# Patient Record
Sex: FEMALE | Race: WHITE | NOT HISPANIC OR LATINO | Employment: PART TIME | URBAN - METROPOLITAN AREA
[De-identification: names, ages, dates, MRNs, and addresses within clinical notes are randomized per-mention and may not be internally consistent; named-entity substitution may affect disease eponyms.]

---

## 2017-05-17 ENCOUNTER — ALLSCRIPTS OFFICE VISIT (OUTPATIENT)
Dept: OTHER | Facility: OTHER | Age: 67
End: 2017-05-17

## 2017-05-17 DIAGNOSIS — Z13.83 ENCOUNTER FOR SCREENING FOR RESPIRATORY DISORDER: ICD-10-CM

## 2017-05-17 DIAGNOSIS — R93.89 ABNORMAL FINDINGS ON DIAGNOSTIC IMAGING OF OTHER SPECIFIED BODY STRUCTURES: ICD-10-CM

## 2017-05-17 DIAGNOSIS — Z13.89 ENCOUNTER FOR SCREENING FOR OTHER DISORDER: ICD-10-CM

## 2017-05-17 DIAGNOSIS — Z13.6 ENCOUNTER FOR SCREENING FOR CARDIOVASCULAR DISORDERS: ICD-10-CM

## 2017-05-17 DIAGNOSIS — E03.9 HYPOTHYROIDISM: ICD-10-CM

## 2017-07-23 ENCOUNTER — HOSPITAL ENCOUNTER (OUTPATIENT)
Dept: CT IMAGING | Facility: HOSPITAL | Age: 67
Discharge: HOME | End: 2017-07-23
Payer: MEDICARE

## 2017-07-23 DIAGNOSIS — R93.89 ABNORMAL FINDINGS ON DIAGNOSTIC IMAGING OF OTHER SPECIFIED BODY STRUCTURES: ICD-10-CM

## 2017-07-23 PROCEDURE — 71250 CT THORAX DX C-: CPT

## 2017-07-26 ENCOUNTER — GENERIC CONVERSION - ENCOUNTER (OUTPATIENT)
Dept: OTHER | Facility: OTHER | Age: 67
End: 2017-07-26

## 2017-08-24 ENCOUNTER — ALLSCRIPTS OFFICE VISIT (OUTPATIENT)
Dept: OTHER | Facility: OTHER | Age: 67
End: 2017-08-24

## 2017-08-29 ENCOUNTER — APPOINTMENT (OUTPATIENT)
Dept: LAB | Facility: CLINIC | Age: 67
End: 2017-08-29
Payer: MEDICARE

## 2017-08-29 ENCOUNTER — APPOINTMENT (EMERGENCY)
Dept: CT IMAGING | Facility: HOSPITAL | Age: 67
End: 2017-08-29
Payer: MEDICARE

## 2017-08-29 ENCOUNTER — HOSPITAL ENCOUNTER (OUTPATIENT)
Facility: HOSPITAL | Age: 67
Setting detail: OBSERVATION
Discharge: HOME | End: 2017-08-30
Attending: EMERGENCY MEDICINE | Admitting: INTERNAL MEDICINE
Payer: MEDICARE

## 2017-08-29 DIAGNOSIS — I16.0 HYPERTENSIVE URGENCY: ICD-10-CM

## 2017-08-29 DIAGNOSIS — R93.89 ABNORMAL FINDINGS ON DIAGNOSTIC IMAGING OF OTHER SPECIFIED BODY STRUCTURES: ICD-10-CM

## 2017-08-29 DIAGNOSIS — Z13.89 ENCOUNTER FOR SCREENING FOR OTHER DISORDER: ICD-10-CM

## 2017-08-29 DIAGNOSIS — R51.9 HEADACHE: Primary | ICD-10-CM

## 2017-08-29 DIAGNOSIS — Z13.83 ENCOUNTER FOR SCREENING FOR RESPIRATORY DISORDER: ICD-10-CM

## 2017-08-29 DIAGNOSIS — G43.009 ATYPICAL MIGRAINE: Chronic | ICD-10-CM

## 2017-08-29 DIAGNOSIS — E03.9 HYPOTHYROIDISM: ICD-10-CM

## 2017-08-29 DIAGNOSIS — R20.2 PARESTHESIA OF LEFT ARM: ICD-10-CM

## 2017-08-29 DIAGNOSIS — Z13.6 ENCOUNTER FOR SCREENING FOR CARDIOVASCULAR DISORDERS: ICD-10-CM

## 2017-08-29 DIAGNOSIS — R20.0 LEFT SIDED NUMBNESS: ICD-10-CM

## 2017-08-29 PROBLEM — R03.0 ELEVATED BLOOD PRESSURE READING WITHOUT DIAGNOSIS OF HYPERTENSION: Status: ACTIVE | Noted: 2017-08-29

## 2017-08-29 LAB
ALBUMIN SERPL BCP-MCNC: 3.8 G/DL (ref 3.5–5)
ALP SERPL-CCNC: 72 U/L (ref 46–116)
ALT SERPL W P-5'-P-CCNC: 28 U/L (ref 12–78)
ANION GAP SERPL CALCULATED.3IONS-SCNC: 8 MMOL/L (ref 4–13)
ANION GAP SERPL CALCULATED.3IONS-SCNC: 8 MMOL/L (ref 4–13)
AST SERPL W P-5'-P-CCNC: 20 U/L (ref 5–45)
BASOPHILS # BLD AUTO: 0.03 THOUSANDS/ΜL (ref 0–0.1)
BASOPHILS # BLD AUTO: 0.04 THOUSANDS/ΜL (ref 0–0.1)
BASOPHILS NFR BLD AUTO: 0 % (ref 0–1)
BASOPHILS NFR BLD AUTO: 0 % (ref 0–1)
BILIRUB SERPL-MCNC: 0.4 MG/DL (ref 0.2–1)
BUN SERPL-MCNC: 11 MG/DL (ref 5–25)
BUN SERPL-MCNC: 14 MG/DL (ref 5–25)
CALCIUM SERPL-MCNC: 9 MG/DL (ref 8.3–10.1)
CALCIUM SERPL-MCNC: 9.2 MG/DL (ref 8.3–10.1)
CHLORIDE SERPL-SCNC: 101 MMOL/L (ref 100–108)
CHLORIDE SERPL-SCNC: 103 MMOL/L (ref 100–108)
CHOLEST SERPL-MCNC: 175 MG/DL (ref 50–200)
CO2 SERPL-SCNC: 27 MMOL/L (ref 21–32)
CO2 SERPL-SCNC: 28 MMOL/L (ref 21–32)
CREAT SERPL-MCNC: 0.84 MG/DL (ref 0.6–1.3)
CREAT SERPL-MCNC: 0.88 MG/DL (ref 0.6–1.3)
EOSINOPHIL # BLD AUTO: 0.01 THOUSAND/ΜL (ref 0–0.61)
EOSINOPHIL # BLD AUTO: 0.11 THOUSAND/ΜL (ref 0–0.61)
EOSINOPHIL NFR BLD AUTO: 0 % (ref 0–6)
EOSINOPHIL NFR BLD AUTO: 1 % (ref 0–6)
ERYTHROCYTE [DISTWIDTH] IN BLOOD BY AUTOMATED COUNT: 13.3 % (ref 11.6–15.1)
ERYTHROCYTE [DISTWIDTH] IN BLOOD BY AUTOMATED COUNT: 13.5 % (ref 11.6–15.1)
GFR SERPL CREATININE-BSD FRML MDRD: 69 ML/MIN/1.73SQ M
GFR SERPL CREATININE-BSD FRML MDRD: 73 ML/MIN/1.73SQ M
GLUCOSE P FAST SERPL-MCNC: 79 MG/DL (ref 65–99)
GLUCOSE SERPL-MCNC: 163 MG/DL (ref 65–140)
HCT VFR BLD AUTO: 48.7 % (ref 34.8–46.1)
HCT VFR BLD AUTO: 49.3 % (ref 34.8–46.1)
HDLC SERPL-MCNC: 42 MG/DL (ref 40–60)
HGB BLD-MCNC: 16.5 G/DL (ref 11.5–15.4)
HGB BLD-MCNC: 16.6 G/DL (ref 11.5–15.4)
LDLC SERPL CALC-MCNC: 102 MG/DL (ref 0–100)
LYMPHOCYTES # BLD AUTO: 2.19 THOUSANDS/ΜL (ref 0.6–4.47)
LYMPHOCYTES # BLD AUTO: 6.11 THOUSANDS/ΜL (ref 0.6–4.47)
LYMPHOCYTES NFR BLD AUTO: 18 % (ref 14–44)
LYMPHOCYTES NFR BLD AUTO: 39 % (ref 14–44)
MCH RBC QN AUTO: 32.9 PG (ref 26.8–34.3)
MCH RBC QN AUTO: 33.5 PG (ref 26.8–34.3)
MCHC RBC AUTO-ENTMCNC: 33.5 G/DL (ref 31.4–37.4)
MCHC RBC AUTO-ENTMCNC: 34.1 G/DL (ref 31.4–37.4)
MCV RBC AUTO: 100 FL (ref 82–98)
MCV RBC AUTO: 96 FL (ref 82–98)
MONOCYTES # BLD AUTO: 0.87 THOUSAND/ΜL (ref 0.17–1.22)
MONOCYTES # BLD AUTO: 1.75 THOUSAND/ΜL (ref 0.17–1.22)
MONOCYTES NFR BLD AUTO: 11 % (ref 4–12)
MONOCYTES NFR BLD AUTO: 7 % (ref 4–12)
NEUTROPHILS # BLD AUTO: 7.5 THOUSANDS/ΜL (ref 1.85–7.62)
NEUTROPHILS # BLD AUTO: 9.25 THOUSANDS/ΜL (ref 1.85–7.62)
NEUTS SEG NFR BLD AUTO: 49 % (ref 43–75)
NEUTS SEG NFR BLD AUTO: 75 % (ref 43–75)
NRBC BLD AUTO-RTO: 0 /100 WBCS
PLATELET # BLD AUTO: 243 THOUSANDS/UL (ref 149–390)
PLATELET # BLD AUTO: 247 THOUSANDS/UL (ref 149–390)
PMV BLD AUTO: 11.5 FL (ref 8.9–12.7)
PMV BLD AUTO: 12.7 FL (ref 8.9–12.7)
POTASSIUM SERPL-SCNC: 3.8 MMOL/L (ref 3.5–5.3)
POTASSIUM SERPL-SCNC: 4.8 MMOL/L (ref 3.5–5.3)
PROT SERPL-MCNC: 7.5 G/DL (ref 6.4–8.2)
RBC # BLD AUTO: 4.93 MILLION/UL (ref 3.81–5.12)
RBC # BLD AUTO: 5.05 MILLION/UL (ref 3.81–5.12)
SODIUM SERPL-SCNC: 136 MMOL/L (ref 136–145)
SODIUM SERPL-SCNC: 139 MMOL/L (ref 136–145)
TRIGL SERPL-MCNC: 155 MG/DL
TROPONIN I SERPL-MCNC: <0.02 NG/ML
TSH SERPL DL<=0.05 MIU/L-ACNC: 1.3 UIU/ML (ref 0.36–3.74)
WBC # BLD AUTO: 12.35 THOUSAND/UL (ref 4.31–10.16)
WBC # BLD AUTO: 15.59 THOUSAND/UL (ref 4.31–10.16)

## 2017-08-29 PROCEDURE — 70450 CT HEAD/BRAIN W/O DYE: CPT

## 2017-08-29 PROCEDURE — 36415 COLL VENOUS BLD VENIPUNCTURE: CPT

## 2017-08-29 PROCEDURE — 99285 EMERGENCY DEPT VISIT HI MDM: CPT

## 2017-08-29 PROCEDURE — 85025 COMPLETE CBC W/AUTO DIFF WBC: CPT

## 2017-08-29 PROCEDURE — 80053 COMPREHEN METABOLIC PANEL: CPT

## 2017-08-29 PROCEDURE — 80061 LIPID PANEL: CPT

## 2017-08-29 PROCEDURE — 80048 BASIC METABOLIC PNL TOTAL CA: CPT | Performed by: EMERGENCY MEDICINE

## 2017-08-29 PROCEDURE — 84484 ASSAY OF TROPONIN QUANT: CPT | Performed by: EMERGENCY MEDICINE

## 2017-08-29 PROCEDURE — 85025 COMPLETE CBC W/AUTO DIFF WBC: CPT | Performed by: EMERGENCY MEDICINE

## 2017-08-29 PROCEDURE — 84443 ASSAY THYROID STIM HORMONE: CPT

## 2017-08-29 PROCEDURE — 93005 ELECTROCARDIOGRAM TRACING: CPT | Performed by: EMERGENCY MEDICINE

## 2017-08-29 RX ORDER — ATORVASTATIN CALCIUM 40 MG/1
40 TABLET, FILM COATED ORAL EVERY EVENING
Status: DISCONTINUED | OUTPATIENT
Start: 2017-08-29 | End: 2017-08-30 | Stop reason: HOSPADM

## 2017-08-29 RX ORDER — KETOROLAC TROMETHAMINE 30 MG/ML
15 INJECTION, SOLUTION INTRAMUSCULAR; INTRAVENOUS EVERY 6 HOURS SCHEDULED
Status: DISCONTINUED | OUTPATIENT
Start: 2017-08-30 | End: 2017-08-30

## 2017-08-29 RX ORDER — METOCLOPRAMIDE HYDROCHLORIDE 5 MG/ML
10 INJECTION INTRAMUSCULAR; INTRAVENOUS EVERY 6 HOURS SCHEDULED
Status: DISCONTINUED | OUTPATIENT
Start: 2017-08-30 | End: 2017-08-29

## 2017-08-29 RX ORDER — LABETALOL HYDROCHLORIDE 5 MG/ML
10 INJECTION, SOLUTION INTRAVENOUS ONCE
Status: COMPLETED | OUTPATIENT
Start: 2017-08-29 | End: 2017-08-29

## 2017-08-29 RX ORDER — METOCLOPRAMIDE HYDROCHLORIDE 5 MG/ML
10 INJECTION INTRAMUSCULAR; INTRAVENOUS EVERY 6 HOURS SCHEDULED
Status: DISCONTINUED | OUTPATIENT
Start: 2017-08-30 | End: 2017-08-30

## 2017-08-29 RX ORDER — MAGNESIUM SULFATE HEPTAHYDRATE 40 MG/ML
2 INJECTION, SOLUTION INTRAVENOUS ONCE
Status: COMPLETED | OUTPATIENT
Start: 2017-08-29 | End: 2017-08-30

## 2017-08-29 RX ORDER — DIPHENHYDRAMINE HYDROCHLORIDE 50 MG/ML
25 INJECTION INTRAMUSCULAR; INTRAVENOUS ONCE
Status: COMPLETED | OUTPATIENT
Start: 2017-08-29 | End: 2017-08-29

## 2017-08-29 RX ORDER — NICOTINE 21 MG/24HR
1 PATCH, TRANSDERMAL 24 HOURS TRANSDERMAL DAILY
Status: DISCONTINUED | OUTPATIENT
Start: 2017-08-30 | End: 2017-08-30 | Stop reason: HOSPADM

## 2017-08-29 RX ORDER — DIPHENHYDRAMINE HYDROCHLORIDE 50 MG/ML
12.5 INJECTION INTRAMUSCULAR; INTRAVENOUS EVERY 6 HOURS
Status: DISCONTINUED | OUTPATIENT
Start: 2017-08-29 | End: 2017-08-29

## 2017-08-29 RX ORDER — LEVOTHYROXINE SODIUM 0.12 MG/1
125 TABLET ORAL
Status: DISCONTINUED | OUTPATIENT
Start: 2017-08-30 | End: 2017-08-30 | Stop reason: HOSPADM

## 2017-08-29 RX ORDER — DIPHENHYDRAMINE HYDROCHLORIDE 50 MG/ML
12.5 INJECTION INTRAMUSCULAR; INTRAVENOUS EVERY 6 HOURS
Status: DISCONTINUED | OUTPATIENT
Start: 2017-08-30 | End: 2017-08-30

## 2017-08-29 RX ORDER — ASPIRIN 325 MG
325 TABLET ORAL DAILY
Status: DISCONTINUED | OUTPATIENT
Start: 2017-08-30 | End: 2017-08-30 | Stop reason: HOSPADM

## 2017-08-29 RX ORDER — LEVOTHYROXINE SODIUM 0.12 MG/1
125 TABLET ORAL DAILY
COMMUNITY
End: 2018-11-27 | Stop reason: SDUPTHER

## 2017-08-29 RX ADMIN — MAGNESIUM SULFATE IN WATER 2 G: 40 INJECTION, SOLUTION INTRAVENOUS at 22:30

## 2017-08-29 RX ADMIN — LABETALOL HYDROCHLORIDE 10 MG: 5 INJECTION, SOLUTION INTRAVENOUS at 17:53

## 2017-08-29 RX ADMIN — ATORVASTATIN CALCIUM 40 MG: 40 TABLET, FILM COATED ORAL at 22:29

## 2017-08-29 RX ADMIN — HYDROMORPHONE HYDROCHLORIDE 0.5 MG: 1 INJECTION, SOLUTION INTRAMUSCULAR; INTRAVENOUS; SUBCUTANEOUS at 17:59

## 2017-08-29 RX ADMIN — DIPHENHYDRAMINE HYDROCHLORIDE 25 MG: 50 INJECTION, SOLUTION INTRAMUSCULAR; INTRAVENOUS at 18:05

## 2017-08-30 ENCOUNTER — APPOINTMENT (OUTPATIENT)
Dept: MRI IMAGING | Facility: HOSPITAL | Age: 67
End: 2017-08-30
Payer: MEDICARE

## 2017-08-30 VITALS
HEART RATE: 68 BPM | HEIGHT: 64 IN | SYSTOLIC BLOOD PRESSURE: 128 MMHG | OXYGEN SATURATION: 94 % | WEIGHT: 190.92 LBS | DIASTOLIC BLOOD PRESSURE: 58 MMHG | BODY MASS INDEX: 32.59 KG/M2 | RESPIRATION RATE: 20 BRPM | TEMPERATURE: 97.9 F

## 2017-08-30 LAB
ANION GAP SERPL CALCULATED.3IONS-SCNC: 9 MMOL/L (ref 4–13)
ATRIAL RATE: 97 BPM
BUN SERPL-MCNC: 13 MG/DL (ref 5–25)
CALCIUM SERPL-MCNC: 8.6 MG/DL (ref 8.3–10.1)
CHLORIDE SERPL-SCNC: 102 MMOL/L (ref 100–108)
CHOLEST SERPL-MCNC: 147 MG/DL (ref 50–200)
CO2 SERPL-SCNC: 24 MMOL/L (ref 21–32)
CREAT SERPL-MCNC: 0.84 MG/DL (ref 0.6–1.3)
ERYTHROCYTE [DISTWIDTH] IN BLOOD BY AUTOMATED COUNT: 13.5 % (ref 11.6–15.1)
EST. AVERAGE GLUCOSE BLD GHB EST-MCNC: 108 MG/DL
GFR SERPL CREATININE-BSD FRML MDRD: 73 ML/MIN/1.73SQ M
GLUCOSE P FAST SERPL-MCNC: 85 MG/DL (ref 65–99)
GLUCOSE SERPL-MCNC: 85 MG/DL (ref 65–140)
HBA1C MFR BLD: 5.4 % (ref 4.2–6.3)
HCT VFR BLD AUTO: 45.6 % (ref 34.8–46.1)
HDLC SERPL-MCNC: 34 MG/DL (ref 40–60)
HGB BLD-MCNC: 15.2 G/DL (ref 11.5–15.4)
LDLC SERPL CALC-MCNC: 79 MG/DL (ref 0–100)
MCH RBC QN AUTO: 32.8 PG (ref 26.8–34.3)
MCHC RBC AUTO-ENTMCNC: 33.3 G/DL (ref 31.4–37.4)
MCV RBC AUTO: 98 FL (ref 82–98)
P AXIS: 45 DEGREES
PLATELET # BLD AUTO: 217 THOUSANDS/UL (ref 149–390)
PMV BLD AUTO: 11.7 FL (ref 8.9–12.7)
POTASSIUM SERPL-SCNC: 3.7 MMOL/L (ref 3.5–5.3)
PR INTERVAL: 112 MS
QRS AXIS: 41 DEGREES
QRSD INTERVAL: 116 MS
QT INTERVAL: 362 MS
QTC INTERVAL: 445 MS
RBC # BLD AUTO: 4.64 MILLION/UL (ref 3.81–5.12)
SODIUM SERPL-SCNC: 135 MMOL/L (ref 136–145)
T WAVE AXIS: 53 DEGREES
TRIGL SERPL-MCNC: 169 MG/DL
VENTRICULAR RATE: 91 BPM
WBC # BLD AUTO: 13.32 THOUSAND/UL (ref 4.31–10.16)

## 2017-08-30 PROCEDURE — 80048 BASIC METABOLIC PNL TOTAL CA: CPT | Performed by: PHYSICIAN ASSISTANT

## 2017-08-30 PROCEDURE — 70551 MRI BRAIN STEM W/O DYE: CPT

## 2017-08-30 PROCEDURE — 70544 MR ANGIOGRAPHY HEAD W/O DYE: CPT

## 2017-08-30 PROCEDURE — 80061 LIPID PANEL: CPT | Performed by: PHYSICIAN ASSISTANT

## 2017-08-30 PROCEDURE — 83036 HEMOGLOBIN GLYCOSYLATED A1C: CPT | Performed by: PHYSICIAN ASSISTANT

## 2017-08-30 PROCEDURE — 85027 COMPLETE CBC AUTOMATED: CPT | Performed by: PHYSICIAN ASSISTANT

## 2017-08-30 RX ORDER — LORAZEPAM 2 MG/ML
0.5 INJECTION INTRAMUSCULAR ONCE
Status: COMPLETED | OUTPATIENT
Start: 2017-08-30 | End: 2017-08-30

## 2017-08-30 RX ORDER — ASPIRIN 81 MG/1
81 TABLET, CHEWABLE ORAL DAILY
Refills: 0
Start: 2017-08-30 | End: 2020-12-16 | Stop reason: ALTCHOICE

## 2017-08-30 RX ORDER — NICOTINE 21 MG/24HR
1 PATCH, TRANSDERMAL 24 HOURS TRANSDERMAL DAILY
Qty: 28 PATCH | Refills: 0 | Status: SHIPPED | OUTPATIENT
Start: 2017-08-30 | End: 2018-04-24 | Stop reason: ALTCHOICE

## 2017-08-30 RX ADMIN — ASPIRIN 325 MG: 325 TABLET ORAL at 08:45

## 2017-08-30 RX ADMIN — METOCLOPRAMIDE 10 MG: 5 INJECTION, SOLUTION INTRAMUSCULAR; INTRAVENOUS at 06:27

## 2017-08-30 RX ADMIN — DIPHENHYDRAMINE HYDROCHLORIDE 12.5 MG: 50 INJECTION, SOLUTION INTRAMUSCULAR; INTRAVENOUS at 00:41

## 2017-08-30 RX ADMIN — LEVOTHYROXINE SODIUM 125 MCG: 125 TABLET ORAL at 06:27

## 2017-08-30 RX ADMIN — DIPHENHYDRAMINE HYDROCHLORIDE 12.5 MG: 50 INJECTION, SOLUTION INTRAMUSCULAR; INTRAVENOUS at 06:27

## 2017-08-30 RX ADMIN — METOCLOPRAMIDE 10 MG: 5 INJECTION, SOLUTION INTRAMUSCULAR; INTRAVENOUS at 00:41

## 2017-08-30 RX ADMIN — LORAZEPAM 0.5 MG: 2 INJECTION INTRAMUSCULAR; INTRAVENOUS at 12:03

## 2017-08-30 RX ADMIN — KETOROLAC TROMETHAMINE 15 MG: 30 INJECTION, SOLUTION INTRAMUSCULAR at 06:27

## 2017-08-30 RX ADMIN — KETOROLAC TROMETHAMINE 15 MG: 30 INJECTION, SOLUTION INTRAMUSCULAR at 00:41

## 2017-09-05 ENCOUNTER — GENERIC CONVERSION - ENCOUNTER (OUTPATIENT)
Dept: OTHER | Facility: OTHER | Age: 67
End: 2017-09-05

## 2017-09-05 ENCOUNTER — GENERIC CONVERSION - ENCOUNTER (OUTPATIENT)
Dept: FAMILY MEDICINE CLINIC | Facility: CLINIC | Age: 67
End: 2017-09-05

## 2017-09-12 ENCOUNTER — ALLSCRIPTS OFFICE VISIT (OUTPATIENT)
Dept: OTHER | Facility: OTHER | Age: 67
End: 2017-09-12

## 2017-09-28 ENCOUNTER — GENERIC CONVERSION - ENCOUNTER (OUTPATIENT)
Dept: OTHER | Facility: OTHER | Age: 67
End: 2017-09-28

## 2017-10-04 ENCOUNTER — ALLSCRIPTS OFFICE VISIT (OUTPATIENT)
Dept: OTHER | Facility: OTHER | Age: 67
End: 2017-10-04

## 2017-10-24 NOTE — PROGRESS NOTES
Assessment  1  Cervicalgia (723 1) (M54 2)   2  Headache (784 0) (R51)   3  Acute epigastric pain (789 06,338 19) (R10 13)    Plan  Cervicalgia    · PredniSONE 20 MG Oral Tablet; 1 tab bid x 4d then 1 qd x 4d then stop    Discussion/Summary    #1 headachecervicalgiaepigastric painI reviewed with patient  Headache seems to be related to irritation of the posterior recommend nerve and possibly posterior occipital nerve  Posterior movement of the neck with rotation to the affected side does seem to make pain worse  No evidence of tenderness over the temporal artery  Epigastric discomfort most likely related to nonsteroidal use on an empty stomach  Trial of prednisone as above  Nexium 40 mg by mouth daily  Patient is to take medication with food  Recheck Monday if not improvedhealth maintenance - patient is overdue for lab work  I reviewed with patient  Patient will follow-up as above or at her next scheduled visit in November  Patient to call for problems or concerns in the interim  The patient was counseled regarding instructions for management,-- risk factor reductions,-- prognosis,-- patient and family education,-- impressions,-- risks and benefits of treatment options,-- importance of compliance with treatment  Possible side effects of new medications were reviewed with the patient/guardian today  The treatment plan was reviewed with the patient/guardian  The patient/guardian understands and agrees with the treatment plan      Chief Complaint  1  Headache  c/o frequent H A      History of Present Illness  HPI: as abovept reports L temple pain (sharp) x 4 days  No change with ibuprofen  Pt does have some L posterior auricular pain off and on  No fever/chills  Pt has some epigastric pain related to taking too much advil  No change in BMs (melena, hematochezia)  No asymmetric weakness or numbness  Patient also denies any vertigo, change in hearing, change in vision or other problems        Review of Systems    Constitutional: as noted in HPI    ENT: as noted in HPI  Cardiovascular: no complaints of slow or fast heart rate, no chest pain, no palpitations, no leg claudication or lower extremity edema  Gastrointestinal: as noted in HPI  Genitourinary: no complaints of dysuria, no incontinence, no pelvic pain, no dysmenorrhea, no vaginal discharge or abnormal vaginal bleeding  Musculoskeletal: as noted in HPI  Integumentary: no complaints of skin rash or lesion, no itching or dry skin, no skin wounds  Neurological: as noted in HPI  Active Problems  1  Abnormal chest CT (793 2) (R93 8)   2  Depression screening (V79 0) (Z13 89)   3  Dyspnea (786 09) (R06 00)   4  Elevated blood pressure   5  Encounter for screening mammogram for breast cancer (V76 12) (Z12 31)   6  Headache (784 0) (R51)   7  Hypertension (401 9) (I10)   8  Hypothyroidism (244 9) (E03 9)   9  Lightheadedness (780 4) (R42)   10  Nausea (787 02) (R11 0)   11  Need for influenza vaccination (V04 81) (Z23)   12  Need for pneumococcal vaccination (V03 82) (Z23)   13  Screening for cardiovascular, respiratory, and genitourinary diseases    (V81 2,V81 4,V81 6) (Z13 6,Z13 83,Z13 89)   14  Screening for cervical cancer (V76 2) (Z12 4)   15  Screening for colorectal cancer (V76 51) (Z12 11,Z12 12)   16  Screening for genitourinary condition (V81 6) (Z13 89)   17  Screening for neurological condition (V80 09) (Z13 89)   18  Tick bite (919 4,E906 4) [facility])    Past Medical History  1  Sinusitis (473 9) (J32 9)  Active Problems And Past Medical History Reviewed: The active problems and past medical history were reviewed and updated today  Family History  Mother    1  Family history of cardiac disorder (V17 49) (Z82 49)   2  Family history of diabetes mellitus (V18 0) (Z83 3)   3  Family history of Hypertension (V17 49)  Sister    4  Family history of Breast Cancer (V16 3)  Brother    5  Family history of Colon Cancer (V16 0)   6  Family history of cardiac disorder (V17 49) (Z82 49)   7  Family history of diabetes mellitus (V18 0) (Z83 3)   8  Family history of Heart Disease (V17 49)   9  Family history of Hypertension (V17 49)    Social History   · Denied: History of Alcohol Use (History)   · Always uses seat belt   · Currently smokes tobacco (305 1) (F17 200)   · Daily Coffee Consumption (___ Cups/Day)   · Denied: History of Daily Cola Consumption (___ Cans/Day)   · Denied: History of Daily Tea Consumption (___ Cups/Day)   · Exercise: Walking   · Has 4 children   · High school graduate   · Marital History - Single   · No drug use   · No guns in the home   · No living will   · No Christianity beliefs   · 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, adequate (per day)  The social history was reviewed and updated today  Surgical History  1  History of Foot Surgery   2  History of Gallbladder Surgery    Current Meds   1  Levothyroxine Sodium 125 MCG Oral Tablet; TAKE ONE TABLET BY MOUTH ONCE   DAILY AS DIRECTED; Therapy: 25Aug2011 to (Evaluate:24Nov2017)  Requested for: 92AAX3926; Last   Rx:29Nov2016 Ordered    The medication list was reviewed and updated today  Allergies  1  Naprosyn SUSP   2  Penicillin V Potassium TABS   3  Vicodin TABS   4  Penicillins  5  Bee sting    Vitals   Recorded: 24Aug2017 04:43PM   Temperature 98 8 F   Heart Rate 84   Systolic 717   Diastolic 068   Height 5 ft 3 25 in   Weight 191 lb 8 oz   BMI Calculated 33 65   BSA Calculated 1 9     Physical Exam    Constitutional   General appearance: Abnormal     Head and Face   Head and face: Normal  -- Temporalis muscle/temporal arteries are nontender to palpation  Palpation of the face and sinuses: No sinus tenderness  Eyes   Conjunctiva and lids: No swelling, erythema or discharge  Pupils and irises: Equal, round, reactive to light  Ophthalmoscopic examination: Normal fundi and optic discs  -- Fundi difficult to appreciated though I do not see evidence of papilledema  Ears, Nose, Mouth, and Throat   External inspection of ears and nose: Normal     Otoscopic examination: Tympanic membranes translucent with normal light reflex  Canals patent without erythema  Nasal mucosa, septum, and turbinates: Normal without edema or erythema  Lips, teeth, and gums: Normal, good dentition  Oropharynx: Normal with no erythema, edema, exudate or lesions  Neck   Neck: Abnormal  -- (Tenderness palpation over the cervical occipital junction at the origin of the posterior auricular nerve  Increased discomfort noted with posterior flexion as well as rotation to the affected side)   Pulmonary   Respiratory effort: No increased work of breathing or signs of respiratory distress  Auscultation of lungs: Clear to auscultation  Cardiovascular   Auscultation of heart: Normal rate and rhythm, normal S1 and S2, no murmurs  Abdominal aorta: Normal     Abdomen   Abdomen: Abnormal  -- Will discomfort to palpation at the epigastric area  No guarding or rebound  No masses appreciated  Liver and spleen: No hepatomegaly or splenomegaly  Lymphatic   Palpation of lymph nodes in neck: No lymphadenopathy  Palpation of lymph nodes in other areas: No lymphadenopathy  Musculoskeletal   Joints, bones, and muscles: Abnormal  -- Neck as above  Muscle strength/tone: Normal  -- Intact in the arms bilaterally  Neurologic   Reflexes: 2+ and symmetric  Sensation: No sensory loss  Future Appointments    Date/Time Provider Specialty Site   11/16/2017 08:00 AM CARA Nice   [address] [employer]     Signatures   Electronically signed by : CARA Stone ; Aug 27 2017  6:29PM EST                       (Author)

## 2017-10-27 NOTE — PROGRESS NOTES
Assessment  1  Headache (784 0) (R51)   2  Chronic migraine (346 70) (G43 709)    Plan  Chronic migraine    · Isometheptene-Dichloral-APAP -325 MG Oral Capsule; TAKE 2 CAPSULES  AT ONSET OF HEADACHE THEN 1 CAPSULE EVERY HOUR UP  TO 6 IN 24 HOURS  Max 3 days a week   Rx By: Sanjuanita Arenas; Dispense: 0 Days ; #:30 Capsule; Refill: 1;For: Chronic migraine; YANCY = N; Print Rx   · Follow-up visit in 3 months Evaluation and Treatment  Follow-up  Status: Complete   Done: 80UDA1100   Ordered; For: Chronic migraine; Ordered By: Sanjuanita Arenas Performed:  Due: 50UGI2000; Last Updated By: Warner Morris; 10/4/2017 9:50:46 AM    Discussion/Summary  Discussion Summary:   Ms Yola Caballero is a pleasant 79 yo female seen in consultation for chronic migraine with aura with history of brief LOC of emesis with severe migraines since the age of 16  Migraine frequency: 2/3 days a week- > 4 hours a day  Abortive treatment : Midrin will avoid NSAIDs due to reflux and gastritis from recent history of overusing ibuprofen  Preventative treatment: B2 and Magnesium daily  She is more interested in herbal supplements  MRI brain and MRA not acute  MRV with dominant right venous system with no clear flow restriction  Her neurologic exam is non focal todayFollow up in three months time  notifying us if any change in the nature of her headaches, and discussed if sudden onset of focal deficits, to call 911/go to ER  Medication SE Review and Pt Understands Tx: Possible side effects of new medications were reviewed with the patient/guardian today  Headache [facility]:   The patient was counseled regarding;   Patient education was completed today and we also discussed precautions for rebound headaches  --    When patient has a moderate to severe headache, they should seek rest, initiate relaxation and apply cold compresses to the head  Also recommended to the patient :  1  Maintain regular sleep schedule -- 2   Limit over the counter medications  (No more than 3 times a week)  -- 3  Maintain headache diary  -- 4  Limit caffeine to 1-2 cups a day or less  -- 5  Avoid dietary trigger  (list given to the patient and reviewed with them)  -- 6  Patient is to have regular frequent meals to prevent headache onset  Chief Complaint  Chief Complaint Free Text Note Form: Patient presents for a hospital follow-up for atypical migraine  History of Present Illness  HPI: Ms Roosevelt Kramer is a pleasant 79 yo female seen in follow up from recent hospitalization for one week long migraine August 29th me they started at the age of 16 with brief LOC, with her typical migraines  me she has photophobia phonophobia, nausea and vomiting  Tells me she has aura of tunnel vision and wavy lines in her vision  States if she takes ibuprofen 8 or more it will make her migraine less severe, and states if she lays down in a dark room and rests her migraine resolves  States typically six hours  Tells me if her migraines are severe and she has emesis, she briefly loses consciousness and then comes back to with no significant residual deficits  me she has not tried any other medication for migraine  her mom told her that from the age of 17 months old to 9 years, she had stiffness of all her extremities and would lose consciousness, when tired- states would get a shot for this but does not know what this was  States this has not occurred since  me she had her typical migraine a week prior to August 29th with new features of left shoulder and arm numbness and tingling, states came to ED per her PCP's advice-- where her blood pressure was 201 SBP, had 10/10 pain, left temporal pain- knife like stabbing pain- she states she took almost a bottle of ibuprofen in three days with no benefit   Tells me cocktail of Toradol, Benadryl and Reglan given in the ED aborted her headache since coming out of the hospital, she has had about 2 migraines a week, tells me she eats something and rests or takes 1 Tylenol and migraines are improved with this  She states has not used ibuprofen since, as she is having severe gastritis with this  denies any new features to her migraines  tells me she bought the Vitamin B2 and magnesium recommended in the hospital for prophylaxis but never took it  She tells me she will try it now  I discussed with her that she needs to take this daily, and it might take 4-6 weeks to notice reduction in migraine frequency and or severity  me no recent concussions, but had a concussion with no LOC about 45 years ago  family history of migraines with brief LOC in her son  she has been smoking since the age of 16, and states is thinking of stopping smoking by her son's next birthday  past history of HTN, DM, CAD, bloodclots, strokes, TIAs  style: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, so skips meals, but tells me keeps peanut butter cookies that she tried to eat in between  me she tries to stay hydrated  any food triggers  reviewed her EPIC care everywhere records: MRI brain not acute, MRA not acute, MRV with right venous sinuses more dominant, with definite flow restriction  Neurology HPI Argenis Rowell:   Headache: On a scale of 0-10, the pain severity is a 10  the headaches started at age 16    no accidents or injury prior to the onset of headaches  Headaches are occurring 2-3 times each week-- and-- during various time of the day  headache lasts for 4-6 hour(s)  -- the patient has periods of being headache free  Currently the pain is on the entire left side-- and-- in the temporal region  Warning(s) prior to headache include aura of wavy lines  Usual headache is described as stabbing   Associated symptoms include photophobia,-- phonophobia,-- sensitivity to smell,-- loss of appetite,-- nausea,-- vomiting,-- runny or stuffed up nose,-- with darkness,-- lightheaded or dizzy-- and-- inability to work, but-- no tinnitus,-- no lacrimation,-- no flushing,-- no blurred vision,-- no tingling or numbness of the hands,-- no tingling or numbness of the feet-- and-- no stiff or sore neck  Headaches are made worse by bending over, but-- not while coughing,-- not while sneezing-- and-- not while moving bowel  Headaches are triggered by fatigue,-- stress/tension-- and-- changes in the weather, but-- not eating certain foods,-- not with certain medication,-- not by coughing,-- not while oversleeping-- and-- not when lying down  Review of Systems  Neurological ROS:   Constitutional: no fever, no chills, no recent weight gain, no recent weight loss, no complaints of feeling tired, no changes in appetite  HEENT:  no sinus problems, not feeling congested, no blurred vision, no dryness of the eyes, no eye pain, no hearing loss, no tinnitus, no mouth sores, no sore throat, no hoarseness, no dysphagia, no masses, no bleeding  Cardiovascular:  no chest pain or pressure, no palpitations present, the heart rate was not rapid or irregular, no swelling in the arms or legs, no poor circulation  Respiratory:  no unusual or persistant cough, no shortness of breath with or without exertion  Gastrointestinal:  no nausea, no vomiting, no diarrhea, no abdominal pain, no changes in bowel habits, no melena, no loss of bowel control  Genitourinary:  no incontinence, no feelings of urinary urgency, no increase in frequency, no urinary hesitancy, no dysuria, no hematuria  Musculoskeletal:  no arthralgias, no myalgias, no immobility or loss of function, no head/neck/back pain, no pain while walking  Integumentary  no masses, no rash, no skin lesions, no livedo reticularis  Psychiatric:  no anxiety, no depression, no mood swings, no psychiatric hospitalizations, no sleep problems  Endocrine   no unusual weight loss or gain, no excessive urination, no excessive thirst, no hair loss or gain, no hot or cold intolerance, no menstrual period change or irregularity, no loss of sexual ability or drive, no erection difficulty, no nipple discharge  Hematologic/Lymphatic:  no unusual bleeding, no tendency for easy bruising, no clotting skin or lumps  Neurological General: headache,-- increased sleepiness-- and-- waking up at night  Neurological Mental Status:  no confusion, no mood swings, no alteration or loss of consciousness, no difficulty expressing/understanding speech, no memory problems  Neurological Cranial Nerves:  no blurry or double vision, no loss of vision, no face drooping, no facial numbness or weakness, no taste or smell loss/changes, no hearing loss or ringing, no vertigo or dizziness, no dysphagia, no slurred speech  Neurological Motor findings include: cramping(pre/post exercise)  Neurological Coordination:  no unsteadiness, no vertigo or dizziness, no clumsiness, no problems reaching for objects  Neurological Sensory:  no numbness, no pain, no tingling, does not fall when eyes closed or taking a shower  Neurological Gait:  no difficulty walking, not falling to one side, no sensation of being pushed, has not had falls  ROS Reviewed:   ROS reviewed  Active Problems  1  Abnormal chest CT (793 2) (R93 8)   2  Acute epigastric pain (789 06,338 19) (R10 13)   3  Cervicalgia (723 1) (M54 2)   4  Depression screening (V79 0) (Z13 89)   5  Dyspnea (786 09) (R06 00)   6  Elevated blood pressure   7  Encounter for screening mammogram for breast cancer (V76 12) (Z12 31)   8  Headache (784 0) (R51)   9  Hypertension (401 9) (I10)   10  Hypothyroidism (244 9) (E03 9)   11  Lightheadedness (780 4) (R42)   12  Nausea (787 02) (R11 0)   13  Need for influenza vaccination (V04 81) (Z23)   14  Need for pneumococcal vaccination (V03 82) (Z23)   15  Screening for cardiovascular, respiratory, and genitourinary diseases    (V81 2,V81 4,V81 6) (Z13 6,Z13 83,Z13 89)   16  Screening for cervical cancer (V76 2) (Z12 4)   17  Screening for colorectal cancer (V76 51) (Z12 11,Z12 12)   18   Screening for genitourinary condition (V81 6) (Z13 89)   19  Screening for neurological condition (V80 09) (Z13 89)   20  Tick bite (919 4,E906 4) [facility])    Past Medical History  1  Sinusitis (473 9) (J32 9)  Active Problems And Past Medical History Reviewed: The active problems and past medical history were reviewed and updated today  Surgical History  1  History of Foot Surgery   2  History of Gallbladder Surgery    Family History  Mother    1  Family history of cardiac disorder (V17 49) (Z82 49)   2  Family history of diabetes mellitus (V18 0) (Z83 3)   3  Family history of Hypertension (V17 49)  Sister    4  Family history of Breast Cancer (V16 3)  Brother    5  Family history of Colon Cancer (V16 0)   6  Family history of cardiac disorder (V17 49) (Z82 49)   7  Family history of diabetes mellitus (V18 0) (Z83 3)   8  Family history of Heart Disease (V17 49)   9  Family history of Hypertension (V17 49)  Family History Reviewed: The family history was reviewed and updated today  Social History   · Denied: History of Alcohol Use (History)   · Always uses seat belt   · Currently smokes tobacco (305 1) (F17 200)   · Daily Coffee Consumption (___ Cups/Day)   · Denied: History of Daily Cola Consumption (___ Cans/Day)   · Denied: History of Daily Tea Consumption (___ Cups/Day)   · Exercise: Walking   · Has 4 children   · High school graduate   · Marital History - Single   · No drug use   · No guns in the home   · No living will   · No Shinto beliefs   · 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, adequate (per day)  Social History Reviewed: The social history was reviewed and updated today  Current Meds   1  Aspirin 81 MG CAPS; Therapy: (Recorded:[id number]) to Recorded   2  Levothyroxine Sodium 125 MCG Oral Tablet; TAKE ONE TABLET BY MOUTH ONCE DAILY   AS DIRECTED; Therapy: 27Ezl2298 to (Evaluate:24Nov2017)  Requested for: 68VTJ9277; Last   Rx:29Nov2016 Ordered    Allergies  1  Naprosyn SUSP   2   Penicillin V Potassium TABS   3  Vicodin TABS   4  Penicillins  5  Bee sting    Vitals  Signs   Recorded: 72OHJ0687 08:55AM   Heart Rate: 90  Respiration: 12  Systolic: 804  Diastolic: 80  Height: 5 ft 3 25 in  Weight: 194 lb   BMI Calculated: 34 09  BSA Calculated: 1 91  O2 Saturation: 97    Physical Exam    Constitutional   General Appearance: Appears appropriate for age, healthy, well developed, appropriately groomed and appropriately dressed    Eyes   Ophthalmoscopic examination: Vision is grossly normal  Gross visual field testing by confrontation shows no abnormalities  EOMI in both eyes  Conjunctivae clear  Eyelids normal palpebral fissures equal  Orbits exhibit normal position  No discharge from the eyes  PERRL  Pulmonary   Respiratory effort: Lungs are clear bilaterally  Cardiovascular   Auscultation of heart: Rate is regular  Rhythm is regular  Peripheral vascular exam: Normal pulses throughout, no tenderness, erythema or swelling  Musculoskeletal   Gait and station: Abnormal  -- Abnormal tandem gait  Muscle strength: Normal strength throughout  Muscle tone: No atrophy, abnormal movements, flaccidity, cogwheeling or spasticity  Range of motion: Normal     Stability: Normal     Inspection of temporomandibular joint appears normal     Neurologic   Orientation to person, place, and time: Normal     Attention span and concentration: Normal thought process and attention span  Language: Names objects, able to repeat phrases and speaks spontaneously  Fund of knowledge: Normal vocabulary with appropriate knowledge of current events and past history  Sensation: Intact sensation to pinprick, temperature, vibration, and proprioception in all four extremities  Reflexes: DTR's are normal and symmetric bilaterally  Babinski's reflex is negative bilaterally  No pathologic ankle clonus  Coordination: Cerebellum function intact  No involuntary movement or psychomotor activity     Motor System: No pronator drift  Upper Extremities: Normal to inspection  No tenderness over the upper extremities bilaterally  No instability bilaterally  Strength: Motor strength is 5/5 bilaterally  Normal muscle tone bilaterally  Muscle bulk: Muscle bulk is normal bilaterally  Full ROM bilaterally  Lower Extremities: Normal to inspection and palpation  No tenderness of the lower extremities bilaterally  Exhibits no instability bilaterally  Strength: Motor strength is 5/5 bilaterally  Normal muscle tone bilaterally  Muscle Bulk: Muscle bulk is normal bilaterally  Full ROM bilaterally  Cranial Nerve Exam   II: Normal with no deficit  III,IV, VI: Normal with no deficit  V: Normal with no deficit  VII: Normal with no deficit  VIII: Normal with no deficit  IX: Normal with no deficit  X: Normal with no deficit  XI: Normal with no deficit  XII: Normal with no deficit  Recent and remote memory: Intact  Mood and affect: Normal        Future Appointments    Date/Time Provider Specialty Site   01/24/2018 09:45 AM Kizzy Wells [facility] Neurology [facility] NEUROLOGY [facility]   11/16/2017 08:00 AM CARA Goldsmith   [address] [place] [address]     Signatures   Electronically signed by : Emilie Amaya MD; Oct  4 2017 11:15AM EST                       (Author)

## 2017-12-18 ENCOUNTER — ALLSCRIPTS OFFICE VISIT (OUTPATIENT)
Dept: OTHER | Facility: OTHER | Age: 67
End: 2017-12-18

## 2017-12-18 ENCOUNTER — APPOINTMENT (OUTPATIENT)
Dept: LAB | Facility: CLINIC | Age: 67
End: 2017-12-18
Payer: MEDICARE

## 2017-12-18 DIAGNOSIS — E03.9 HYPOTHYROIDISM: ICD-10-CM

## 2017-12-18 DIAGNOSIS — Z12.11 ENCOUNTER FOR SCREENING FOR MALIGNANT NEOPLASM OF COLON: ICD-10-CM

## 2017-12-18 DIAGNOSIS — Z12.12 ENCOUNTER FOR SCREENING FOR MALIGNANT NEOPLASM OF RECTUM: ICD-10-CM

## 2017-12-18 DIAGNOSIS — D72.829 ELEVATED WHITE BLOOD CELL COUNT: ICD-10-CM

## 2017-12-18 DIAGNOSIS — G43.709 CHRONIC MIGRAINE WITHOUT AURA WITHOUT STATUS MIGRAINOSUS, NOT INTRACTABLE: ICD-10-CM

## 2017-12-18 DIAGNOSIS — I10 ESSENTIAL (PRIMARY) HYPERTENSION: ICD-10-CM

## 2017-12-18 DIAGNOSIS — Z12.31 ENCOUNTER FOR SCREENING MAMMOGRAM FOR MALIGNANT NEOPLASM OF BREAST: ICD-10-CM

## 2017-12-18 LAB
ALBUMIN SERPL BCP-MCNC: 3.9 G/DL (ref 3.5–5)
ALP SERPL-CCNC: 71 U/L (ref 46–116)
ALT SERPL W P-5'-P-CCNC: 33 U/L (ref 12–78)
ANION GAP SERPL CALCULATED.3IONS-SCNC: 5 MMOL/L (ref 4–13)
AST SERPL W P-5'-P-CCNC: 25 U/L (ref 5–45)
BASOPHILS # BLD AUTO: 0.06 THOUSANDS/ΜL (ref 0–0.1)
BASOPHILS NFR BLD AUTO: 1 % (ref 0–1)
BILIRUB SERPL-MCNC: 0.48 MG/DL (ref 0.2–1)
BUN SERPL-MCNC: 15 MG/DL (ref 5–25)
CALCIUM SERPL-MCNC: 9 MG/DL (ref 8.3–10.1)
CHLORIDE SERPL-SCNC: 104 MMOL/L (ref 100–108)
CHOLEST SERPL-MCNC: 194 MG/DL (ref 50–200)
CO2 SERPL-SCNC: 28 MMOL/L (ref 21–32)
CREAT SERPL-MCNC: 0.79 MG/DL (ref 0.6–1.3)
EOSINOPHIL # BLD AUTO: 0.15 THOUSAND/ΜL (ref 0–0.61)
EOSINOPHIL NFR BLD AUTO: 2 % (ref 0–6)
ERYTHROCYTE [DISTWIDTH] IN BLOOD BY AUTOMATED COUNT: 13.8 % (ref 11.6–15.1)
GFR SERPL CREATININE-BSD FRML MDRD: 78 ML/MIN/1.73SQ M
GLUCOSE P FAST SERPL-MCNC: 89 MG/DL (ref 65–99)
HCT VFR BLD AUTO: 47.2 % (ref 34.8–46.1)
HDLC SERPL-MCNC: 39 MG/DL (ref 40–60)
HEMOCCULT STL QL IA: NEGATIVE
HGB BLD-MCNC: 16 G/DL (ref 11.5–15.4)
LDLC SERPL CALC-MCNC: 122 MG/DL (ref 0–100)
LYMPHOCYTES # BLD AUTO: 3.06 THOUSANDS/ΜL (ref 0.6–4.47)
LYMPHOCYTES NFR BLD AUTO: 30 % (ref 14–44)
MCH RBC QN AUTO: 34 PG (ref 26.8–34.3)
MCHC RBC AUTO-ENTMCNC: 33.9 G/DL (ref 31.4–37.4)
MCV RBC AUTO: 100 FL (ref 82–98)
MONOCYTES # BLD AUTO: 1.08 THOUSAND/ΜL (ref 0.17–1.22)
MONOCYTES NFR BLD AUTO: 11 % (ref 4–12)
NEUTROPHILS # BLD AUTO: 5.86 THOUSANDS/ΜL (ref 1.85–7.62)
NEUTS SEG NFR BLD AUTO: 56 % (ref 43–75)
NRBC BLD AUTO-RTO: 0 /100 WBCS
PLATELET # BLD AUTO: 241 THOUSANDS/UL (ref 149–390)
PMV BLD AUTO: 13.4 FL (ref 8.9–12.7)
POTASSIUM SERPL-SCNC: 3.9 MMOL/L (ref 3.5–5.3)
PROT SERPL-MCNC: 7.9 G/DL (ref 6.4–8.2)
RBC # BLD AUTO: 4.7 MILLION/UL (ref 3.81–5.12)
SODIUM SERPL-SCNC: 137 MMOL/L (ref 136–145)
TRIGL SERPL-MCNC: 167 MG/DL
TSH SERPL DL<=0.05 MIU/L-ACNC: 1.21 UIU/ML (ref 0.36–3.74)
WBC # BLD AUTO: 10.26 THOUSAND/UL (ref 4.31–10.16)

## 2017-12-18 PROCEDURE — 84443 ASSAY THYROID STIM HORMONE: CPT

## 2017-12-18 PROCEDURE — G0328 FECAL BLOOD SCRN IMMUNOASSAY: HCPCS

## 2017-12-18 PROCEDURE — 80061 LIPID PANEL: CPT

## 2017-12-18 PROCEDURE — 36415 COLL VENOUS BLD VENIPUNCTURE: CPT

## 2017-12-18 PROCEDURE — 80053 COMPREHEN METABOLIC PANEL: CPT

## 2017-12-18 PROCEDURE — 85025 COMPLETE CBC W/AUTO DIFF WBC: CPT

## 2017-12-19 NOTE — PROGRESS NOTES
Assessment  1  Chronic migraine (346 70) (G43 709)   2  Hypothyroidism (244 9) (E03 9)   3  Leukocytosis (288 60) (D72 829)   4  Hypertension (401 9) (I10)    Plan  Chronic migraine, Hypertension, Hypothyroidism, Leukocytosis    · (1) CBC/PLT/DIFF; Status:Active; Requested for:14Meq1014;    · (1) COMPREHENSIVE METABOLIC PANEL; Status:Active; Requested for:47Gwy1957;    · (1) LIPID PANEL, FASTING; Status:Active; Requested for:81Oxn0490;    · (1) TSH; Status:Active; Requested for:79Kis7902;   Screening for colorectal cancer    · (1) OCCULT BLOOD, FECAL IMMUNOCHEMICAL TEST; Status:Active; Requestedfor:90Omf3678;     Discussion/Summary    #1 hypertension - stable off of meds  Cont to monitor diet and labs  Recheck 6m#2 headache - Persistent  Patient has not been compliant with medications Due to cost  I reviewed stool some cost saving strategies with her including the good Rx kristine for her phone  Patient will continue follow-up with Neurology  #3 hypothyroidism - TSH Has been at goal  Continue to monitor#4 leukocytosis - Seen on last lab work  I repeat a CBC with the above labs  #5 HM- I reviewed health maintenance issues with patient  FIT Test ordered  Check labs as above  Patient up-to-date with flu shot and pneumonia shot  Patient to follow-up as above  Patient to call for problems or concerns in the interim  The patient was counseled regarding diagnostic results,-- instructions for management,-- risk factor reductions,-- prognosis,-- patient and family education,-- impressions,-- risks and benefits of treatment options,-- importance of compliance with treatment  Possible side effects of new medications were reviewed with the patient/guardian today  The treatment plan was reviewed with the patient/guardian  The patient/guardian understands and agrees with the treatment plan      Chief Complaint  6 MONTH WELL CHECK VISIT - HYPOTHYROIDISM   Patient is here today for follow up of chronic conditions described in HPI  History of Present Illness  as above- still getting frequent HA  See by Neuro in Oct but could not afford the med they prescribed  Still uses ibuprofen for rescue  No GI upset  - pt still working  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  - no CP, palp, lightheadedness or other CV symptoms  Still smoking 3-4 cig/day  Pt is trying to wean herself off - no GI or  complaints- due for labs and repeat FIT test  Due for mammo      Review of Systems   Constitutional: as noted in HPI  Eyes: No complaints of eye pain, no red eyes, no eyesight problems, no discharge, no dry eyes, no itching of eyes  ENT: no complaints of earache, no loss of hearing, no nose bleeds, no nasal discharge, no sore throat, no hoarseness  Cardiovascular: No complaints of slow heart rate, no fast heart rate, no chest pain, no palpitations, no leg claudication, no lower extremity edema  Respiratory: No complaints of shortness of breath, no wheezing, no cough, no SOB on exertion, no orthopnea, no PND  Gastrointestinal: No complaints of abdominal pain, no constipation, no nausea or vomiting, no diarrhea, no bloody stools  Genitourinary: No complaints of dysuria, no incontinence, no pelvic pain, no dysmenorrhea, no vaginal discharge or bleeding  Musculoskeletal: No complaints of arthralgias, no myalgias, no joint swelling or stiffness, no limb pain or swelling  Integumentary: No complaints of skin rash or lesions, no itching, no skin wounds, no breast pain or lump  Neurological: as noted in HPI  Psychiatric: Not suicidal, no sleep disturbance, no anxiety or depression, no change in personality, no emotional problems  Endocrine: No complaints of proptosis, no hot flashes, no muscle weakness, no deepening of the voice, no feelings of weakness  Hematologic/Lymphatic: No complaints of swollen glands, no swollen glands in the neck, does not bleed easily, does not bruise easily  Active Problems  1  Abnormal chest CT (793 2) (R93 8)   2  Acute epigastric pain (789 06,338 19) (R10 13)   3  Cervicalgia (723 1) (M54 2)   4  Chronic migraine (346 70) (G43 709)   5  Depression screening (V79 0) (Z13 89)   6  Dyspnea (786 09) (R06 00)   7  Elevated blood pressure   8  Encounter for screening mammogram for breast cancer (V76 12) (Z12 31)   9  Headache (784 0) (R51)   10  Hypertension (401 9) (I10)   11  Hypothyroidism (244 9) (E03 9)   12  Lightheadedness (780 4) (R42)   13  Migraine with aura and without status migrainosus, not intractable (346 00) (G43 109)   14  Nausea (787 02) (R11 0)   15  Need for influenza vaccination (V04 81) (Z23)   16  Need for pneumococcal vaccination (V03 82) (Z23)   17  Screening for cardiovascular, respiratory, and genitourinary diseases  (V81 2,V81 4,V81 6) (Z13 6,Z13 83,Z13 89)   18  Screening for cervical cancer (V76 2) (Z12 4)   19  Screening for colorectal cancer (V76 51) (Z12 11,Z12 12)   20  Screening for genitourinary condition (V81 6) (Z13 89)   21  Screening for neurological condition (V80 09) (Z13 89)   22  Tick bite (919 4,E906 4) [facility])    Past Medical History  1  Sinusitis (473 9) (J32 9)    The active problems and past medical history were reviewed and updated today  Surgical History  1  History of Foot Surgery   2  History of Gallbladder Surgery    Family History  Mother    1  Family history of cardiac disorder (V17 49) (Z82 49)   2  Family history of diabetes mellitus (V18 0) (Z83 3)   3  Family history of Hypertension (V17 49)  Sister    4  Family history of Breast Cancer (V16 3)  Brother    5  Family history of Colon Cancer (V16 0)   6  Family history of cardiac disorder (V17 49) (Z82 49)   7  Family history of diabetes mellitus (V18 0) (Z83 3)   8  Family history of Heart Disease (V17 49)   9   Family history of Hypertension (V17 49)    Social History   · Denied: History of Alcohol Use (History)   · Always uses seat belt   · Currently smokes tobacco (305 1) (F17 200)   · Daily Coffee Consumption (___ Cups/Day)   · Denied: History of Daily Cola Consumption (___ Cans/Day)   · Denied: History of Daily Tea Consumption (___ Cups/Day)   · Exercise: Walking   · Has 4 children   · High school graduate   · Marital History - Single   · No drug use   · No guns in the home   · No living will   · No Taoist beliefs   · 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, adequate (per day)  The social history was reviewed and updated today  Current Meds   1  Aspirin 81 MG CAPS; Therapy: (Recorded:79Xsh4229) to Recorded   2  Levothyroxine Sodium 125 MCG Oral Tablet; TAKE ONE TABLET BY MOUTH ONCE DAILY AS DIRECTED; Therapy: 64Mlw4367 to (Delta Stade)  Requested for: 44ETC6639; Last Rx:85Bit3066 Ordered    The medication list was reviewed and updated today  Allergies  1  Naprosyn SUSP   2  Penicillin V Potassium TABS   3  Vicodin TABS   4  Penicillins  5  Bee sting    Vitals  Vital Signs    Recorded: 00DOH8941 07:54AM   Temperature 96 8 F   Heart Rate 82   Respiration 14   Systolic 000   Diastolic 80   Height 5 ft 3 25 in   Weight 197 lb 3 2 oz   BMI Calculated 34 65   BSA Calculated 1 93     Physical Exam   Constitutional  General appearance: No acute distress, well appearing and well nourished  Head and Face  Head and face: Normal    Palpation of the face and sinuses: No sinus tenderness  Eyes  Conjunctiva and lids: No swelling, erythema or discharge  Pupils and irises: Equal, round, reactive to light  Ophthalmoscopic examination: Normal fundi and optic discs  Ears, Nose, Mouth, and Throat  External inspection of ears and nose: Normal    Otoscopic examination: Tympanic membranes translucent with normal light reflex  Canals patent without erythema  Nasal mucosa, septum, and turbinates: Normal without edema or erythema  Lips, teeth, and gums: Normal, good dentition  Oropharynx: Normal with no erythema, edema, exudate or lesions  Neck  Neck: Supple, symmetric, trachea midline, no masses  -- No JVD    Pulmonary Respiratory effort: No increased work of breathing or signs of respiratory distress  Auscultation of lungs: Clear to auscultation  Cardiovascular  Auscultation of heart: Normal rate and rhythm, normal S1 and S2, no murmurs  -- No S3 appreciated  Carotid pulses: 2+ bilaterally  Abdominal aorta: Normal    Pedal pulses: 2+ bilaterally  Peripheral vascular exam: Normal    Examination of extremities for edema and/or varicosities: Normal    Abdomen  Abdomen: Non-tender, no masses  Liver and spleen: No hepatomegaly or splenomegaly  Lymphatic  Palpation of lymph nodes in neck: No lymphadenopathy  Palpation of lymph nodes in other areas: No lymphadenopathy  Musculoskeletal  Gait and station: Normal    Digits and nails: Normal without clubbing or cyanosis  Joints, bones, and muscles: Normal    Neurologic  Cranial nerves: Cranial nerves II-XII intact  Cortical function: Normal mental status  Sensation: No sensory loss  Health Management  Screening for cervical cancer   (every) THINPREP TIS PAP RFX HPV; every 5 years; Next Due: 01TDN1860; Overdue  Screening for colorectal cancer   COLONOSCOPY; every 10 years; Next Due: 87WAI4614; Overdue    Future Appointments    Date/Time Provider Specialty Site   01/24/2018 09:45 AM Hansa Garcia, [facility] Neurology [facility] NEUROLOGY [facility]   06/18/2018 09:30 AM CARA Marte   [address] [facility]     Signatures   Electronically signed by : CARA Pérez ; Dec 18 2017  9:23AM EST                       (Author)

## 2018-01-09 NOTE — RESULT NOTES
Verified Results  * CT LUNG SCREENING PROGRAM [id number] 12:13PM Sanya Brandt Order Number: [id number]    - Patient Instructions: To schedule this appointment, please contact Central Scheduling at [phone]  Test Name Result Flag Reference   CT LUNG SCREENING PROGRAM (Report)     CT CHEST LUNG CANCER SCREENING WITHOUT IV CONTRAST     INDICATION: Long term smoking history  COMPARISON: Chest film 2/12/2010     TECHNIQUE: Unenhanced CT examination of the chest was performed utilizing a low dose protocol  Axial, sagittal and coronal reformatted images were submitted for interpretation  This examination, like all CT scans performed in the [facility], was performed utilizing techniques to minimize radiation dose exposure, including the use of iterative reconstruction and automated exposure control  Coronal MIP images of the chest were also provided  FINDINGS:     LUNGS:    Mild centrilobular emphysema  No pulmonary mass  There is subtle groundglass density in the peripheral left upper lobe with a faintly centrilobular configuration  Differential considerations include postinflammatory reaction, possibly sequela of respiratory bronchiolitis interstitial lung disease,    atypical adenomatous hyperplasia with possibility of adenocarcinoma in situ felt much less likely given its somewhat nebulous appearance  No tracheal or endobronchial lesion  Small right posterolateral probable tracheal diverticulum  PLEURA: Unremarkable  HEART/GREAT VESSELS: Coronary artery calcifications  The heart is normal size  Small quantity of pericardial fluid noted  MEDIASTINUM AND RONI: Unremarkable  CHEST WALL AND LOWER NECK: Unremarkable  VISUALIZED STRUCTURES IN THE UPPER ABDOMEN:    Cholecystectomy clips  Otherwise, unremarkable noting the adrenal glands are not fully visualized  OSSEOUS STRUCTURES: No acute fracture  No destructive osseous lesion     Mild degenerative changes thoracic spine  IMPRESSION:     Subtle groundglass density peripheral left upper lobe with faintly centrilobular configuration  Differential considerations include postinflammatory reaction, possibly sequela of respiratory bronchiolitis interstitial lung disease, atypical adenomatous    hyperplasia with possibility of adenocarcinoma in situ felt much less likely given its somewhat nebulous appearance  No dominant lung mass  Mild COPD  Lung-RADS: Lung-RADS 3, probably benign  Follow-up CT chest in 6 months recommended to ensure stability  When ordering follow-up, please write for routine low-dose CT chest as the patient cannot currently continue in the screening program given an    abnormality has been detected  Coronary artery calcifications         ##sigslh##sigslh         ##fuslh6##fuslh6       Workstation performed: [id number]     Signed by:   Charly Fisher DO   11/22/16

## 2018-01-10 NOTE — MISCELLANEOUS
Message  Return to work or school:   Janna Gonzalez is under my professional care  She was seen in my office on 08/24/2017   She is able to return to work on  08/28/2017       Steve Soliz        Signatures   Electronically signed by : CARA Walker ; Aug 25 2017  6:47AM EST                       (Author)

## 2018-01-11 NOTE — PROGRESS NOTES
History of Present Illness  ED Outreach:   ED Visit Information  ED visit date: 08/29/2017  Diagnosis description: MIGRAINE, INTRACTABLE, WITHOUT STATUS MIGRAINOSUS  [facility]   Discharge status: Admitted for Observation  Number of ED visits to date: 1  ED severity: 5  In network  Outreach Information  Outreach not needed  Date finalized: 09/28/2017  Care Coordination  Emergent necessity warranted by diagnosis  Did not call PCP first  Follow up appointment with PCP  Date and time of follow up: 09/05/2017  Patient went to ED instead of UC or PCP - perceived severity of illness  Pt admitted from ED  Pt not transferred to other inpatient facility  SALLY not scheduled  Patient with existing specialty follow up appointments in network  Comments:   Patient presented to ED on 08/29/2017 with headache, left sided weakness and visual changes in OS  B/P elevated  Patient had a a follow up with PCP on 09/05/2017 and a nurse visit on 09/12/2017  Future Appointments    Date/Time Provider Specialty Site   10/04/2017 09:00 AM Myrna Mancera MD Neurology [facility] NEUROLOGY [facility]  [facility]   11/16/2017 08:00 AM CARA Azar [facility] [address]     Signatures   Electronically signed by :  Batool Hidalgo, ; Sep 28 2017  3:10PM EST                       (Author)

## 2018-01-11 NOTE — RESULT NOTES
Verified Results  * CT CHEST 222 SNTMNT [id number] 09:42AM Irina Nolasco Order Number: [id number]    - Patient Instructions: To schedule this appointment, please contact Central Scheduling at [phone]  Test Name Result Flag Reference   CT CHEST WO CONTRAST (Report)     This is a summary report  The complete report is available in the patient's medical record  If you cannot access the medical record, please contact the sending organization for a detailed fax or copy  CT CHEST WITHOUT IV CONTRAST     INDICATION: Abnormal CT chest lung nodule     COMPARISON: November 20, 2016     TECHNIQUE: CT examination of the chest was performed without intravenous contrast  Reformatted images were created in axial, sagittal, and coronal planes  Radiation dose length product (DLP) for this visit: 702 mGy-cm   This examination, like all CT scans performed in the [facility], was performed utilizing techniques to minimize radiation dose exposure, including the use of iterative    reconstruction and automated exposure control  FINDINGS:     LUNGS: The region of groundglass opacity within the left upper lobe remains present and stable  There is no new parenchymal opacity  Right lung remains clear  There is no endobronchial lesion  PLEURA: Unremarkable  HEART/GREAT VESSELS: Stable small pericardial effusion  MEDIASTINUM AND RONI: Unremarkable  CHEST WALL AND LOWER NECK: Unremarkable  VISUALIZED STRUCTURES IN THE UPPER ABDOMEN: Patient status post cholecystectomy  There are 2 small nodular densities adjacent to the inferior aspect of the liver laterally on the right  The each measuring 8 or 9 mm in size  These were noted on prior    CT from March 20, 2011 of the abdomen and are relatively stable  OSSEOUS STRUCTURES: No acute fracture  No destructive osseous lesion  IMPRESSION:     Stable groundglass opacity within the left upper lobe   Continued follow-up is recommended at 6 months and then every 2 years until 5 years     ##fuslh6##fuslh6       Workstation performed: [id number]     Signed by:   Monika Hopkins MD   7/26/17   RAD_DOSE   Modality Radiation Exposure Data    Order Radiation    Type Dose Range    Radiation Dose 702 mGy-cm 0 - 6000 mGy-cm

## 2018-01-12 NOTE — MISCELLANEOUS
Message  Return to work or school:   Rekha Hollingsworth is under my professional care   She was seen in my office on 09/12/2017   She is able to return to work on  09/13/2017       Freddie Kramer MD       Signatures   Electronically signed by : CARA Castorena ; Sep 13 2017 12:38PM EST                       (Author)

## 2018-01-13 VITALS — DIASTOLIC BLOOD PRESSURE: 82 MMHG | SYSTOLIC BLOOD PRESSURE: 142 MMHG

## 2018-01-14 VITALS
SYSTOLIC BLOOD PRESSURE: 120 MMHG | BODY MASS INDEX: 34.38 KG/M2 | RESPIRATION RATE: 12 BRPM | WEIGHT: 194 LBS | HEART RATE: 90 BPM | DIASTOLIC BLOOD PRESSURE: 80 MMHG | OXYGEN SATURATION: 97 % | HEIGHT: 63 IN

## 2018-01-14 VITALS
WEIGHT: 198 LBS | DIASTOLIC BLOOD PRESSURE: 82 MMHG | SYSTOLIC BLOOD PRESSURE: 138 MMHG | TEMPERATURE: 98 F | HEART RATE: 84 BPM | BODY MASS INDEX: 35.08 KG/M2 | HEIGHT: 63 IN

## 2018-01-14 VITALS
BODY MASS INDEX: 33.93 KG/M2 | TEMPERATURE: 98.8 F | DIASTOLIC BLOOD PRESSURE: 104 MMHG | HEIGHT: 63 IN | HEART RATE: 84 BPM | WEIGHT: 191.5 LBS | SYSTOLIC BLOOD PRESSURE: 174 MMHG

## 2018-01-16 NOTE — PROGRESS NOTES
Chief Complaint  pt is here today for 1 week bp check, she is currently not taking any medications  she brought in a list of home readings with her  today at visit bp was 142/92  states her bp goes up when she is in the dr office  per dr Rio Tee her readings are good  no follow up needed  Active Problems    1  Abnormal chest CT (793 2) (R93 8)   2  Acute epigastric pain (789 06,338 19) (R10 13)   3  Cervicalgia (723 1) (M54 2)   4  Depression screening (V79 0) (Z13 89)   5  Dyspnea (786 09) (R06 00)   6  Elevated blood pressure   7  Encounter for screening mammogram for breast cancer (V76 12) (Z12 31)   8  Headache (784 0) (R51)   9  Hypertension (401 9) (I10)   10  Hypothyroidism (244 9) (E03 9)   11  Lightheadedness (780 4) (R42)   12  Nausea (787 02) (R11 0)   13  Need for influenza vaccination (V04 81) (Z23)   14  Need for pneumococcal vaccination (V03 82) (Z23)   15  Screening for cardiovascular, respiratory, and genitourinary diseases    (V81 2,V81 4,V81 6) (Z13 6,Z13 83,Z13 89)   16  Screening for cervical cancer (V76 2) (Z12 4)   17  Screening for colorectal cancer (V76 51) (Z12 11,Z12 12)   18  Screening for genitourinary condition (V81 6) (Z13 89)   19  Screening for neurological condition (V80 09) (Z13 89)   20  Tick bite (919 4,E906 4) (W57 XXXA)    Current Meds   1  Aspirin 81 MG CAPS; Therapy: (Recorded:05Sep2017) to Recorded   2  Levothyroxine Sodium 125 MCG Oral Tablet; TAKE ONE TABLET BY MOUTH ONCE   DAILY AS DIRECTED; Therapy: 13Rmy7906 to (Evaluate:24Nov2017)  Requested for: 22TXS9103; Last   Rx:29Nov2016 Ordered    Allergies    1  Naprosyn SUSP   2  Penicillin V Potassium TABS   3  Vicodin TABS   4  Penicillins    5   Bee sting    Vitals  Signs    Systolic: 492, LUE, Sitting  Diastolic: 82, LUE, Sitting    Future Appointments    Date/Time Provider Specialty Site   10/04/2017 09:00 AM Harjinder Cifuentes MD Neurology [facility]UROLOG[facility]  [facility]   11/16/2017 08:00 AM Kamron CARA Lopez   [address] [facility]     Signatures   Electronically signed by : Ramila Neil, ; Sep 12 2017  1:13PM EST                       (Author)    Electronically signed by : CARA Johnson ; Sep 12 2017  9:40PM EST                       (Author)

## 2018-01-22 VITALS
WEIGHT: 194.13 LBS | SYSTOLIC BLOOD PRESSURE: 156 MMHG | DIASTOLIC BLOOD PRESSURE: 94 MMHG | HEART RATE: 88 BPM | TEMPERATURE: 98.7 F | HEIGHT: 63 IN | BODY MASS INDEX: 34.4 KG/M2

## 2018-01-22 VITALS — DIASTOLIC BLOOD PRESSURE: 84 MMHG | SYSTOLIC BLOOD PRESSURE: 166 MMHG

## 2018-01-23 VITALS
HEART RATE: 82 BPM | BODY MASS INDEX: 34.94 KG/M2 | WEIGHT: 197.2 LBS | RESPIRATION RATE: 14 BRPM | TEMPERATURE: 96.8 F | HEIGHT: 63 IN | DIASTOLIC BLOOD PRESSURE: 80 MMHG | SYSTOLIC BLOOD PRESSURE: 134 MMHG

## 2018-02-16 ENCOUNTER — APPOINTMENT (OUTPATIENT)
Dept: LAB | Facility: CLINIC | Age: 68
End: 2018-02-16
Payer: MEDICARE

## 2018-02-16 ENCOUNTER — TRANSCRIBE ORDERS (OUTPATIENT)
Dept: LAB | Facility: CLINIC | Age: 68
End: 2018-02-16

## 2018-02-16 DIAGNOSIS — Z01.812 PRE-OPERATIVE LABORATORY EXAMINATION: ICD-10-CM

## 2018-02-16 DIAGNOSIS — M25.00 HEMARTHROSIS, MULTIPLE SITES: Primary | ICD-10-CM

## 2018-02-16 LAB
ALBUMIN SERPL BCP-MCNC: 3.9 G/DL (ref 3.5–5)
ALP SERPL-CCNC: 73 U/L (ref 46–116)
ALT SERPL W P-5'-P-CCNC: 27 U/L (ref 12–78)
ANION GAP SERPL CALCULATED.3IONS-SCNC: 5 MMOL/L (ref 4–13)
AST SERPL W P-5'-P-CCNC: 22 U/L (ref 5–45)
BASOPHILS # BLD AUTO: 0.1 THOUSANDS/ΜL (ref 0–0.1)
BASOPHILS NFR BLD AUTO: 1 % (ref 0–1)
BILIRUB SERPL-MCNC: 0.33 MG/DL (ref 0.2–1)
BUN SERPL-MCNC: 13 MG/DL (ref 5–25)
CALCIUM SERPL-MCNC: 9 MG/DL (ref 8.3–10.1)
CHLORIDE SERPL-SCNC: 106 MMOL/L (ref 100–108)
CO2 SERPL-SCNC: 27 MMOL/L (ref 21–32)
CREAT SERPL-MCNC: 0.82 MG/DL (ref 0.6–1.3)
EOSINOPHIL # BLD AUTO: 0.3 THOUSAND/ΜL (ref 0–0.61)
EOSINOPHIL NFR BLD AUTO: 3 % (ref 0–6)
ERYTHROCYTE [DISTWIDTH] IN BLOOD BY AUTOMATED COUNT: 13.6 % (ref 11.6–15.1)
GFR SERPL CREATININE-BSD FRML MDRD: 74 ML/MIN/1.73SQ M
GLUCOSE P FAST SERPL-MCNC: 91 MG/DL (ref 65–99)
HCT VFR BLD AUTO: 46.8 % (ref 34.8–46.1)
HGB BLD-MCNC: 16.1 G/DL (ref 11.5–15.4)
LYMPHOCYTES # BLD AUTO: 2.63 THOUSANDS/ΜL (ref 0.6–4.47)
LYMPHOCYTES NFR BLD AUTO: 26 % (ref 14–44)
MCH RBC QN AUTO: 33.8 PG (ref 26.8–34.3)
MCHC RBC AUTO-ENTMCNC: 34.4 G/DL (ref 31.4–37.4)
MCV RBC AUTO: 98 FL (ref 82–98)
MONOCYTES # BLD AUTO: 1.1 THOUSAND/ΜL (ref 0.17–1.22)
MONOCYTES NFR BLD AUTO: 11 % (ref 4–12)
NEUTROPHILS # BLD AUTO: 5.81 THOUSANDS/ΜL (ref 1.85–7.62)
NEUTS SEG NFR BLD AUTO: 59 % (ref 43–75)
NRBC BLD AUTO-RTO: 0 /100 WBCS
PLATELET # BLD AUTO: 250 THOUSANDS/UL (ref 149–390)
PMV BLD AUTO: 12.7 FL (ref 8.9–12.7)
POTASSIUM SERPL-SCNC: 4.6 MMOL/L (ref 3.5–5.3)
PROT SERPL-MCNC: 7.7 G/DL (ref 6.4–8.2)
RBC # BLD AUTO: 4.77 MILLION/UL (ref 3.81–5.12)
SODIUM SERPL-SCNC: 138 MMOL/L (ref 136–145)
WBC # BLD AUTO: 9.97 THOUSAND/UL (ref 4.31–10.16)

## 2018-02-16 PROCEDURE — 80053 COMPREHEN METABOLIC PANEL: CPT

## 2018-02-16 PROCEDURE — 36415 COLL VENOUS BLD VENIPUNCTURE: CPT

## 2018-02-16 PROCEDURE — 85025 COMPLETE CBC W/AUTO DIFF WBC: CPT

## 2018-04-24 ENCOUNTER — OFFICE VISIT (OUTPATIENT)
Dept: FAMILY MEDICINE CLINIC | Facility: CLINIC | Age: 68
End: 2018-04-24
Payer: MEDICARE

## 2018-04-24 ENCOUNTER — TELEPHONE (OUTPATIENT)
Dept: FAMILY MEDICINE CLINIC | Facility: CLINIC | Age: 68
End: 2018-04-24

## 2018-04-24 VITALS
RESPIRATION RATE: 18 BRPM | HEART RATE: 86 BPM | WEIGHT: 194.6 LBS | DIASTOLIC BLOOD PRESSURE: 74 MMHG | SYSTOLIC BLOOD PRESSURE: 168 MMHG | HEIGHT: 64 IN | TEMPERATURE: 97.6 F | BODY MASS INDEX: 33.22 KG/M2

## 2018-04-24 DIAGNOSIS — M70.52 BURSITIS OF OTHER BURSA OF LEFT KNEE: Primary | ICD-10-CM

## 2018-04-24 PROCEDURE — 99213 OFFICE O/P EST LOW 20 MIN: CPT | Performed by: NURSE PRACTITIONER

## 2018-04-24 RX ORDER — PREDNISONE 10 MG/1
TABLET ORAL
Qty: 26 TABLET | Refills: 0 | Status: SHIPPED | OUTPATIENT
Start: 2018-04-24 | End: 2018-06-18 | Stop reason: ALTCHOICE

## 2018-04-24 RX ORDER — METHOCARBAMOL 500 MG/1
500 TABLET, FILM COATED ORAL 3 TIMES DAILY
Qty: 15 TABLET | Refills: 0 | Status: SHIPPED | OUTPATIENT
Start: 2018-04-24 | End: 2018-06-18 | Stop reason: ALTCHOICE

## 2018-04-24 NOTE — TELEPHONE ENCOUNTER
Patient called stating she would like to be seen today if possible  Since Monday the patient states her L knee is swollen,warm to touch and can barely put weight on it  She has tried everything she could think of and is getting concerned now     Patient is okay with seeing Kimber Young if Dr Harris does not have any appointments

## 2018-04-24 NOTE — PROGRESS NOTES
Patient ID: Jessica Quezada is a 80 y o  female  HPI: 80 y  o female presenting with knee swelling involving the left knee  Onset of the symptoms was 04/16/18  Inciting event: none known  Current symptoms include pain located above knee and behind knee on left  Pain is aggravated by rising after sitting and walking  Patient has had no prior knee problems  Evaluation to date: none   Treatment to date: avoidance of offending activity, OTC analgesics which are not very effective and rest     SUBJECTIVE    Family History   Problem Relation Age of Onset    Heart disease Mother     Diabetes Mother     Hypertension Mother     Breast cancer Sister     Colon cancer Brother     Heart disease Brother     Diabetes Brother     Hypertension Brother      Social History     Social History    Marital status: Single     Spouse name: N/A    Number of children: 4    Years of education: HIGH SCHOOL GRADUATE      Occupational History    RETIRED       Social History Main Topics    Smoking status: Current Every Day Smoker    Smokeless tobacco: Not on file    Alcohol use No    Drug use: No    Sexual activity: Not on file     Other Topics Concern    Not on file     Social History Narrative    Always uses seat belt    Daily coffee consumption    Denied: history of daily cola consumption    Denied: history of daily tea consumption    Exercise: walking    No guns in the home    No living will    No Roman Catholic beliefs    Water intake, adequate      Past Medical History:   Diagnosis Date    Disease of thyroid gland      Past Surgical History:   Procedure Laterality Date    CHOLECYSTECTOMY      FOOT SURGERY      GALLBLADDER SURGERY       Allergies   Allergen Reactions    Bee Venom     Naproxen Hives    Penicillins Hives    Vicodin  [Hydrocodone-Acetaminophen] Hives and Itching       Current Outpatient Prescriptions:     aspirin 81 mg chewable tablet, Chew 1 tablet daily, Disp: , Rfl: 0    levothyroxine 125 mcg tablet, Take 125 mcg by mouth daily, Disp: , Rfl:     methocarbamol (ROBAXIN) 500 mg tablet, Take 1 tablet (500 mg total) by mouth 3 (three) times a day for 5 days, Disp: 15 tablet, Rfl: 0    predniSONE 10 mg tablet, 3 tabs twice a day x 2 days, 2 tabs twice a day x2 days, 1 tab twice a day x2 days, 1 tab daily x2 days, than stop, Disp: 26 tablet, Rfl: 0    Review of Systems    Consitutional:  Denies chills, fatigue, fever and malaise   Pulmonary:  Denies cough, shortness of breath, dyspnea on exertion    Cardiovascular:  Denies chest pain/pressure   Abdomen:  Denies abdominal pain, nausea, vomiting, diarrhea, constipation    Hematology/Lymphatics:  Denies bruising or pallor of left lower extermity  Musculoskeletal:  Positive for gait disturbance, left knee joint pain, stiffness and swelling  Denies muscle weakness  Integumentary:  Denies ecchymosis, petechiae ,rash or lesions   Neurological:  Denies headaches, dizziness, confusion, loss of consciousness or behavioral changes  Psychological:  Denies anxiety, depression or sleep disturbances      OBJECTIVE    /74   Pulse 86   Temp 97 6 °F (36 4 °C)   Resp 18   Ht 5' 3 75" (1 619 m)   Wt 88 3 kg (194 lb 9 6 oz)   BMI 33 67 kg/m²     Constitutional:  Well appearing and in no acute distress  ENT:  ENT exam normal, no neck nodes or sinus tenderness   Pulmonary:  clear to auscultation bilaterally and no crackles, no wheezes, chest expansion normal  Cardiovascular:  S1S2, regular rate and rhythm  Gastrointestinal:  abdomen is soft without significant tenderness    Lymphatic:  no lymphadenopathy   Musculoskeletal:  Swelling of left quadriceps muscle and the popliteal synovial cyst   Left  popliteal synovial cyst tender to palpation with pain shooting downward to calf  no effusion, full active range of motion, no joint line tenderness, ligamentous structures intact    Skin:  skin color, texture and turgor are normal; no bruising, rashes or lesions noted  Neurologic:  Alert and oriented x 4 and Affect and mood normal      Assessment/Plan:  Diagnoses and all orders for this visit:    Bursitis of other bursa of left knee  -     predniSONE 10 mg tablet; 3 tabs twice a day x 2 days, 2 tabs twice a day x2 days, 1 tab twice a day x2 days, 1 tab daily x2 days, than stop  -     methocarbamol (ROBAXIN) 500 mg tablet;  Take 1 tablet (500 mg total) by mouth 3 (three) times a day for 5 days      Bursitis of other bursa of left knee  Reviewed with patient plan to treat with tapering course of Prednisone 10 mg and Methocarbamol 500 mg three times a day as needed  Reviewed with patient use of conservative measure :RICE (rest, ice, compression and elevate)  Patient instructed to call in 72 hours if not feeling better or if symptoms worsen

## 2018-06-13 PROCEDURE — 87624 HPV HI-RISK TYP POOLED RSLT: CPT | Performed by: OBSTETRICS & GYNECOLOGY

## 2018-06-13 PROCEDURE — G0145 SCR C/V CYTO,THINLAYER,RESCR: HCPCS | Performed by: OBSTETRICS & GYNECOLOGY

## 2018-06-14 ENCOUNTER — TRANSCRIBE ORDERS (OUTPATIENT)
Dept: ADMINISTRATIVE | Facility: HOSPITAL | Age: 68
End: 2018-06-14

## 2018-06-14 DIAGNOSIS — N95.0 POST-MENOPAUSAL BLEEDING: Primary | ICD-10-CM

## 2018-06-15 ENCOUNTER — LAB REQUISITION (OUTPATIENT)
Dept: LAB | Facility: HOSPITAL | Age: 68
End: 2018-06-15
Payer: MEDICARE

## 2018-06-15 DIAGNOSIS — Z01.419 ENCOUNTER FOR GYNECOLOGICAL EXAMINATION WITHOUT ABNORMAL FINDING: ICD-10-CM

## 2018-06-15 DIAGNOSIS — N95.0 POSTMENOPAUSAL BLEEDING: ICD-10-CM

## 2018-06-15 DIAGNOSIS — Z11.51 ENCOUNTER FOR SCREENING FOR HUMAN PAPILLOMAVIRUS (HPV): ICD-10-CM

## 2018-06-18 ENCOUNTER — OFFICE VISIT (OUTPATIENT)
Dept: FAMILY MEDICINE CLINIC | Facility: CLINIC | Age: 68
End: 2018-06-18
Payer: MEDICARE

## 2018-06-18 VITALS
HEART RATE: 96 BPM | SYSTOLIC BLOOD PRESSURE: 142 MMHG | BODY MASS INDEX: 33.55 KG/M2 | DIASTOLIC BLOOD PRESSURE: 80 MMHG | HEIGHT: 64 IN | TEMPERATURE: 98.1 F | WEIGHT: 196.5 LBS

## 2018-06-18 DIAGNOSIS — Z13.83 SCREENING FOR CARDIOVASCULAR, RESPIRATORY, AND GENITOURINARY DISEASES: ICD-10-CM

## 2018-06-18 DIAGNOSIS — Z11.59 NEED FOR HEPATITIS C SCREENING TEST: ICD-10-CM

## 2018-06-18 DIAGNOSIS — R03.0 WHITE COAT SYNDROME WITH HIGH BLOOD PRESSURE BUT WITHOUT HYPERTENSION: ICD-10-CM

## 2018-06-18 DIAGNOSIS — M81.0 AGE RELATED OSTEOPOROSIS, UNSPECIFIED PATHOLOGICAL FRACTURE PRESENCE: ICD-10-CM

## 2018-06-18 DIAGNOSIS — Z12.11 SCREEN FOR COLON CANCER: ICD-10-CM

## 2018-06-18 DIAGNOSIS — Z12.39 SCREENING FOR MALIGNANT NEOPLASM OF BREAST: ICD-10-CM

## 2018-06-18 DIAGNOSIS — R03.0 ELEVATED BLOOD PRESSURE READING: ICD-10-CM

## 2018-06-18 DIAGNOSIS — Z00.00 MEDICARE ANNUAL WELLNESS VISIT, SUBSEQUENT: Primary | ICD-10-CM

## 2018-06-18 DIAGNOSIS — Z13.89 SCREENING FOR CARDIOVASCULAR, RESPIRATORY, AND GENITOURINARY DISEASES: ICD-10-CM

## 2018-06-18 DIAGNOSIS — E03.9 PRIMARY HYPOTHYROIDISM: Chronic | ICD-10-CM

## 2018-06-18 DIAGNOSIS — Z13.6 SCREENING FOR CARDIOVASCULAR, RESPIRATORY, AND GENITOURINARY DISEASES: ICD-10-CM

## 2018-06-18 PROBLEM — Z12.4 SCREENING FOR CERVICAL CANCER: Status: ACTIVE | Noted: 2018-06-18

## 2018-06-18 PROCEDURE — G0439 PPPS, SUBSEQ VISIT: HCPCS | Performed by: FAMILY MEDICINE

## 2018-06-18 PROCEDURE — 99213 OFFICE O/P EST LOW 20 MIN: CPT | Performed by: FAMILY MEDICINE

## 2018-06-18 NOTE — PROGRESS NOTES
Assessment and Plan:    Problem List Items Addressed This Visit     Primary hypothyroidism - Primary (Chronic)     Check labs  Cont present dose until results are back         Relevant Orders    TSH, 3rd generation    White coat syndrome with high blood pressure but without hypertension     I reviewed with pt  BP remain elevated in office but better at home  Check labs  Monitor  Recheck 6m         Relevant Orders    CBC and differential    Comprehensive metabolic panel    Lipid panel    Elevated blood pressure reading    Relevant Orders    CBC and differential    Comprehensive metabolic panel    Lipid panel      Other Visit Diagnoses     Age related osteoporosis, unspecified pathological fracture presence        Relevant Orders    DXA bone density spine hip and pelvis    Screening for cardiovascular, respiratory, and genitourinary diseases        Relevant Orders    Lipid panel    Medicare annual wellness visit, subsequent        Screen for colon cancer        Need for hepatitis C screening test        Relevant Orders    Hepatitis C antibody    Screening for malignant neoplasm of breast        for mammo later this week        Health Maintenance Due   Topic Date Due    Hepatitis C Screening  1950    PAP SMEAR  1950    DXA SCAN  1950    DTaP,Tdap,and Td Vaccines (1 - Tdap) 02/07/1972    MAMMOGRAM  11/18/2017    GLAUCOMA SCREENING 67+ YR  02/07/2018         HPI:  Barrera Brandt is a 80 y o  female here for her Subsequent Wellness Visit      Patient Active Problem List   Diagnosis    Atypical migraine    Paresthesia of left arm    Elevated blood pressure reading without diagnosis of hypertension    Primary hypothyroidism    Screening for cervical cancer    White coat syndrome with high blood pressure but without hypertension    Elevated blood pressure reading     Past Medical History:   Diagnosis Date    Disease of thyroid gland      Past Surgical History:   Procedure Laterality Date    CHOLECYSTECTOMY      FOOT SURGERY      GALLBLADDER SURGERY       Family History   Problem Relation Age of Onset    Heart disease Mother     Diabetes Mother     Hypertension Mother     Breast cancer Sister     Colon cancer Brother     Heart disease Brother     Diabetes Brother     Hypertension Brother      History   Smoking Status    Current Every Day Smoker   Smokeless Tobacco    Not on file     History   Alcohol Use No      History   Drug Use No       Current Outpatient Prescriptions   Medication Sig Dispense Refill    aspirin 81 mg chewable tablet Chew 1 tablet daily  0    levothyroxine 125 mcg tablet Take 125 mcg by mouth daily       No current facility-administered medications for this visit        Allergies   Allergen Reactions    Bee Venom     Naproxen Hives    Penicillins Hives    Vicodin  [Hydrocodone-Acetaminophen] Hives and Itching     Immunization History   Administered Date(s) Administered    Influenza 10/16/2017    Influenza Quadrivalent, 6-35 Months IM 10/01/2014    Influenza TIV (IM) 01/28/2014    Pneumococcal Conjugate 13-Valent 10/16/2017    Pneumococcal Polysaccharide PPV23 10/01/2014       Patient Care Team:  Fawad Cota MD as PCP - General      Medicare Screening Tests and Risk Assessments:  AWV Clinical     ISAR:   Previous hospitalizations?:  No       Once in a Lifetime Medicare Screening:       Medicare Screening Tests and Risk Assessment:   AAA Risk Assessment    Osteoporosis Risk Assessment    HIV Risk Assessment        Drug and Alcohol Use:   Tobacco use    Cigarettes:  current smoker    Smokeless:  never used smokeless tobacco    Tobacco use duration    Packs per day:  1    Tobacco Cessation Readiness    Alcohol use    Alcohol use:  never    Alcohol Treatment Readiness   Illicit Drug Use    Drug use:  never        Diet & Exercise:   Diet   What is your diet?:  Regular   How many servings a day of the following:   Fruits and Vegetables:  1-2 Meat:  1-2, 0 Whole Grains:  1 Simple Carbs:  1   Dairy:  1 Soda:  0   Coffee:  5 or more Tea:  0   Exercise    Do you currently exercise?:  currently not exercising       Cognitive Impairment Screening:   Cognitive Impairment Screening    Do you have difficulty learning or retaining new information?:  No Do you have difficulty handling new tasks?:  No   Do you have difficulty with reasoning?:  No Do you have difficulty with spatial ability and orientation?:  No   Do you have difficulty with language?:  No Do you have difficulty with behavior?:  No       Functional Ability/Level of Safety:   Hearing    Hearing difficulties:  No Bilateral:  normal   Left:  normal Right:  normal   Hearing aid:  No    Hearing Impairment Assessment    Current Activities    Help needed with the folllowing:    Using the phone:  No Transportation:  No   Shopping:  No Preparing Meals:  No   Doing Housework:  No Doing Laundry:  No   Managing Medications:  No Managing Money:  No   ADL    Fall Risk   Have you fallen in the last 12 months?:  No Are you unsteady on your feet?:  No    Are you taking any medications that may cause fatigue or dizziness?:  No    Do you rush to the bathroom potentially risking a fall?:  No   Injury History       Home Safety:   Are there hazards in your environment?:  No   If you fell, would you need help to get back up from the ground?:  No Do you have problems or concerns getting in/out of a bed, chair, tub, or toilet?:  No   Do you feel unsteady when walking?:  No Is your activity limited by pain?:  No   Do you have handrails and grab-bars in the home?:  Yes Are emergency numbers kept by the phone and regularly updated?:  Yes   Are you and/or family members aware of the dangers of smoking in bed?:  Yes    Do you have working smoke alarms and fire extinguisher?:  Yes    Have you left the stove on unsupervised?:  No    Home Safety Risk Factors   Unfamilar with surroundings:  No Uneven floors:  No   Stairs or handrail saftey risk:  No Loose rugs:  No   Household clutter:  No Poor household lighting:  No   No grab bars in bathroom:  No Further evaluation needed:  No       Advanced Directives:   Advanced Directives    Living Will:  No Durable POA for healthcare:  No   Advanced directive:  No    Patient's End of Life Decisions        Urinary Incontinence:   Do you have urinary incontinence?:  No        Glaucoma:            Provider Screening     Preventative Screening/Counseling:   Cardiovascular Screening/Counseling:   (Labs Q5 years, EKG optional one-time)   General:  Risks and Benefits Discussed Counseling:  Healthy Diet, Healthy Weight, Improve Cholesterol, Improve Blood Pressure, Improve Exercise Tolerance Due for: Lab Panel/Analytes:  Lipid Panel         Diabetes Screening/Counseling:   (2 tests/year if Pre-Diabetes or 1 test/year if no Diabetes)   General:  Risks and Benefits Discussed Counseling:  Healthy Diet, Healthy Weight, Improve Physical Activity Due for: Labs:  Blood Glucose         Colorectal Cancer Screening/Counseling:   (FOBT Q1 yr; Flex Sig Q4 yrs or Q10 yrs after Screening Colonoscopy; Screening Colonoscpy Q2 yrs High Risk or Q10 yrs Low Risk; Barium Enema Q2 yrs High Risk or Q4 yrs Low Risk)   General:  Risks and Benefits Discussed, Screening Current           Prostate Cancer Screening/Counseling:   (Annual)          Breast Cancer Screening/Counseling:   (Baseline Age 28 - 43; Annual Age 36+)   General:  Risks and Benefits Discussed Due for: Studies:  mammogram         Cervical Cancer Screening/Counseling:   (Annual for High Risk or Childbearing Age with Abnormal Pap in Last 3 yrs;  Every 2 all others)   General:  Risks and Benefits Discussed, Screening Current           Osteoporosis Screening/Counseling:   (Every 2 Yrs if at risk or more if medically necessary)         AAA Screening/Counseling:   (Once per Lifetime with risk factors)          Glaucoma Screening/Counseling:   (Annual)   General:  Risks and Benefits Discussed, Screening Current          HIV Screening/Counseling:   (Voluntary; Once annually for high risk OR 3 times for Pregnancy at diagnosis of IUP; 3rd trimester; and at Labor         Hepatitis C Screening:   Hepatitis C Counseling Provided:  Yes Hepatitis C Screening Accepted:   Yes              Immunizations:   Influenza (annual):  Risks & Benefits Discussed, Influenza UTD This Year   Pneumococcal (Once in a Lifetime):  Risks & Benefits Discussed, Lifetime Vaccine Completed   Zostavax (Medicare D Coverage, Pt >70 yo):  Risks & Benefits Discussed       Other Preventative Couseling (Non-Medicare Wellness Visit Required):   nutrition counseling performed, fall prevention education provided, weight reduction was discussed, Increased physical activity counseling given       Referrals (Non-Medicare Wellness Visit Required):       Medical Equipment/Suppliers:

## 2018-06-18 NOTE — PROGRESS NOTES
Assessment/Plan:    Primary hypothyroidism  Check labs  Cont present dose until results are back    White coat syndrome with high blood pressure but without hypertension  I reviewed with pt  BP remain elevated in office but better at home  Check labs  Monitor  Recheck 6m       Diagnoses and all orders for this visit:    Primary hypothyroidism  -     TSH, 3rd generation; Future    White coat syndrome with high blood pressure but without hypertension  -     CBC and differential; Future  -     Comprehensive metabolic panel; Future  -     Lipid panel; Future    Age related osteoporosis, unspecified pathological fracture presence  -     DXA bone density spine hip and pelvis; Future    Elevated blood pressure reading  Comments:  secondary to white coat  Cont to monitor  Check labs  Orders:  -     CBC and differential; Future  -     Comprehensive metabolic panel; Future  -     Lipid panel; Future    Screening for cardiovascular, respiratory, and genitourinary diseases  -     Lipid panel; Future    Other orders  -     Cancel: Ambulatory referral to Gynecology; Future  -     Cancel: Ambulatory referral to Ophthalmology; Future  -     Cancel: Mammo screening bilateral w cad; Future          Subjective:      Patient ID: Abdi Awan is a 80 y o  female  f/u multiple med issues and for AWV  - pt recently had episode of uterine bleeding  Pt seen by Dr Lucia Ingram  Pap done and pelvic U/S scheduled for Wed  Further recommendations pending results  - to have mammo tomorrow  - pt denies CP, palp, lightheadedness or other CV symptoms with or without exertion  - no GI complaints  - knee issues resolved  - still smoking approx 1/2 ppd  No interest in stopping  The following portions of the patient's history were reviewed and updated as appropriate:   She  has a past medical history of Disease of thyroid gland    She   Patient Active Problem List    Diagnosis Date Noted    Screening for cervical cancer 06/18/2018   Cierra Kumari coat syndrome with high blood pressure but without hypertension 06/18/2018    Elevated blood pressure reading 06/18/2018    Atypical migraine 08/29/2017    Paresthesia of left arm 08/29/2017    Elevated blood pressure reading without diagnosis of hypertension 08/29/2017    Primary hypothyroidism 08/29/2017     She  has a past surgical history that includes Cholecystectomy; Foot surgery; and Gallbladder surgery  Her family history includes Breast cancer in her sister; Colon cancer in her brother; Diabetes in her brother and mother; Heart disease in her brother and mother; Hypertension in her brother and mother  She  reports that she has been smoking  She does not have any smokeless tobacco history on file  She reports that she does not drink alcohol or use drugs  Current Outpatient Prescriptions   Medication Sig Dispense Refill    aspirin 81 mg chewable tablet Chew 1 tablet daily  0    levothyroxine 125 mcg tablet Take 125 mcg by mouth daily       No current facility-administered medications for this visit  She is allergic to bee venom; naproxen; penicillins; and vicodin  [hydrocodone-acetaminophen]       Review of Systems   Constitutional: Negative  HENT: Negative  Eyes: Negative  Respiratory: Negative  Cardiovascular: Negative  Gastrointestinal: Negative  Endocrine: Negative  Genitourinary: Positive for vaginal bleeding  Negative for difficulty urinating, flank pain, frequency, hematuria and vaginal pain  Musculoskeletal: Negative  Skin: Negative  Allergic/Immunologic: Negative  Neurological: Negative  Hematological: Negative  Psychiatric/Behavioral: Negative            Objective:      /80   Pulse 96   Temp 98 1 °F (36 7 °C)   Ht 5' 3 75" (1 619 m)   Wt 89 1 kg (196 lb 8 oz)   BMI 33 99 kg/m²          Physical Exam

## 2018-06-18 NOTE — PATIENT INSTRUCTIONS
Thank you for enrolling in [facility]  Please follow the instructions below to securely access your online medical record  [employer] allows you to send messages to your doctor, view your test results, renew your prescriptions, schedule appointments, and more  [address] uses Single Sign on (SSO) Technology for you to log in and access our [facility], including Remote[employer]ality  No more remembering multiple user names and passwords! We are going to guide you through, step by step, to help you set up your [employer] account which will provide access to your [employer] account  How Do I Sign Up? 1  In your Internet browser, go to [email or web address]       2  Click on the [facility] patient account and then click Dont have an                 Account? Create one now      3  Enter your demographic information and chose a user name (email address) and password  Think of one that is secure and easy to remember  Enter a Referral code if you have one (this is not your [employer] code ) Accept the Terms and Conditions and the Privacy Policy  4  Select your security questions that you will use to reset your password should you forget it  Click Submit  5  Enter your [employer] Activation Code exactly as it appears below  You will not need to use this code after you have completed the sign-up process  If you do not sign up before the expiration date, you must request a new code  RemoteReality Activation Code: Activation code not generated  Current RemoteReality Status: Patient Declined    6  Confirm your email address  An email confirmation was sent to you  Please open that email and click Confirm your Email   You should then be redirected to our [employer] Single sign on page, where you will log on with the user name and password you created! Proceed to the RemoteRe[employer] Icon to view your personal health information          Additional Information  If you have questions, you can e-mail [email or web address] or call [phone] to talk to our customer support staff  Remember, MyChart is NOT to be used for urgent needs  For medical emergencies, dial [phone]

## 2018-06-18 NOTE — ASSESSMENT & PLAN NOTE
I reviewed with pt  BP remain elevated in office but better at home  Check labs  Monitor   Recheck 6m

## 2018-06-19 ENCOUNTER — HOSPITAL ENCOUNTER (OUTPATIENT)
Dept: MAMMOGRAPHY | Facility: HOSPITAL | Age: 68
Discharge: HOME | End: 2018-06-19
Payer: MEDICARE

## 2018-06-19 DIAGNOSIS — Z12.31 ENCOUNTER FOR SCREENING MAMMOGRAM FOR MALIGNANT NEOPLASM OF BREAST: ICD-10-CM

## 2018-06-19 PROCEDURE — 77067 SCR MAMMO BI INCL CAD: CPT

## 2018-06-20 ENCOUNTER — HOSPITAL ENCOUNTER (OUTPATIENT)
Dept: ULTRASOUND IMAGING | Facility: HOSPITAL | Age: 68
Discharge: HOME | End: 2018-06-20
Attending: OBSTETRICS & GYNECOLOGY
Payer: MEDICARE

## 2018-06-20 DIAGNOSIS — N95.0 POST-MENOPAUSAL BLEEDING: ICD-10-CM

## 2018-06-20 DIAGNOSIS — R92.8 ABNORMAL MAMMOGRAM OF LEFT BREAST: Primary | ICD-10-CM

## 2018-06-20 LAB
HPV RRNA GENITAL QL NAA+PROBE: ABNORMAL
LAB AP GYN PRIMARY INTERPRETATION: NORMAL
Lab: NORMAL

## 2018-06-20 PROCEDURE — 76856 US EXAM PELVIC COMPLETE: CPT

## 2018-06-20 PROCEDURE — 76830 TRANSVAGINAL US NON-OB: CPT

## 2018-06-20 NOTE — PROGRESS NOTES
Pt returned pc re: Abn mammo result  Pt aware she needs to have additional testing done- [facility] order entered and I informed the patient if she does not hear from [facility] in a few days, she is to call us back

## 2018-06-22 ENCOUNTER — APPOINTMENT (OUTPATIENT)
Dept: LAB | Facility: CLINIC | Age: 68
End: 2018-06-22
Payer: MEDICARE

## 2018-06-22 DIAGNOSIS — R03.0 WHITE COAT SYNDROME WITH HIGH BLOOD PRESSURE BUT WITHOUT HYPERTENSION: ICD-10-CM

## 2018-06-22 DIAGNOSIS — R03.0 ELEVATED BLOOD PRESSURE READING: ICD-10-CM

## 2018-06-22 DIAGNOSIS — Z13.6 SCREENING FOR CARDIOVASCULAR, RESPIRATORY, AND GENITOURINARY DISEASES: ICD-10-CM

## 2018-06-22 DIAGNOSIS — Z13.83 SCREENING FOR CARDIOVASCULAR, RESPIRATORY, AND GENITOURINARY DISEASES: ICD-10-CM

## 2018-06-22 DIAGNOSIS — Z13.89 SCREENING FOR CARDIOVASCULAR, RESPIRATORY, AND GENITOURINARY DISEASES: ICD-10-CM

## 2018-06-22 DIAGNOSIS — E03.9 PRIMARY HYPOTHYROIDISM: Chronic | ICD-10-CM

## 2018-06-22 LAB
ALBUMIN SERPL BCP-MCNC: 3.8 G/DL (ref 3.5–5)
ALP SERPL-CCNC: 70 U/L (ref 46–116)
ALT SERPL W P-5'-P-CCNC: 34 U/L (ref 12–78)
ANION GAP SERPL CALCULATED.3IONS-SCNC: 7 MMOL/L (ref 4–13)
AST SERPL W P-5'-P-CCNC: 23 U/L (ref 5–45)
BASOPHILS # BLD AUTO: 0.11 THOUSANDS/ΜL (ref 0–0.1)
BASOPHILS NFR BLD AUTO: 1 % (ref 0–1)
BILIRUB SERPL-MCNC: 0.4 MG/DL (ref 0.2–1)
BUN SERPL-MCNC: 20 MG/DL (ref 5–25)
CALCIUM SERPL-MCNC: 8.8 MG/DL (ref 8.3–10.1)
CHLORIDE SERPL-SCNC: 107 MMOL/L (ref 100–108)
CHOLEST SERPL-MCNC: 243 MG/DL (ref 50–200)
CO2 SERPL-SCNC: 24 MMOL/L (ref 21–32)
CREAT SERPL-MCNC: 0.91 MG/DL (ref 0.6–1.3)
EOSINOPHIL # BLD AUTO: 0.32 THOUSAND/ΜL (ref 0–0.61)
EOSINOPHIL NFR BLD AUTO: 4 % (ref 0–6)
ERYTHROCYTE [DISTWIDTH] IN BLOOD BY AUTOMATED COUNT: 13.1 % (ref 11.6–15.1)
GFR SERPL CREATININE-BSD FRML MDRD: 65 ML/MIN/1.73SQ M
GLUCOSE P FAST SERPL-MCNC: 92 MG/DL (ref 65–99)
HCT VFR BLD AUTO: 50.9 % (ref 34.8–46.1)
HCV AB SER QL: NORMAL
HDLC SERPL-MCNC: 36 MG/DL (ref 40–60)
HGB BLD-MCNC: 16.9 G/DL (ref 11.5–15.4)
IMM GRANULOCYTES # BLD AUTO: 0.05 THOUSAND/UL (ref 0–0.2)
IMM GRANULOCYTES NFR BLD AUTO: 1 % (ref 0–2)
LDLC SERPL CALC-MCNC: 164 MG/DL (ref 0–100)
LYMPHOCYTES # BLD AUTO: 3.18 THOUSANDS/ΜL (ref 0.6–4.47)
LYMPHOCYTES NFR BLD AUTO: 35 % (ref 14–44)
MCH RBC QN AUTO: 32.8 PG (ref 26.8–34.3)
MCHC RBC AUTO-ENTMCNC: 33.2 G/DL (ref 31.4–37.4)
MCV RBC AUTO: 99 FL (ref 82–98)
MONOCYTES # BLD AUTO: 0.88 THOUSAND/ΜL (ref 0.17–1.22)
MONOCYTES NFR BLD AUTO: 10 % (ref 4–12)
NEUTROPHILS # BLD AUTO: 4.59 THOUSANDS/ΜL (ref 1.85–7.62)
NEUTS SEG NFR BLD AUTO: 49 % (ref 43–75)
NONHDLC SERPL-MCNC: 207 MG/DL
NRBC BLD AUTO-RTO: 0 /100 WBCS
PLATELET # BLD AUTO: 226 THOUSANDS/UL (ref 149–390)
PMV BLD AUTO: 11.8 FL (ref 8.9–12.7)
POTASSIUM SERPL-SCNC: 4.4 MMOL/L (ref 3.5–5.3)
PROT SERPL-MCNC: 7.6 G/DL (ref 6.4–8.2)
RBC # BLD AUTO: 5.15 MILLION/UL (ref 3.81–5.12)
SODIUM SERPL-SCNC: 138 MMOL/L (ref 136–145)
TRIGL SERPL-MCNC: 216 MG/DL
TSH SERPL DL<=0.05 MIU/L-ACNC: 4.11 UIU/ML (ref 0.36–3.74)
WBC # BLD AUTO: 9.13 THOUSAND/UL (ref 4.31–10.16)

## 2018-06-22 PROCEDURE — 36415 COLL VENOUS BLD VENIPUNCTURE: CPT | Performed by: FAMILY MEDICINE

## 2018-06-22 PROCEDURE — 85025 COMPLETE CBC W/AUTO DIFF WBC: CPT

## 2018-06-22 PROCEDURE — 80053 COMPREHEN METABOLIC PANEL: CPT

## 2018-06-22 PROCEDURE — 80061 LIPID PANEL: CPT

## 2018-06-22 PROCEDURE — 86803 HEPATITIS C AB TEST: CPT | Performed by: FAMILY MEDICINE

## 2018-06-22 PROCEDURE — 84443 ASSAY THYROID STIM HORMONE: CPT

## 2018-06-28 ENCOUNTER — HOSPITAL ENCOUNTER (OUTPATIENT)
Dept: MAMMOGRAPHY | Facility: CLINIC | Age: 68
Discharge: HOME | End: 2018-06-28
Payer: MEDICARE

## 2018-06-28 ENCOUNTER — HOSPITAL ENCOUNTER (OUTPATIENT)
Dept: ULTRASOUND IMAGING | Facility: CLINIC | Age: 68
Discharge: HOME | End: 2018-06-28
Payer: MEDICARE

## 2018-06-28 DIAGNOSIS — R92.8 ABNORMAL MAMMOGRAM OF LEFT BREAST: ICD-10-CM

## 2018-06-28 DIAGNOSIS — R92.8 ABNORMAL MAMMOGRAM: ICD-10-CM

## 2018-06-28 PROCEDURE — G0279 TOMOSYNTHESIS, MAMMO: HCPCS

## 2018-06-28 PROCEDURE — 76642 ULTRASOUND BREAST LIMITED: CPT

## 2018-06-28 PROCEDURE — 77065 DX MAMMO INCL CAD UNI: CPT

## 2018-08-07 ENCOUNTER — OFFICE VISIT (OUTPATIENT)
Dept: FAMILY MEDICINE CLINIC | Facility: CLINIC | Age: 68
End: 2018-08-07
Payer: MEDICARE

## 2018-08-07 VITALS
TEMPERATURE: 98.4 F | BODY MASS INDEX: 32.85 KG/M2 | HEART RATE: 80 BPM | WEIGHT: 192.4 LBS | SYSTOLIC BLOOD PRESSURE: 142 MMHG | HEIGHT: 64 IN | DIASTOLIC BLOOD PRESSURE: 90 MMHG

## 2018-08-07 DIAGNOSIS — M25.462 EFFUSION OF LEFT KNEE: Primary | ICD-10-CM

## 2018-08-07 DIAGNOSIS — M25.562 ACUTE PAIN OF LEFT KNEE: ICD-10-CM

## 2018-08-07 PROCEDURE — 99213 OFFICE O/P EST LOW 20 MIN: CPT | Performed by: FAMILY MEDICINE

## 2018-08-07 PROCEDURE — 20610 DRAIN/INJ JOINT/BURSA W/O US: CPT | Performed by: FAMILY MEDICINE

## 2018-08-07 RX ORDER — LIDOCAINE HYDROCHLORIDE 10 MG/ML
2 INJECTION, SOLUTION INFILTRATION; PERINEURAL
Status: COMPLETED | OUTPATIENT
Start: 2018-08-07 | End: 2018-08-07

## 2018-08-07 RX ORDER — METHYLPREDNISOLONE ACETATE 40 MG/ML
1 INJECTION, SUSPENSION INTRA-ARTICULAR; INTRALESIONAL; INTRAMUSCULAR; SOFT TISSUE
Status: COMPLETED | OUTPATIENT
Start: 2018-08-07 | End: 2018-08-07

## 2018-08-07 RX ORDER — BUPIVACAINE HYDROCHLORIDE 2.5 MG/ML
2 INJECTION, SOLUTION INFILTRATION; PERINEURAL
Status: COMPLETED | OUTPATIENT
Start: 2018-08-07 | End: 2018-08-07

## 2018-08-07 RX ADMIN — LIDOCAINE HYDROCHLORIDE 2 ML: 10 INJECTION, SOLUTION INFILTRATION; PERINEURAL at 19:44

## 2018-08-07 RX ADMIN — BUPIVACAINE HYDROCHLORIDE 2 ML: 2.5 INJECTION, SOLUTION INFILTRATION; PERINEURAL at 19:44

## 2018-08-07 RX ADMIN — METHYLPREDNISOLONE ACETATE 1 ML: 40 INJECTION, SUSPENSION INTRA-ARTICULAR; INTRALESIONAL; INTRAMUSCULAR; SOFT TISSUE at 19:44

## 2018-08-07 NOTE — PROGRESS NOTES
Assessment/Plan:      Diagnoses and all orders for this visit:    Effusion of left knee  -     XR knee 3 vw left non injury; Future    Acute pain of left knee  -     XR knee 3 vw left non injury; Future    Other orders  -     Large joint arthrocentesis     Discussion:  Pt with decreased pain and improved ROM s/p aspiration/injection of R knee  I reviewed wound care, post injection instructions and signs of infection  Recheck 1 week if not improved - earlier if worse  Check knee Xray  Pt to call for problems or concerns in the interim    Subjective:     Patient ID: Mumtaz Lozano is a 80 y o  female  4 day hx of L knee pain with swelling  No trauma or injury  Pt has been babysitting a lot lately  (+) decreased ROM  No hip or ankle pain  No distal numbness or weakness  No known hx of knee OA or previous knee injury        Review of Systems   Constitutional: Negative  Cardiovascular: Negative for leg swelling  Musculoskeletal: Positive for arthralgias, gait problem and joint swelling  Negative for myalgias  Skin: Negative  Neurological: Negative for weakness and numbness  Objective:     Physical Exam   Constitutional: She appears well-developed and well-nourished  Musculoskeletal:        Left knee: She exhibits decreased range of motion and effusion  She exhibits no LCL laxity, normal patellar mobility and no MCL laxity  Tenderness found  Medial joint line and lateral joint line tenderness noted  No MCL and no LCL tenderness noted  Neurological: No sensory deficit  Skin: Skin is warm         Large joint arthrocentesis  Date/Time: 8/7/2018 7:44 PM  Consent given by: patient  Site marked: site marked  Supporting Documentation  Indications: pain and joint swelling   Procedure Details  Location: knee -   Needle size: 20 G  Ultrasound guidance: no  Approach: lateral  Medications administered: 2 mL bupivacaine 0 25 %; 2 mL lidocaine 1 %; 1 mL methylPREDNISolone acetate 40 mg/mL    Aspirate amount: 27 mL  Aspirate: clear, yellow and serous  Patient tolerance: patient tolerated the procedure well with no immediate complications  Dressing:  Sterile dressing applied (compression wrap applied)

## 2018-08-14 ENCOUNTER — OFFICE VISIT (OUTPATIENT)
Dept: FAMILY MEDICINE CLINIC | Facility: CLINIC | Age: 68
End: 2018-08-14
Payer: MEDICARE

## 2018-08-14 VITALS
HEIGHT: 64 IN | BODY MASS INDEX: 32.71 KG/M2 | HEART RATE: 92 BPM | WEIGHT: 191.6 LBS | TEMPERATURE: 96.4 F | DIASTOLIC BLOOD PRESSURE: 98 MMHG | SYSTOLIC BLOOD PRESSURE: 160 MMHG

## 2018-08-14 DIAGNOSIS — G89.29 CHRONIC PAIN OF LEFT KNEE: ICD-10-CM

## 2018-08-14 DIAGNOSIS — R03.0 ELEVATED BLOOD PRESSURE READING WITHOUT DIAGNOSIS OF HYPERTENSION: Primary | ICD-10-CM

## 2018-08-14 DIAGNOSIS — Z12.2 ENCOUNTER FOR SCREENING FOR LUNG CANCER: ICD-10-CM

## 2018-08-14 DIAGNOSIS — M25.562 CHRONIC PAIN OF LEFT KNEE: ICD-10-CM

## 2018-08-14 DIAGNOSIS — R03.0 WHITE COAT SYNDROME WITH HIGH BLOOD PRESSURE BUT WITHOUT HYPERTENSION: ICD-10-CM

## 2018-08-14 DIAGNOSIS — Z53.20 LUNG CANCER SCREENING DECLINED BY PATIENT: ICD-10-CM

## 2018-08-14 DIAGNOSIS — G43.109 MIGRAINE WITH AURA AND WITHOUT STATUS MIGRAINOSUS, NOT INTRACTABLE: ICD-10-CM

## 2018-08-14 DIAGNOSIS — E03.9 PRIMARY HYPOTHYROIDISM: Chronic | ICD-10-CM

## 2018-08-14 PROCEDURE — 99214 OFFICE O/P EST MOD 30 MIN: CPT | Performed by: FAMILY MEDICINE

## 2018-08-14 NOTE — PROGRESS NOTES
Assessment/Plan:    Primary hypothyroidism  Last TSH was sl elevated in June  Recheck TSH in Dec    Migraine with aura and without status migrainosus, not intractable  Refer for eye exam  Cont to monitor    White coat syndrome with high blood pressure but without hypertension  Home Bps controlled  Cont present meds       Diagnoses and all orders for this visit:    Elevated blood pressure reading without diagnosis of hypertension    White coat syndrome with high blood pressure but without hypertension    Primary hypothyroidism    Lung cancer screening declined by patient    Encounter for screening for lung cancer  -     CT chest wo contrast; Future    Chronic pain of left knee  -     XR knee 3 vw left non injury; Future    Migraine with aura and without status migrainosus, not intractable          Subjective:      Patient ID: Meryle Prime is a 80 y o  female  f/u multiple med issues  - pt has been monitoring home Bps - typically 110-120/70-80    - occ  HA when she reads too long  Occurs on L side and is preceded by scotoma  May be severe at time  Have not changed in freq or severity "in years"  CT of head last Aug was normal  - no CP, palp, lightheadedness with or without exertion  - L knee betters/p injection  Ambulating better  - still smoking  Due for repeat lung CT        The following portions of the patient's history were reviewed and updated as appropriate:   She  has a past medical history of Disease of thyroid gland    She   Patient Active Problem List    Diagnosis Date Noted    Migraine with aura and without status migrainosus, not intractable 08/14/2018    Screening for cervical cancer 06/18/2018    White coat syndrome with high blood pressure but without hypertension 06/18/2018    Elevated blood pressure reading 06/18/2018    Atypical migraine 08/29/2017    Paresthesia of left arm 08/29/2017    Elevated blood pressure reading without diagnosis of hypertension 08/29/2017    Primary hypothyroidism 08/29/2017     She  has a past surgical history that includes Cholecystectomy; Foot surgery; and Gallbladder surgery  She  reports that she has been smoking  She does not have any smokeless tobacco history on file  She reports that she does not drink alcohol or use drugs  Current Outpatient Prescriptions   Medication Sig Dispense Refill    aspirin 81 mg chewable tablet Chew 1 tablet daily  0    levothyroxine 125 mcg tablet Take 125 mcg by mouth daily       No current facility-administered medications for this visit  She is allergic to bee venom; naproxen; penicillins; and vicodin  [hydrocodone-acetaminophen]       Review of Systems   Constitutional: Negative  HENT: Negative  Eyes: Negative  Respiratory: Negative  Cardiovascular: Negative  Gastrointestinal: Negative  Endocrine: Negative  Genitourinary: Negative  Musculoskeletal: Positive for arthralgias (mild diffuse)  Negative for gait problem and joint swelling  Skin: Negative  Allergic/Immunologic: Negative  Neurological: Positive for headaches  Negative for facial asymmetry, weakness, light-headedness and numbness  Hematological: Negative  Psychiatric/Behavioral: Negative  Objective:      /98   Pulse 92   Temp (!) 96 4 °F (35 8 °C)   Ht 5' 3 75" (1 619 m)   Wt 86 9 kg (191 lb 9 6 oz)   BMI 33 15 kg/m²          Physical Exam   Constitutional: She is oriented to person, place, and time  She appears well-developed and well-nourished  HENT:   Head: Normocephalic and atraumatic  Right Ear: External ear normal    Left Ear: External ear normal    Nose: Nose normal    Mouth/Throat: Oropharynx is clear and moist  No oropharyngeal exudate  Eyes: Conjunctivae and EOM are normal  Pupils are equal, round, and reactive to light  Neck: Normal range of motion  Neck supple  No JVD present  No thyromegaly present     Cardiovascular: Normal rate, regular rhythm, normal heart sounds and intact distal pulses  No murmur heard  Pulmonary/Chest: Effort normal and breath sounds normal    Abdominal: Soft  She exhibits no distension and no mass  There is no tenderness  Musculoskeletal:   L knee without effusion  Improved ROM   Lymphadenopathy:     She has no cervical adenopathy  Neurological: She is alert and oriented to person, place, and time  She has normal reflexes  She exhibits normal muscle tone  Coordination normal    Skin: Skin is warm and dry  She is not diaphoretic  Psychiatric: She has a normal mood and affect  Vitals reviewed

## 2018-09-06 ENCOUNTER — HOSPITAL ENCOUNTER (OUTPATIENT)
Dept: RADIOLOGY | Facility: HOSPITAL | Age: 68
Discharge: HOME | End: 2018-09-06
Payer: MEDICARE

## 2018-09-06 ENCOUNTER — HOSPITAL ENCOUNTER (OUTPATIENT)
Dept: CT IMAGING | Facility: HOSPITAL | Age: 68
Discharge: HOME | End: 2018-09-06
Payer: MEDICARE

## 2018-09-06 DIAGNOSIS — M25.562 CHRONIC PAIN OF LEFT KNEE: ICD-10-CM

## 2018-09-06 DIAGNOSIS — Z12.2 ENCOUNTER FOR SCREENING FOR LUNG CANCER: ICD-10-CM

## 2018-09-06 DIAGNOSIS — G89.29 CHRONIC PAIN OF LEFT KNEE: ICD-10-CM

## 2018-09-06 PROCEDURE — 73562 X-RAY EXAM OF KNEE 3: CPT

## 2018-11-27 DIAGNOSIS — E03.9 HYPOTHYROIDISM, UNSPECIFIED TYPE: Primary | ICD-10-CM

## 2018-11-27 RX ORDER — LEVOTHYROXINE SODIUM 0.12 MG/1
125 TABLET ORAL DAILY
Qty: 90 TABLET | Refills: 3 | Status: SHIPPED | OUTPATIENT
Start: 2018-11-27 | End: 2019-11-20 | Stop reason: SDUPTHER

## 2019-02-20 ENCOUNTER — OFFICE VISIT (OUTPATIENT)
Dept: FAMILY MEDICINE CLINIC | Facility: CLINIC | Age: 69
End: 2019-02-20
Payer: MEDICARE

## 2019-02-20 VITALS
DIASTOLIC BLOOD PRESSURE: 78 MMHG | HEART RATE: 80 BPM | TEMPERATURE: 98.7 F | HEIGHT: 64 IN | WEIGHT: 180 LBS | BODY MASS INDEX: 30.73 KG/M2 | SYSTOLIC BLOOD PRESSURE: 122 MMHG

## 2019-02-20 DIAGNOSIS — Z78.0 POSTMENOPAUSAL: ICD-10-CM

## 2019-02-20 DIAGNOSIS — R03.0 WHITE COAT SYNDROME WITH HIGH BLOOD PRESSURE BUT WITHOUT HYPERTENSION: ICD-10-CM

## 2019-02-20 DIAGNOSIS — E03.9 PRIMARY HYPOTHYROIDISM: Primary | Chronic | ICD-10-CM

## 2019-02-20 DIAGNOSIS — Z13.83 SCREENING FOR CARDIOVASCULAR, RESPIRATORY, AND GENITOURINARY DISEASES: ICD-10-CM

## 2019-02-20 DIAGNOSIS — Z12.39 SCREENING FOR BREAST CANCER: ICD-10-CM

## 2019-02-20 DIAGNOSIS — R63.4 WEIGHT LOSS, ABNORMAL: ICD-10-CM

## 2019-02-20 DIAGNOSIS — Z13.89 SCREENING FOR CARDIOVASCULAR, RESPIRATORY, AND GENITOURINARY DISEASES: ICD-10-CM

## 2019-02-20 DIAGNOSIS — G43.009 ATYPICAL MIGRAINE: Chronic | ICD-10-CM

## 2019-02-20 DIAGNOSIS — Z13.6 SCREENING FOR CARDIOVASCULAR, RESPIRATORY, AND GENITOURINARY DISEASES: ICD-10-CM

## 2019-02-20 PROCEDURE — 99214 OFFICE O/P EST MOD 30 MIN: CPT | Performed by: FAMILY MEDICINE

## 2019-02-20 NOTE — PROGRESS NOTES
Assessment/Plan:    Primary hypothyroidism  Appears to be stable clinically though her weight has decreased  Check TSH  Adjust meds if TSH is not at goal  Recheck 6m    Atypical migraine  CT of the brain last year was unremarkable  Patient states that she can do with the headaches for now  Does not want further workup  Will continue watch for triggers  Recheck 6 months    White coat syndrome with high blood pressure but without hypertension  Blood pressure better today  Will continue to monitor  Weight loss, abnormal  Patient is a smoker though CT of the lungs last fall was negative  Discussed checking CT of the abdomen and pelvis-patient refuses  She is willing to have lab work done but nothing else at this time  Patient fully understands risks of potentially missing a significant issue  Patient will monitor weights at home  She will call if weight continues to decrease  Recheck 6 months       Diagnoses and all orders for this visit:    Primary hypothyroidism  -     TSH, 3rd generation; Future    White coat syndrome with high blood pressure but without hypertension    Atypical migraine    Weight loss, abnormal  -     CBC and differential; Future  -     Comprehensive metabolic panel; Future    Screening for breast cancer  -     Mammo screening bilateral w cad; Future    Postmenopausal  -     DXA bone density spine hip and pelvis; Future    Screening for cardiovascular, respiratory, and genitourinary diseases  -     Lipid panel; Future          Subjective:      Patient ID: Kapil Collins is a 77 y o  female  f/u multiple med issues  - pt states that she is feeling well  Recently had sinus infection but now is better  - pt has been losing weight  Down 12 lbs in the last 6m without trying  Denies worsening fatigue, abd discomfort, change in bowel/bladder, change in appetite, CP or SOB associated with her wt loss  CT of lungs last Sept was unchanged  - pt continues to monitor home Bps   All have been well controlled: 110-120/60-70s  - still has frequent L sided headaches preceded by scotoma  CT of head last Aug was normal  No asymmetric weakness/numbness with episodes  No triggers found  - no CP, palp, lightheadedness with or without exertion  - L knee remains stable s/p injection  - still smoking  The following portions of the patient's history were reviewed and updated as appropriate:   She  has a past medical history of Disease of thyroid gland  She   Patient Active Problem List    Diagnosis Date Noted    Weight loss, abnormal 02/20/2019    Migraine with aura and without status migrainosus, not intractable 08/14/2018    Screening for cervical cancer 06/18/2018    White coat syndrome with high blood pressure but without hypertension 06/18/2018    Atypical migraine 08/29/2017    Paresthesia of left arm 08/29/2017    Primary hypothyroidism 08/29/2017     She  has a past surgical history that includes Cholecystectomy; Foot surgery; and Gallbladder surgery  She  reports that she has been smoking  She has never used smokeless tobacco  She reports that she does not drink alcohol or use drugs  Current Outpatient Medications   Medication Sig Dispense Refill    aspirin 81 mg chewable tablet Chew 1 tablet daily  0    levothyroxine 125 mcg tablet Take 1 tablet (125 mcg total) by mouth daily 90 tablet 3     No current facility-administered medications for this visit  She is allergic to bee venom; naproxen; penicillins; and vicodin  [hydrocodone-acetaminophen]       Review of Systems   Constitutional: Positive for unexpected weight change  HENT: Negative  Eyes: Negative  Respiratory: Negative  Cardiovascular: Negative  Gastrointestinal: Negative  Endocrine: Negative  Genitourinary: Negative  Musculoskeletal: Positive for arthralgias (mild diffuse)  Negative for gait problem and joint swelling  Skin: Negative  Allergic/Immunologic: Negative      Neurological: Positive for headaches  Negative for facial asymmetry, weakness, light-headedness and numbness  Hematological: Negative  Psychiatric/Behavioral: Negative  Objective:      /78   Pulse 80   Temp 98 7 °F (37 1 °C)   Ht 5' 3 75" (1 619 m)   Wt 81 6 kg (180 lb)   BMI 31 14 kg/m²          Physical Exam   Constitutional: She is oriented to person, place, and time  She appears well-developed and well-nourished  HENT:   Head: Normocephalic and atraumatic  Right Ear: External ear normal    Left Ear: External ear normal    Mouth/Throat: Oropharynx is clear and moist  No oropharyngeal exudate  Sl nasal congestion   Eyes: Pupils are equal, round, and reactive to light  Conjunctivae and EOM are normal    Neck: Normal range of motion  Neck supple  No thyromegaly present  Cardiovascular: Normal rate, regular rhythm, normal heart sounds and intact distal pulses  No murmur heard  Pulmonary/Chest: Effort normal and breath sounds normal  She has no wheezes  She has no rales  Abdominal: Soft  She exhibits no distension and no mass  There is no tenderness  Musculoskeletal: She exhibits no edema  L knee without effusion  Improved ROM   Lymphadenopathy:     She has no cervical adenopathy  Neurological: She is alert and oriented to person, place, and time  She has normal reflexes  She displays normal reflexes  No cranial nerve deficit or sensory deficit  She exhibits normal muscle tone  Coordination normal    Skin: Skin is warm and dry  Capillary refill takes less than 2 seconds  She is not diaphoretic  Psychiatric: She has a normal mood and affect  Vitals reviewed

## 2019-02-21 PROBLEM — R03.0 ELEVATED BLOOD PRESSURE READING: Status: RESOLVED | Noted: 2018-06-18 | Resolved: 2019-02-21

## 2019-02-21 PROBLEM — R03.0 ELEVATED BLOOD PRESSURE READING WITHOUT DIAGNOSIS OF HYPERTENSION: Status: RESOLVED | Noted: 2017-08-29 | Resolved: 2019-02-21

## 2019-02-21 NOTE — ASSESSMENT & PLAN NOTE
Patient is a smoker though CT of the lungs last fall was negative  Discussed checking CT of the abdomen and pelvis-patient refuses  She is willing to have lab work done but nothing else at this time  Patient fully understands risks of potentially missing a significant issue  Patient will monitor weights at home  She will call if weight continues to decrease    Recheck 6 months

## 2019-02-21 NOTE — ASSESSMENT & PLAN NOTE
Appears to be stable clinically though her weight has decreased  Check TSH   Adjust meds if TSH is not at goal  Recheck 6m

## 2019-02-21 NOTE — ASSESSMENT & PLAN NOTE
CT of the brain last year was unremarkable  Patient states that she can do with the headaches for now  Does not want further workup  Will continue watch for triggers    Recheck 6 months

## 2019-03-07 ENCOUNTER — OFFICE VISIT (OUTPATIENT)
Dept: FAMILY MEDICINE CLINIC | Facility: CLINIC | Age: 69
End: 2019-03-07
Payer: MEDICARE

## 2019-03-07 VITALS
WEIGHT: 180.8 LBS | DIASTOLIC BLOOD PRESSURE: 92 MMHG | HEIGHT: 64 IN | SYSTOLIC BLOOD PRESSURE: 142 MMHG | TEMPERATURE: 97.2 F | BODY MASS INDEX: 30.87 KG/M2 | HEART RATE: 84 BPM

## 2019-03-07 DIAGNOSIS — M51.36 DDD (DEGENERATIVE DISC DISEASE), LUMBAR: ICD-10-CM

## 2019-03-07 DIAGNOSIS — M25.551 RIGHT HIP PAIN: Primary | ICD-10-CM

## 2019-03-07 PROCEDURE — 96372 THER/PROPH/DIAG INJ SC/IM: CPT | Performed by: FAMILY MEDICINE

## 2019-03-07 PROCEDURE — 99213 OFFICE O/P EST LOW 20 MIN: CPT | Performed by: FAMILY MEDICINE

## 2019-03-07 RX ORDER — PREDNISONE 20 MG/1
TABLET ORAL
Qty: 12 TABLET | Refills: 0 | Status: SHIPPED | OUTPATIENT
Start: 2019-03-07 | End: 2019-10-04

## 2019-03-07 RX ORDER — METHYLPREDNISOLONE ACETATE 80 MG/ML
80 INJECTION, SUSPENSION INTRA-ARTICULAR; INTRALESIONAL; INTRAMUSCULAR; SOFT TISSUE ONCE
Status: COMPLETED | OUTPATIENT
Start: 2019-03-07 | End: 2019-03-07

## 2019-03-07 RX ORDER — TRAMADOL HYDROCHLORIDE 50 MG/1
50 TABLET ORAL EVERY 8 HOURS PRN
Qty: 30 TABLET | Refills: 0 | Status: SHIPPED | OUTPATIENT
Start: 2019-03-07 | End: 2019-11-29 | Stop reason: ALTCHOICE

## 2019-03-07 RX ADMIN — METHYLPREDNISOLONE ACETATE 80 MG: 80 INJECTION, SUSPENSION INTRA-ARTICULAR; INTRALESIONAL; INTRAMUSCULAR; SOFT TISSUE at 16:18

## 2019-03-07 NOTE — PROGRESS NOTES
Assessment/Plan:     Diagnoses and all orders for this visit:    Right hip pain  -     methylPREDNISolone acetate (DEPO-MEDROL) injection 80 mg  -     XR hip/pelv 2-3 vws right if performed; Future  -     predniSONE 20 mg tablet; 2 qd x 4 then 1 qd x 4  -     traMADol (ULTRAM) 50 mg tablet; Take 1 tablet (50 mg total) by mouth every 8 (eight) hours as needed for moderate pain    DDD (degenerative disc disease), lumbar  -     methylPREDNISolone acetate (DEPO-MEDROL) injection 80 mg  -     predniSONE 20 mg tablet; 2 qd x 4 then 1 qd x 4  -     traMADol (ULTRAM) 50 mg tablet; Take 1 tablet (50 mg total) by mouth every 8 (eight) hours as needed for moderate pain        Discussion: I reviewed with pt  Pt with symptoms of DDD and hip issues  REC: D80 IM now  Start pred tomorrow  Refer for hip xray  Recheck Monday if not improved, earlier if worse  Subjective:      Patient ID: Haris Verduzco is a 77 y o  female  52-year-old female with a history of arthritis presents with right groin pain x1 week  Patient states that she woke up with pain  Pain is worse with sitting in certain positions but seems to be better when she is standing  She has a history of degenerative disc disease in her back and is concerned that she has a new disc problem  Patient denies any change in bowel or bladder but feels that the right leg is weak specially with hip flexion  The following portions of the patient's history were reviewed and updated as appropriate:   She  has a past medical history of Disease of thyroid gland    She   Patient Active Problem List    Diagnosis Date Noted    Weight loss, abnormal 02/20/2019    Migraine with aura and without status migrainosus, not intractable 08/14/2018    Screening for cervical cancer 06/18/2018    White coat syndrome with high blood pressure but without hypertension 06/18/2018    Atypical migraine 08/29/2017    Paresthesia of left arm 08/29/2017    Primary hypothyroidism 08/29/2017     She  has a past surgical history that includes Cholecystectomy; Foot surgery; and Gallbladder surgery  She  reports that she has been smoking  She has a 26 50 pack-year smoking history  She has never used smokeless tobacco  She reports that she does not drink alcohol or use drugs  Current Outpatient Medications   Medication Sig Dispense Refill    aspirin 81 mg chewable tablet Chew 1 tablet daily  0    levothyroxine 125 mcg tablet Take 1 tablet (125 mcg total) by mouth daily 90 tablet 3    predniSONE 20 mg tablet 2 qd x 4 then 1 qd x 4 12 tablet 0    traMADol (ULTRAM) 50 mg tablet Take 1 tablet (50 mg total) by mouth every 8 (eight) hours as needed for moderate pain 30 tablet 0     No current facility-administered medications for this visit  She is allergic to bee venom; naproxen; penicillins; spider-man complete multi-vit [flintstones-extra c]; and vicodin  [hydrocodone-acetaminophen]       Review of Systems   Constitutional: Negative  Gastrointestinal: Negative  Genitourinary: Negative  Musculoskeletal: Positive for arthralgias, back pain and gait problem  Negative for joint swelling and myalgias  Skin: Negative  Neurological: Positive for weakness (occasional, associated with pain)  Negative for numbness  Objective:      /92   Pulse 84   Temp (!) 97 2 °F (36 2 °C)   Ht 5' 3 75" (1 619 m)   Wt 82 kg (180 lb 12 8 oz)   BMI 31 28 kg/m²          Physical Exam   Constitutional: She appears well-developed and well-nourished  Cardiovascular: Intact distal pulses  Musculoskeletal:        Right hip: She exhibits decreased range of motion and tenderness  She exhibits no swelling and no deformity  Lumbar back: She exhibits tenderness (sl TTP over the lower lumbar spine  )  She exhibits no swelling and no spasm  Legs:  Neurological: No sensory deficit  Reflex Scores:       Patellar reflexes are 2+ on the right side and 2+ on the left side  Achilles reflexes are 2+ on the right side and 2+ on the left side  Vitals reviewed

## 2019-03-08 ENCOUNTER — HOSPITAL ENCOUNTER (OUTPATIENT)
Dept: RADIOLOGY | Facility: HOSPITAL | Age: 69
Discharge: HOME | End: 2019-03-08
Payer: MEDICARE

## 2019-03-08 DIAGNOSIS — M25.551 RIGHT HIP PAIN: ICD-10-CM

## 2019-03-08 PROCEDURE — 73502 X-RAY EXAM HIP UNI 2-3 VIEWS: CPT

## 2019-03-14 ENCOUNTER — TELEPHONE (OUTPATIENT)
Dept: FAMILY MEDICINE CLINIC | Facility: CLINIC | Age: 69
End: 2019-03-14

## 2019-03-14 NOTE — TELEPHONE ENCOUNTER
Pt is calling for hip xray results done at [facility] [place] on 3/8, which saying still in progress,   She wants to know whats taking so long for results  pl adv

## 2019-03-14 NOTE — TELEPHONE ENCOUNTER
They told patient doctor can call to get verbal at [phone], not yet dictated, call patient as soon as you know, she is in a lot of pain

## 2019-03-15 NOTE — TELEPHONE ENCOUNTER
For pain, given her naproxen allergy, tylenol is the only other med for pain   REC: ortho eval to see if there is anything they can offer

## 2019-03-15 NOTE — TELEPHONE ENCOUNTER
Pt states that the medication did work, but does not want to continue taking it all the time, tramadol makes her "dopey" and pred makes her blood pressure rise  She does work part time so concerned with feeling tired  Wants to know what else she can do or if there is a different medication

## 2019-03-26 ENCOUNTER — APPOINTMENT (OUTPATIENT)
Dept: LAB | Facility: CLINIC | Age: 69
End: 2019-03-26
Payer: MEDICARE

## 2019-03-26 DIAGNOSIS — Z13.6 SCREENING FOR CARDIOVASCULAR, RESPIRATORY, AND GENITOURINARY DISEASES: ICD-10-CM

## 2019-03-26 DIAGNOSIS — E03.9 PRIMARY HYPOTHYROIDISM: Chronic | ICD-10-CM

## 2019-03-26 DIAGNOSIS — R63.4 WEIGHT LOSS, ABNORMAL: ICD-10-CM

## 2019-03-26 DIAGNOSIS — Z13.83 SCREENING FOR CARDIOVASCULAR, RESPIRATORY, AND GENITOURINARY DISEASES: ICD-10-CM

## 2019-03-26 DIAGNOSIS — Z13.89 SCREENING FOR CARDIOVASCULAR, RESPIRATORY, AND GENITOURINARY DISEASES: ICD-10-CM

## 2019-03-26 LAB
ALBUMIN SERPL BCP-MCNC: 3.7 G/DL (ref 3.5–5)
ALP SERPL-CCNC: 80 U/L (ref 46–116)
ALT SERPL W P-5'-P-CCNC: 38 U/L (ref 12–78)
ANION GAP SERPL CALCULATED.3IONS-SCNC: 6 MMOL/L (ref 4–13)
AST SERPL W P-5'-P-CCNC: 22 U/L (ref 5–45)
BASOPHILS # BLD AUTO: 0.12 THOUSANDS/ΜL (ref 0–0.1)
BASOPHILS NFR BLD AUTO: 1 % (ref 0–1)
BILIRUB SERPL-MCNC: 0.53 MG/DL (ref 0.2–1)
BUN SERPL-MCNC: 10 MG/DL (ref 5–25)
CALCIUM SERPL-MCNC: 9.1 MG/DL (ref 8.3–10.1)
CHLORIDE SERPL-SCNC: 106 MMOL/L (ref 100–108)
CHOLEST SERPL-MCNC: 174 MG/DL (ref 50–200)
CO2 SERPL-SCNC: 27 MMOL/L (ref 21–32)
CREAT SERPL-MCNC: 0.87 MG/DL (ref 0.6–1.3)
EOSINOPHIL # BLD AUTO: 0.21 THOUSAND/ΜL (ref 0–0.61)
EOSINOPHIL NFR BLD AUTO: 2 % (ref 0–6)
ERYTHROCYTE [DISTWIDTH] IN BLOOD BY AUTOMATED COUNT: 13.3 % (ref 11.6–15.1)
GFR SERPL CREATININE-BSD FRML MDRD: 69 ML/MIN/1.73SQ M
GLUCOSE P FAST SERPL-MCNC: 92 MG/DL (ref 65–99)
HCT VFR BLD AUTO: 49.7 % (ref 34.8–46.1)
HDLC SERPL-MCNC: 37 MG/DL (ref 40–60)
HGB BLD-MCNC: 16.1 G/DL (ref 11.5–15.4)
IMM GRANULOCYTES # BLD AUTO: 0.1 THOUSAND/UL (ref 0–0.2)
IMM GRANULOCYTES NFR BLD AUTO: 1 % (ref 0–2)
LDLC SERPL CALC-MCNC: 114 MG/DL (ref 0–100)
LYMPHOCYTES # BLD AUTO: 3.06 THOUSANDS/ΜL (ref 0.6–4.47)
LYMPHOCYTES NFR BLD AUTO: 22 % (ref 14–44)
MCH RBC QN AUTO: 33.1 PG (ref 26.8–34.3)
MCHC RBC AUTO-ENTMCNC: 32.4 G/DL (ref 31.4–37.4)
MCV RBC AUTO: 102 FL (ref 82–98)
MONOCYTES # BLD AUTO: 1.27 THOUSAND/ΜL (ref 0.17–1.22)
MONOCYTES NFR BLD AUTO: 9 % (ref 4–12)
NEUTROPHILS # BLD AUTO: 8.96 THOUSANDS/ΜL (ref 1.85–7.62)
NEUTS SEG NFR BLD AUTO: 65 % (ref 43–75)
NONHDLC SERPL-MCNC: 137 MG/DL
NRBC BLD AUTO-RTO: 0 /100 WBCS
PLATELET # BLD AUTO: 237 THOUSANDS/UL (ref 149–390)
PMV BLD AUTO: 12.3 FL (ref 8.9–12.7)
POTASSIUM SERPL-SCNC: 4.6 MMOL/L (ref 3.5–5.3)
PROT SERPL-MCNC: 7.7 G/DL (ref 6.4–8.2)
RBC # BLD AUTO: 4.87 MILLION/UL (ref 3.81–5.12)
SODIUM SERPL-SCNC: 139 MMOL/L (ref 136–145)
TRIGL SERPL-MCNC: 113 MG/DL
TSH SERPL DL<=0.05 MIU/L-ACNC: 2.31 UIU/ML (ref 0.36–3.74)
WBC # BLD AUTO: 13.72 THOUSAND/UL (ref 4.31–10.16)

## 2019-03-26 PROCEDURE — 80061 LIPID PANEL: CPT

## 2019-03-26 PROCEDURE — 85025 COMPLETE CBC W/AUTO DIFF WBC: CPT

## 2019-03-26 PROCEDURE — 80053 COMPREHEN METABOLIC PANEL: CPT

## 2019-03-26 PROCEDURE — 84443 ASSAY THYROID STIM HORMONE: CPT

## 2019-03-26 PROCEDURE — 36415 COLL VENOUS BLD VENIPUNCTURE: CPT

## 2019-10-04 ENCOUNTER — OFFICE VISIT (OUTPATIENT)
Dept: FAMILY MEDICINE CLINIC | Facility: CLINIC | Age: 69
End: 2019-10-04
Payer: MEDICARE

## 2019-10-04 VITALS
BODY MASS INDEX: 32.03 KG/M2 | HEIGHT: 64 IN | HEART RATE: 80 BPM | TEMPERATURE: 98.2 F | DIASTOLIC BLOOD PRESSURE: 82 MMHG | SYSTOLIC BLOOD PRESSURE: 122 MMHG | WEIGHT: 187.6 LBS

## 2019-10-04 DIAGNOSIS — M25.561 ACUTE PAIN OF RIGHT KNEE: Primary | ICD-10-CM

## 2019-10-04 PROCEDURE — 99213 OFFICE O/P EST LOW 20 MIN: CPT | Performed by: FAMILY MEDICINE

## 2019-10-04 PROCEDURE — 20610 DRAIN/INJ JOINT/BURSA W/O US: CPT | Performed by: FAMILY MEDICINE

## 2019-10-04 RX ADMIN — METHYLPREDNISOLONE ACETATE 1 ML: 80 INJECTION, SUSPENSION INTRA-ARTICULAR; INTRALESIONAL; INTRAMUSCULAR; SOFT TISSUE at 15:00

## 2019-10-04 RX ADMIN — LIDOCAINE HYDROCHLORIDE 4 ML: 10 INJECTION, SOLUTION INFILTRATION; PERINEURAL at 15:00

## 2019-10-04 NOTE — PROGRESS NOTES
Assessment/Plan:     Diagnoses and all orders for this visit:    Acute pain of right knee  Comments:  improved s/p injection  Discussed with pt  Consider XR  I reviewed post injection instructions with pt  Recheck 1 weekif not improved - earlier if worse  Orders:  -     Large joint arthrocentesis: R knee          Subjective:      Patient ID: Dayanna Cantor is a 77 y o  female  Worsening R knee pain x 1 week  Started while at work  No trauma  No change with OTC treatments  Pain worse with standing/ambulation  No distal weakness or numbness      The following portions of the patient's history were reviewed and updated as appropriate:   She  has a past medical history of Disease of thyroid gland  She   Patient Active Problem List    Diagnosis Date Noted    Weight loss, abnormal 02/20/2019    Migraine with aura and without status migrainosus, not intractable 08/14/2018    Screening for cervical cancer 06/18/2018    White coat syndrome with high blood pressure but without hypertension 06/18/2018    Atypical migraine 08/29/2017    Paresthesia of left arm 08/29/2017    Primary hypothyroidism 08/29/2017     She  has a past surgical history that includes Cholecystectomy; Foot surgery; and Gallbladder surgery  She  reports that she has been smoking  She has a 26 50 pack-year smoking history  She has never used smokeless tobacco  She reports that she does not drink alcohol or use drugs  Current Outpatient Medications   Medication Sig Dispense Refill    aspirin 81 mg chewable tablet Chew 1 tablet daily  0    levothyroxine 125 mcg tablet Take 1 tablet (125 mcg total) by mouth daily 90 tablet 3    traMADol (ULTRAM) 50 mg tablet Take 1 tablet (50 mg total) by mouth every 8 (eight) hours as needed for moderate pain 30 tablet 0     No current facility-administered medications for this visit        She is allergic to bee venom; naproxen; penicillins; spider-man complete multi-vit [flintstones-extra c]; and vicodin [hydrocodone-acetaminophen]       Review of Systems   Constitutional: Negative  Musculoskeletal: Positive for arthralgias  Negative for joint swelling and myalgias  Skin: Negative  Neurological: Negative for weakness and numbness  Objective:      /82 (BP Location: Left arm, Patient Position: Sitting, Cuff Size: Standard)   Pulse 80   Temp 98 2 °F (36 8 °C)   Ht 5' 3 75" (1 619 m)   Wt 85 1 kg (187 lb 9 6 oz)   BMI 32 45 kg/m²          Physical Exam   Constitutional: She appears well-developed and well-nourished  Cardiovascular: Intact distal pulses  Musculoskeletal:        Right knee: She exhibits effusion (minimal)  She exhibits normal range of motion, no ecchymosis, no erythema, no LCL laxity and no MCL laxity  Tenderness found  Medial joint line tenderness noted  No lateral joint line tenderness noted           Large joint arthrocentesis: R knee  Date/Time: 10/4/2019 3:00 PM  Consent given by: patient  Site marked: site marked  Timeout: Immediately prior to procedure a time out was called to verify the correct patient, procedure, equipment, support staff and site/side marked as required   Supporting Documentation  Indications: pain   Procedure Details  Location: knee - R knee  Preparation: Patient was prepped and draped in the usual sterile fashion  Needle size: 25 G  Ultrasound guidance: no  Approach: medial  Medications administered: 4 mL lidocaine 1 %; 1 mL methylPREDNISolone acetate 80 mg/mL    Aspirate amount: 0 mL    Patient tolerance: patient tolerated the procedure well with no immediate complications  Dressing:  Sterile dressing applied

## 2019-10-07 RX ORDER — METHYLPREDNISOLONE ACETATE 80 MG/ML
1 INJECTION, SUSPENSION INTRA-ARTICULAR; INTRALESIONAL; INTRAMUSCULAR; SOFT TISSUE
Status: COMPLETED | OUTPATIENT
Start: 2019-10-04 | End: 2019-10-04

## 2019-10-07 RX ORDER — LIDOCAINE HYDROCHLORIDE 10 MG/ML
4 INJECTION, SOLUTION INFILTRATION; PERINEURAL
Status: COMPLETED | OUTPATIENT
Start: 2019-10-04 | End: 2019-10-04

## 2019-11-20 DIAGNOSIS — E03.9 HYPOTHYROIDISM, UNSPECIFIED TYPE: ICD-10-CM

## 2019-11-20 RX ORDER — LEVOTHYROXINE SODIUM 0.12 MG/1
TABLET ORAL
Qty: 90 TABLET | Refills: 3 | Status: SHIPPED | OUTPATIENT
Start: 2019-11-20 | End: 2019-11-29 | Stop reason: SDUPTHER

## 2019-11-20 RX ORDER — LEVOTHYROXINE SODIUM 0.12 MG/1
125 TABLET ORAL DAILY
Qty: 90 TABLET | Refills: 3 | Status: SHIPPED | OUTPATIENT
Start: 2019-11-20 | End: 2020-11-10 | Stop reason: SDUPTHER

## 2019-11-29 ENCOUNTER — OFFICE VISIT (OUTPATIENT)
Dept: FAMILY MEDICINE CLINIC | Facility: CLINIC | Age: 69
End: 2019-11-29
Payer: MEDICARE

## 2019-11-29 VITALS
SYSTOLIC BLOOD PRESSURE: 140 MMHG | DIASTOLIC BLOOD PRESSURE: 82 MMHG | HEIGHT: 64 IN | BODY MASS INDEX: 31.76 KG/M2 | HEART RATE: 74 BPM | WEIGHT: 186 LBS | TEMPERATURE: 99 F

## 2019-11-29 DIAGNOSIS — M25.561 ACUTE PAIN OF RIGHT KNEE: Primary | ICD-10-CM

## 2019-11-29 PROCEDURE — 99213 OFFICE O/P EST LOW 20 MIN: CPT | Performed by: FAMILY MEDICINE

## 2019-11-29 PROCEDURE — 96372 THER/PROPH/DIAG INJ SC/IM: CPT | Performed by: FAMILY MEDICINE

## 2019-11-29 RX ORDER — METHYLPREDNISOLONE ACETATE 80 MG/ML
80 INJECTION, SUSPENSION INTRA-ARTICULAR; INTRALESIONAL; INTRAMUSCULAR; SOFT TISSUE ONCE
Status: COMPLETED | OUTPATIENT
Start: 2019-11-29 | End: 2019-11-29

## 2019-11-29 RX ORDER — ACETAMINOPHEN AND CODEINE PHOSPHATE 300; 30 MG/1; MG/1
1 TABLET ORAL EVERY 8 HOURS PRN
Qty: 15 TABLET | Refills: 0 | Status: SHIPPED | OUTPATIENT
Start: 2019-11-29 | End: 2019-12-04

## 2019-11-29 RX ORDER — KETOROLAC TROMETHAMINE 30 MG/ML
60 INJECTION, SOLUTION INTRAMUSCULAR; INTRAVENOUS ONCE
Status: COMPLETED | OUTPATIENT
Start: 2019-11-29 | End: 2019-11-29

## 2019-11-29 RX ORDER — PREDNISONE 10 MG/1
TABLET ORAL
Qty: 26 TABLET | Refills: 0 | Status: SHIPPED | OUTPATIENT
Start: 2019-11-29 | End: 2020-04-27

## 2019-11-29 RX ADMIN — KETOROLAC TROMETHAMINE 60 MG: 30 INJECTION, SOLUTION INTRAMUSCULAR; INTRAVENOUS at 14:57

## 2019-11-29 RX ADMIN — METHYLPREDNISOLONE ACETATE 80 MG: 80 INJECTION, SUSPENSION INTRA-ARTICULAR; INTRALESIONAL; INTRAMUSCULAR; SOFT TISSUE at 14:58

## 2019-11-29 NOTE — PROGRESS NOTES
Tobacco Cessation Counseling: Tobacco cessation counseling and education was provided  The patient is sincerely urged to quit consumption of tobacco  She is not ready to quit tobacco  The numerous health risks of tobacco consumption were discussed  If she decides to quit, there are a number of helpful adjunctive aids, and she can see me to discuss nicotine replacement therapy, chantix, or bupropion anytime in the future  Patient ID: Dayanna Cantor is a 77 y o  female  HPI: 77 y  o female presenting with a 3 day history of right knee pain  She denies any prior history of right knee pain or problems and denies any recent injury  She complains of pain behind knee , on lateral aspect and under kneecap  She denies any radiation of pain into calf or swelling of calf  She has pain with and without weight bearing and denies locking or giving out        SUBJECTIVE    Family History   Problem Relation Age of Onset    Heart disease Mother     Diabetes Mother     Hypertension Mother     Breast cancer Sister     Colon cancer Brother     Heart disease Brother     Diabetes Brother     Hypertension Brother      Social History     Socioeconomic History    Marital status: Single     Spouse name: Not on file    Number of children: 3    Years of education: HIGH SCHOOL GRADUATE     Highest education level: Not on file   Occupational History    Occupation: RETIRED    Social Needs    Financial resource strain: Not on file    Food insecurity:     Worry: Not on file     Inability: Not on file    Transportation needs:     Medical: Not on file     Non-medical: Not on file   Tobacco Use    Smoking status: Current Every Day Smoker     Packs/day: 0 50     Years: 53 00     Pack years: 26 50    Smokeless tobacco: Never Used   Substance and Sexual Activity    Alcohol use: No    Drug use: No    Sexual activity: Not Currently   Lifestyle    Physical activity:     Days per week: Not on file     Minutes per session: Not on file    Stress: Not on file   Relationships    Social connections:     Talks on phone: Not on file     Gets together: Not on file     Attends [employer] service: Not on file     Active member of club or organization: Not on file     Attends meetings of clubs or organizations: Not on file     Relationship status: Not on file    Intimate partner violence:     Fear of current or ex partner: Not on file     Emotionally abused: Not on file     Physically abused: Not on file     Forced sexual activity: Not on file   Other Topics Concern    Not on file   Social History Narrative    Always uses seat belt    Daily coffee consumption    Denied: history of daily cola consumption    Denied: history of daily tea consumption    Exercise: walking    No guns in the home    No living will    No Moravian beliefs    Water intake, adequate      Past Medical History:   Diagnosis Date    Disease of thyroid gland      Past Surgical History:   Procedure Laterality Date    CHOLECYSTECTOMY      FOOT SURGERY      GALLBLADDER SURGERY       Allergies   Allergen Reactions    Bee Venom     Naproxen Hives    Penicillins Hives    Spider-Man Complete Multi-Vit [Flintstones-Extra C]     Vicodin  [Hydrocodone-Acetaminophen] Hives and Itching       Current Outpatient Medications:     aspirin 81 mg chewable tablet, Chew 1 tablet daily, Disp: , Rfl: 0    levothyroxine 125 mcg tablet, Take 1 tablet (125 mcg total) by mouth daily, Disp: 90 tablet, Rfl: 3    acetaminophen-codeine (TYLENOL #3) 300-30 mg per tablet, Take 1 tablet by mouth every 8 (eight) hours as needed for moderate pain for up to 5 days, Disp: 15 tablet, Rfl: 0    predniSONE 10 mg tablet, 3 tabs po bid x2 days, then 2 tabs po bid x2 days, then 1 tab bid x2 days, then 1 daily until done , Disp: 26 tablet, Rfl: 0    Current Facility-Administered Medications:     ketorolac (TORADOL) 60 mg/2 mL IM injection 60 mg, 60 mg, Intramuscular, Once, DO Tj Estevez methylPREDNISolone acetate (DEPO-MEDROL) injection 80 mg, 80 mg, Intramuscular, Once, [employer], DO    Review of Systems  Constitutional:     Denies fever, chills ,fatigue ,weakness ,weight loss, weight gain     ENT: Denies earache ,loss of hearing ,nosebleed, nasal discharge,nasal congestion ,sore throat ,hoarseness  Pulmonary: Denies shortness of breath ,cough  ,dyspnea on exertion, orthopnea  ,PND   Cardiovascular:  Denies bradycardia , tachycardia  ,palpations, lower extremity edema leg, claudication  Breast:  Denies new or changing breast lumps ,nipple discharge ,nipple changes  Abdomen:  Denies abdominal pain , anorexia , indigestion, nausea, vomiting, constipation, diarrhea  Musculoskeletal: Denies myalgias, jjoint stiffness , limb pain, limb swelling + right knee pain and swelling  Gu: denies dysuria, polyuria  Skin: Denies skin rash, skin lesion, skin wound, itching, dry skin  Neuro: Denies headache, numbness, tingling, confusion, loss of consciousness, dizziness, vertigo  Psychiatric: Denies feelings of depression, suicidal ideation, anxiety, sleep disturbances    OBJECTIVE  /82   Pulse 74   Temp 99 °F (37 2 °C)   Ht 5' 3 75" (1 619 m)   Wt 84 4 kg (186 lb)   BMI 32 18 kg/m²   Constitutional:   NAD, well appearing and well nourished      ENT:   Conjunctiva and lids: no injection, edema, or discharge     Pupils and iris: LILLIE bilaterally    External inspection of ears and nose: normal without deformities or discharge  Otoscopic exam: Canals patent without erythema  Nasal mucosa, septum and turbinates: Normal or edema or discharge         Oropharynx:  Moist mucosa, normal tongue and tonsils without lesions  No erythema        Pulmonary:Respiratory effort normal rate and rhythm, no increased work of breathing   Auscultation of lungs:  Clear bilaterally with no adventitious breath sounds       Cardiovascular: regular rate and rhythm, S1 and S2, no murmur, no edema and/or varicosities of LE      Abdomen: Soft and non-distended     Positive bowel sounds      No heptomegaly or splenomegaly      Gu: no suprapubic tenderness or CVA tenderness, no urethral discharge  Lymphatic:  No anterior or posterior cervical lymphadenopathy         Musculoskeletal:  Gait and station: Normal gait      Digits and nails normal without clubbing or cyanosis       Inspection/palpation of joints, bones, and muscles:  + right  tenderness on palpation of infrapatellar area, posterior popliteal and lateral aspect of knee, + swelling,of knee but not calf  Negative Andrews's sign, full active and passive range of motion with pain, negative meniscal sign  Skin: Normal skin turgor and no rashes      Neuro:    Normal reflexes     Psych:   alert and oriented to person, place and time     normal mood and affect       Assessment/Plan:Diagnoses and all orders for this visit:    Acute pain of right knee  -     predniSONE 10 mg tablet; 3 tabs po bid x2 days, then 2 tabs po bid x2 days, then 1 tab bid x2 days, then 1 daily until done  -     acetaminophen-codeine (TYLENOL #3) 300-30 mg per tablet; Take 1 tablet by mouth every 8 (eight) hours as needed for moderate pain for up to 5 days  -     ketorolac (TORADOL) 60 mg/2 mL IM injection 60 mg  -     methylPREDNISolone acetate (DEPO-MEDROL) injection 80 mg        Reviewed with patient plan to treat with above plan  She is to ice and elevate her knee  Patient instructed to call in 72 hours if not feeling better or if symptoms worsen  She may need an xray if no better by Monday

## 2020-04-27 ENCOUNTER — TELEPHONE (OUTPATIENT)
Dept: FAMILY MEDICINE CLINIC | Facility: CLINIC | Age: 70
End: 2020-04-27

## 2020-04-27 ENCOUNTER — OFFICE VISIT (OUTPATIENT)
Dept: FAMILY MEDICINE CLINIC | Facility: CLINIC | Age: 70
End: 2020-04-27
Payer: MEDICARE

## 2020-04-27 VITALS
BODY MASS INDEX: 32.78 KG/M2 | WEIGHT: 192 LBS | TEMPERATURE: 98.7 F | HEART RATE: 76 BPM | HEIGHT: 64 IN | SYSTOLIC BLOOD PRESSURE: 134 MMHG | DIASTOLIC BLOOD PRESSURE: 84 MMHG

## 2020-04-27 DIAGNOSIS — M54.9 UPPER BACK PAIN: Primary | ICD-10-CM

## 2020-04-27 DIAGNOSIS — M54.50 ACUTE MIDLINE LOW BACK PAIN WITHOUT SCIATICA: ICD-10-CM

## 2020-04-27 PROCEDURE — 3079F DIAST BP 80-89 MM HG: CPT | Performed by: FAMILY MEDICINE

## 2020-04-27 PROCEDURE — 99213 OFFICE O/P EST LOW 20 MIN: CPT | Performed by: FAMILY MEDICINE

## 2020-04-27 PROCEDURE — 1160F RVW MEDS BY RX/DR IN RCRD: CPT | Performed by: FAMILY MEDICINE

## 2020-04-27 PROCEDURE — 4004F PT TOBACCO SCREEN RCVD TLK: CPT | Performed by: FAMILY MEDICINE

## 2020-04-27 PROCEDURE — 3008F BODY MASS INDEX DOCD: CPT | Performed by: FAMILY MEDICINE

## 2020-04-27 PROCEDURE — 4040F PNEUMOC VAC/ADMIN/RCVD: CPT | Performed by: FAMILY MEDICINE

## 2020-04-27 PROCEDURE — 96372 THER/PROPH/DIAG INJ SC/IM: CPT | Performed by: FAMILY MEDICINE

## 2020-04-27 PROCEDURE — 3075F SYST BP GE 130 - 139MM HG: CPT | Performed by: FAMILY MEDICINE

## 2020-04-27 RX ORDER — METHOCARBAMOL 500 MG/1
500 TABLET, FILM COATED ORAL 4 TIMES DAILY
Qty: 40 TABLET | Refills: 0 | Status: SHIPPED | OUTPATIENT
Start: 2020-04-27 | End: 2020-07-14 | Stop reason: ALTCHOICE

## 2020-04-27 RX ORDER — METHYLPREDNISOLONE ACETATE 80 MG/ML
80 INJECTION, SUSPENSION INTRA-ARTICULAR; INTRALESIONAL; INTRAMUSCULAR; SOFT TISSUE ONCE
Status: COMPLETED | OUTPATIENT
Start: 2020-04-27 | End: 2020-04-27

## 2020-04-27 RX ORDER — PREDNISONE 20 MG/1
TABLET ORAL
Qty: 12 TABLET | Refills: 0 | Status: SHIPPED | OUTPATIENT
Start: 2020-04-27 | End: 2020-07-14 | Stop reason: ALTCHOICE

## 2020-04-27 RX ADMIN — METHYLPREDNISOLONE ACETATE 80 MG: 80 INJECTION, SUSPENSION INTRA-ARTICULAR; INTRALESIONAL; INTRAMUSCULAR; SOFT TISSUE at 10:59

## 2020-04-28 ENCOUNTER — HOSPITAL ENCOUNTER (OUTPATIENT)
Dept: RADIOLOGY | Facility: HOSPITAL | Age: 70
Discharge: HOME | End: 2020-04-28
Payer: MEDICARE

## 2020-04-28 DIAGNOSIS — M54.9 UPPER BACK PAIN: ICD-10-CM

## 2020-04-28 PROCEDURE — 72072 X-RAY EXAM THORAC SPINE 3VWS: CPT

## 2020-05-01 ENCOUNTER — OFFICE VISIT (OUTPATIENT)
Dept: FAMILY MEDICINE CLINIC | Facility: CLINIC | Age: 70
End: 2020-05-01
Payer: MEDICARE

## 2020-05-01 VITALS
SYSTOLIC BLOOD PRESSURE: 128 MMHG | HEIGHT: 64 IN | BODY MASS INDEX: 29.71 KG/M2 | DIASTOLIC BLOOD PRESSURE: 82 MMHG | WEIGHT: 174 LBS | HEART RATE: 74 BPM | TEMPERATURE: 97.9 F

## 2020-05-01 DIAGNOSIS — E03.9 HYPOTHYROIDISM, UNSPECIFIED TYPE: ICD-10-CM

## 2020-05-01 DIAGNOSIS — Z13.6 SCREENING FOR CARDIOVASCULAR CONDITION: ICD-10-CM

## 2020-05-01 DIAGNOSIS — G43.109 MIGRAINE WITH AURA AND WITHOUT STATUS MIGRAINOSUS, NOT INTRACTABLE: ICD-10-CM

## 2020-05-01 DIAGNOSIS — Z12.2 ENCOUNTER FOR SCREENING FOR LUNG CANCER: ICD-10-CM

## 2020-05-01 DIAGNOSIS — Z00.00 MEDICARE ANNUAL WELLNESS VISIT, SUBSEQUENT: Primary | ICD-10-CM

## 2020-05-01 DIAGNOSIS — E03.9 PRIMARY HYPOTHYROIDISM: Chronic | ICD-10-CM

## 2020-05-01 DIAGNOSIS — Z12.11 SCREENING FOR COLORECTAL CANCER: ICD-10-CM

## 2020-05-01 DIAGNOSIS — Z12.31 ENCOUNTER FOR SCREENING MAMMOGRAM FOR BREAST CANCER: ICD-10-CM

## 2020-05-01 DIAGNOSIS — Z78.0 ASYMPTOMATIC POSTMENOPAUSAL STATE: ICD-10-CM

## 2020-05-01 DIAGNOSIS — M54.9 MID BACK PAIN: ICD-10-CM

## 2020-05-01 DIAGNOSIS — Z12.12 SCREENING FOR COLORECTAL CANCER: ICD-10-CM

## 2020-05-01 PROBLEM — R20.2 PARESTHESIA OF LEFT ARM: Status: RESOLVED | Noted: 2017-08-29 | Resolved: 2020-05-01

## 2020-05-01 PROCEDURE — 4040F PNEUMOC VAC/ADMIN/RCVD: CPT | Performed by: FAMILY MEDICINE

## 2020-05-01 PROCEDURE — 1170F FXNL STATUS ASSESSED: CPT | Performed by: FAMILY MEDICINE

## 2020-05-01 PROCEDURE — 1160F RVW MEDS BY RX/DR IN RCRD: CPT | Performed by: FAMILY MEDICINE

## 2020-05-01 PROCEDURE — 1125F AMNT PAIN NOTED PAIN PRSNT: CPT | Performed by: FAMILY MEDICINE

## 2020-05-01 PROCEDURE — 3074F SYST BP LT 130 MM HG: CPT | Performed by: FAMILY MEDICINE

## 2020-05-01 PROCEDURE — 4004F PT TOBACCO SCREEN RCVD TLK: CPT | Performed by: FAMILY MEDICINE

## 2020-05-01 PROCEDURE — 99214 OFFICE O/P EST MOD 30 MIN: CPT | Performed by: FAMILY MEDICINE

## 2020-05-01 PROCEDURE — G0439 PPPS, SUBSEQ VISIT: HCPCS | Performed by: FAMILY MEDICINE

## 2020-05-01 PROCEDURE — 3079F DIAST BP 80-89 MM HG: CPT | Performed by: FAMILY MEDICINE

## 2020-05-01 PROCEDURE — 3008F BODY MASS INDEX DOCD: CPT | Performed by: FAMILY MEDICINE

## 2020-05-12 ENCOUNTER — HOSPITAL ENCOUNTER (OUTPATIENT)
Dept: RADIOLOGY | Age: 70
Discharge: HOME | End: 2020-05-12
Payer: COMMERCIAL

## 2020-05-12 ENCOUNTER — HOSPITAL ENCOUNTER (OUTPATIENT)
Dept: RADIOLOGY | Age: 70
Discharge: HOME | End: 2020-05-12
Payer: MEDICARE

## 2020-05-12 ENCOUNTER — APPOINTMENT (OUTPATIENT)
Dept: LAB | Age: 70
End: 2020-05-12
Payer: MEDICARE

## 2020-05-12 DIAGNOSIS — Z12.2 ENCOUNTER FOR SCREENING FOR LUNG CANCER: ICD-10-CM

## 2020-05-12 DIAGNOSIS — Z78.0 ASYMPTOMATIC POSTMENOPAUSAL STATE: ICD-10-CM

## 2020-05-12 DIAGNOSIS — E03.9 HYPOTHYROIDISM, UNSPECIFIED TYPE: ICD-10-CM

## 2020-05-12 DIAGNOSIS — Z13.6 SCREENING FOR CARDIOVASCULAR CONDITION: ICD-10-CM

## 2020-05-12 DIAGNOSIS — Z12.11 SCREENING FOR COLORECTAL CANCER: ICD-10-CM

## 2020-05-12 DIAGNOSIS — Z12.12 SCREENING FOR COLORECTAL CANCER: ICD-10-CM

## 2020-05-12 PROCEDURE — 36415 COLL VENOUS BLD VENIPUNCTURE: CPT

## 2020-05-12 PROCEDURE — G0297 LDCT FOR LUNG CA SCREEN: HCPCS

## 2020-05-12 PROCEDURE — 84443 ASSAY THYROID STIM HORMONE: CPT

## 2020-05-12 PROCEDURE — 85027 COMPLETE CBC AUTOMATED: CPT

## 2020-05-12 PROCEDURE — 85007 BL SMEAR W/DIFF WBC COUNT: CPT

## 2020-05-12 PROCEDURE — 80061 LIPID PANEL: CPT

## 2020-05-12 PROCEDURE — 77080 DXA BONE DENSITY AXIAL: CPT

## 2020-05-12 PROCEDURE — 80053 COMPREHEN METABOLIC PANEL: CPT

## 2020-05-13 LAB
ALBUMIN SERPL BCP-MCNC: 3.1 G/DL (ref 3.5–5)
ALP SERPL-CCNC: 72 U/L (ref 46–116)
ALT SERPL W P-5'-P-CCNC: 35 U/L (ref 12–78)
ANION GAP SERPL CALCULATED.3IONS-SCNC: 1 MMOL/L (ref 4–13)
AST SERPL W P-5'-P-CCNC: 15 U/L (ref 5–45)
BASOPHILS # BLD MANUAL: 0.13 THOUSAND/UL (ref 0–0.1)
BASOPHILS NFR MAR MANUAL: 1 % (ref 0–1)
BILIRUB SERPL-MCNC: 0.42 MG/DL (ref 0.2–1)
BUN SERPL-MCNC: 10 MG/DL (ref 5–25)
CALCIUM SERPL-MCNC: 9.1 MG/DL (ref 8.3–10.1)
CHLORIDE SERPL-SCNC: 103 MMOL/L (ref 100–108)
CHOLEST SERPL-MCNC: 140 MG/DL (ref 50–200)
CO2 SERPL-SCNC: 30 MMOL/L (ref 21–32)
CREAT SERPL-MCNC: 0.72 MG/DL (ref 0.6–1.3)
EOSINOPHIL # BLD MANUAL: 0.27 THOUSAND/UL (ref 0–0.4)
EOSINOPHIL NFR BLD MANUAL: 2 % (ref 0–6)
ERYTHROCYTE [DISTWIDTH] IN BLOOD BY AUTOMATED COUNT: 12.6 % (ref 11.6–15.1)
GFR SERPL CREATININE-BSD FRML MDRD: 86 ML/MIN/1.73SQ M
GLUCOSE P FAST SERPL-MCNC: 103 MG/DL (ref 65–99)
HCT VFR BLD AUTO: 45.4 % (ref 34.8–46.1)
HDLC SERPL-MCNC: 30 MG/DL
HGB BLD-MCNC: 15 G/DL (ref 11.5–15.4)
LDLC SERPL CALC-MCNC: 93 MG/DL (ref 0–100)
LYMPHOCYTES # BLD AUTO: 35 % (ref 14–44)
LYMPHOCYTES # BLD AUTO: 4.67 THOUSAND/UL (ref 0.6–4.47)
MCH RBC QN AUTO: 32.8 PG (ref 26.8–34.3)
MCHC RBC AUTO-ENTMCNC: 33 G/DL (ref 31.4–37.4)
MCV RBC AUTO: 99 FL (ref 82–98)
MONOCYTES # BLD AUTO: 1.87 THOUSAND/UL (ref 0–1.22)
MONOCYTES NFR BLD: 14 % (ref 4–12)
NEUTROPHILS # BLD MANUAL: 6.27 THOUSAND/UL (ref 1.85–7.62)
NEUTS BAND NFR BLD MANUAL: 3 % (ref 0–8)
NEUTS SEG NFR BLD AUTO: 44 % (ref 43–75)
NONHDLC SERPL-MCNC: 110 MG/DL
NRBC BLD AUTO-RTO: 0 /100 WBCS
PLATELET # BLD AUTO: 172 THOUSANDS/UL (ref 149–390)
PLATELET BLD QL SMEAR: ADEQUATE
PMV BLD AUTO: 12.7 FL (ref 8.9–12.7)
POTASSIUM SERPL-SCNC: 4.8 MMOL/L (ref 3.5–5.3)
PROT SERPL-MCNC: 7.8 G/DL (ref 6.4–8.2)
RBC # BLD AUTO: 4.57 MILLION/UL (ref 3.81–5.12)
RBC MORPH BLD: NORMAL
SODIUM SERPL-SCNC: 134 MMOL/L (ref 136–145)
TOTAL CELLS COUNTED SPEC: 100
TRIGL SERPL-MCNC: 87 MG/DL
TSH SERPL DL<=0.05 MIU/L-ACNC: 0.95 UIU/ML (ref 0.36–3.74)
VARIANT LYMPHS # BLD AUTO: 1 %
WBC # BLD AUTO: 13.33 THOUSAND/UL (ref 4.31–10.16)

## 2020-07-13 ENCOUNTER — TELEPHONE (OUTPATIENT)
Dept: FAMILY MEDICINE CLINIC | Facility: CLINIC | Age: 70
End: 2020-07-13

## 2020-07-13 NOTE — TELEPHONE ENCOUNTER
Patient called stating she is having a lot of knee pain and now the pain shots up to her tail bone and then to her shoulders  Also patient is urinating every hour even during the night  Patient is asking to be seen by you as soon as possible

## 2020-07-14 ENCOUNTER — OFFICE VISIT (OUTPATIENT)
Dept: FAMILY MEDICINE CLINIC | Facility: CLINIC | Age: 70
End: 2020-07-14
Payer: MEDICARE

## 2020-07-14 VITALS
TEMPERATURE: 99.2 F | HEART RATE: 76 BPM | HEIGHT: 64 IN | BODY MASS INDEX: 27.49 KG/M2 | RESPIRATION RATE: 16 BRPM | WEIGHT: 161 LBS | DIASTOLIC BLOOD PRESSURE: 82 MMHG | SYSTOLIC BLOOD PRESSURE: 126 MMHG

## 2020-07-14 DIAGNOSIS — M25.50 ARTHRALGIA OF MULTIPLE JOINTS: Primary | ICD-10-CM

## 2020-07-14 DIAGNOSIS — R35.0 FREQUENCY OF URINATION: ICD-10-CM

## 2020-07-14 DIAGNOSIS — R63.4 ABNORMAL WEIGHT LOSS: ICD-10-CM

## 2020-07-14 DIAGNOSIS — M25.511 ACUTE PAIN OF BOTH SHOULDERS: ICD-10-CM

## 2020-07-14 DIAGNOSIS — M25.512 ACUTE PAIN OF BOTH SHOULDERS: ICD-10-CM

## 2020-07-14 DIAGNOSIS — R31.29 MICROSCOPIC HEMATURIA: ICD-10-CM

## 2020-07-14 LAB
SL AMB  POCT GLUCOSE, UA: ABNORMAL
SL AMB LEUKOCYTE ESTERASE,UA: ABNORMAL
SL AMB POCT BILIRUBIN,UA: ABNORMAL
SL AMB POCT BLOOD,UA: ABNORMAL
SL AMB POCT CLARITY,UA: CLEAR
SL AMB POCT COLOR,UA: YELLOW
SL AMB POCT KETONES,UA: ABNORMAL
SL AMB POCT NITRITE,UA: ABNORMAL
SL AMB POCT PH,UA: 6
SL AMB POCT SPECIFIC GRAVITY,UA: 1.02
SL AMB POCT URINE PROTEIN: ABNORMAL
SL AMB POCT UROBILINOGEN: 0.2

## 2020-07-14 PROCEDURE — 3008F BODY MASS INDEX DOCD: CPT | Performed by: FAMILY MEDICINE

## 2020-07-14 PROCEDURE — 81002 URINALYSIS NONAUTO W/O SCOPE: CPT | Performed by: FAMILY MEDICINE

## 2020-07-14 PROCEDURE — 1160F RVW MEDS BY RX/DR IN RCRD: CPT | Performed by: FAMILY MEDICINE

## 2020-07-14 PROCEDURE — 4004F PT TOBACCO SCREEN RCVD TLK: CPT | Performed by: FAMILY MEDICINE

## 2020-07-14 PROCEDURE — 99215 OFFICE O/P EST HI 40 MIN: CPT | Performed by: FAMILY MEDICINE

## 2020-07-14 PROCEDURE — 3079F DIAST BP 80-89 MM HG: CPT | Performed by: FAMILY MEDICINE

## 2020-07-14 PROCEDURE — 4040F PNEUMOC VAC/ADMIN/RCVD: CPT | Performed by: FAMILY MEDICINE

## 2020-07-14 PROCEDURE — 3074F SYST BP LT 130 MM HG: CPT | Performed by: FAMILY MEDICINE

## 2020-07-14 RX ORDER — PREDNISONE 20 MG/1
TABLET ORAL
Qty: 18 TABLET | Refills: 0 | Status: SHIPPED | OUTPATIENT
Start: 2020-07-14 | End: 2020-10-20

## 2020-07-14 NOTE — PROGRESS NOTES
Assessment/Plan:    Microscopic hematuria  Given recent weight loss, need to r/o occult malignancy  Will check CT of the abd/pelvis with contrast  Recheck U/A with microscopic eval and reflex culture  Recheck 2-3 weeks    Abnormal weight loss  31lbs since 4/27 without change in appetite  Recent chest CT was unremarkable  Check labs and CT of abd/pelvis  Recheck 2-3 weeks    Acute pain of both shoulders  ? Cause  Pt does have hx of tick bites  Check XR of shoulders  Check labs  Recheck 2-3 weeks    Arthralgia of multiple joints  Pt with hx of tick bites  No gross synovitis noted  Check labs  Recheck 2-3 weeks       Diagnoses and all orders for this visit:    Arthralgia of multiple joints  -     CBC and differential; Future  -     Comprehensive metabolic panel; Future  -     C-reactive protein; Future  -     Lyme Antibody Profile with reflex to WB; Future  -     RF Screen w/ Reflex to Titer; Future  -     SYED Screen w/ Reflex to Titer/Pattern; Future  -     predniSONE 20 mg tablet; 3 tab po qd x 3d then 2 tab po qd x 3d then 1 tab po qd x 3d then stop    Abnormal weight loss  -     Cancel: CT abdomen pelvis w contrast; Future  -     CT abdomen pelvis w contrast; Future    Frequency of urination  -     POCT urine dip    Acute pain of both shoulders  -     XR shoulder 2+ vw right; Future  -     XR shoulder 2+ vw left; Future    Microscopic hematuria  -     Cancel: CT abdomen pelvis w contrast; Future  -     UA w Reflex to Microscopic w Reflex to Culture -Lab Collect  -     CT abdomen pelvis w contrast; Future          Subjective:      Patient ID: Victorino Carrel is a 72 y o  female  Pt with increased low back and bilat shoulder pains over the last 2-3 months  Pt notes decreased ROM of her shoulders and hips  Has chronic knee pains bilat which are unchanged  - pt also notes increased nocturia  Pt has been having issues with incontinence b/c she cannot get to the bathroom quick enough     - pt has lost 30lbs in the last 3m despite no change in appetite or intake  (+) increased fatigue  No N/V or change in BMs  CT of the chest (Lung Cancer Screen) in May was unremarkable/unchanged  Labs in May showed increased WBC but no other findings  The following portions of the patient's history were reviewed and updated as appropriate:   She  has a past medical history of Disease of thyroid gland  She   Patient Active Problem List    Diagnosis Date Noted    Arthralgia of multiple joints 07/15/2020    Acute pain of both shoulders 07/15/2020    Microscopic hematuria 07/15/2020    Abnormal weight loss 02/20/2019    Migraine with aura and without status migrainosus, not intractable 08/14/2018    Screening for cervical cancer 06/18/2018    White coat syndrome with high blood pressure but without hypertension 06/18/2018    Atypical migraine 08/29/2017    Primary hypothyroidism 08/29/2017     She  has a past surgical history that includes Cholecystectomy; Foot surgery; and Gallbladder surgery  She  reports that she has been smoking  She has a 26 50 pack-year smoking history  She has never used smokeless tobacco  She reports that she does not drink alcohol or use drugs  Current Outpatient Medications   Medication Sig Dispense Refill    aspirin 81 mg chewable tablet Chew 1 tablet daily  0    levothyroxine 125 mcg tablet Take 1 tablet (125 mcg total) by mouth daily 90 tablet 3    predniSONE 20 mg tablet 3 tab po qd x 3d then 2 tab po qd x 3d then 1 tab po qd x 3d then stop 18 tablet 0     No current facility-administered medications for this visit  She is allergic to bee venom; naproxen; penicillins; spider-man complete multi-vit [flintstones-extra c]; and vicodin  [hydrocodone-acetaminophen]       Review of Systems   Constitutional: Positive for fatigue and unexpected weight change  HENT: Negative  Eyes: Negative  Respiratory: Negative  Cardiovascular: Negative  Gastrointestinal: Negative      Genitourinary: Positive for urgency (occasional)  Musculoskeletal: Positive for arthralgias, back pain, gait problem and myalgias  Skin: Negative  Neurological: Positive for weakness (secondary to pain?)  Negative for dizziness, light-headedness, numbness and headaches  Hematological: Negative  Psychiatric/Behavioral: Negative  Objective:      /82   Pulse 76   Temp 99 2 °F (37 3 °C)   Resp 16   Ht 5' 3 75" (1 619 m)   Wt 73 kg (161 lb)   BMI 27 85 kg/m²          Physical Exam   Constitutional: She is oriented to person, place, and time  She appears well-developed  HENT:   Head: Normocephalic and atraumatic  Right Ear: External ear normal    Left Ear: External ear normal    Pt masked   Eyes: Pupils are equal, round, and reactive to light  Conjunctivae and EOM are normal    Neck: Normal range of motion  Neck supple  No JVD present  Cardiovascular: Normal rate, regular rhythm, normal heart sounds and intact distal pulses  Pulmonary/Chest: Effort normal and breath sounds normal  She has no wheezes  She has no rales  Abdominal: Soft  Bowel sounds are normal  She exhibits no distension and no mass  There is no tenderness  Musculoskeletal: She exhibits tenderness  She exhibits no edema  Decreased ROM of bilat shoulders in all planes secondary to discomfort  Bilat trochanteric bursa TTP  Proximal extremity musculature NT to palp  No hand/finger synovitis or decreased ROM  Knees with sl TTP along joint lines  No effusion appreciated   Lymphadenopathy:     She has no cervical adenopathy  Neurological: She is alert and oriented to person, place, and time  She displays normal reflexes  No sensory deficit  She exhibits normal muscle tone  Skin: Skin is warm  Capillary refill takes less than 2 seconds  Psychiatric: She has a normal mood and affect   Her behavior is normal  Judgment and thought content normal    PHQ-2 = 0

## 2020-07-15 ENCOUNTER — TELEPHONE (OUTPATIENT)
Dept: FAMILY MEDICINE CLINIC | Facility: CLINIC | Age: 70
End: 2020-07-15

## 2020-07-15 ENCOUNTER — APPOINTMENT (OUTPATIENT)
Dept: RADIOLOGY | Age: 70
End: 2020-07-15
Payer: MEDICARE

## 2020-07-15 ENCOUNTER — APPOINTMENT (OUTPATIENT)
Dept: LAB | Age: 70
End: 2020-07-15
Payer: MEDICARE

## 2020-07-15 DIAGNOSIS — M25.50 ARTHRALGIA OF MULTIPLE JOINTS: ICD-10-CM

## 2020-07-15 DIAGNOSIS — M25.511 ACUTE PAIN OF BOTH SHOULDERS: ICD-10-CM

## 2020-07-15 DIAGNOSIS — M25.512 ACUTE PAIN OF BOTH SHOULDERS: ICD-10-CM

## 2020-07-15 DIAGNOSIS — R79.82 ELEVATED C-REACTIVE PROTEIN (CRP): Primary | ICD-10-CM

## 2020-07-15 DIAGNOSIS — R76.8 ELEVATED RHEUMATOID FACTOR: ICD-10-CM

## 2020-07-15 PROBLEM — R31.29 MICROSCOPIC HEMATURIA: Status: ACTIVE | Noted: 2020-07-15

## 2020-07-15 LAB
ALBUMIN SERPL BCP-MCNC: 3.1 G/DL (ref 3.5–5)
ALP SERPL-CCNC: 76 U/L (ref 46–116)
ALT SERPL W P-5'-P-CCNC: 21 U/L (ref 12–78)
ANION GAP SERPL CALCULATED.3IONS-SCNC: 9 MMOL/L (ref 4–13)
AST SERPL W P-5'-P-CCNC: 17 U/L (ref 5–45)
BACTERIA UR QL AUTO: NORMAL /HPF
BASOPHILS # BLD AUTO: 0.11 THOUSANDS/ΜL (ref 0–0.1)
BASOPHILS NFR BLD AUTO: 1 % (ref 0–1)
BILIRUB SERPL-MCNC: 0.45 MG/DL (ref 0.2–1)
BILIRUB UR QL STRIP: NEGATIVE
BUN SERPL-MCNC: 9 MG/DL (ref 5–25)
CALCIUM SERPL-MCNC: 9.6 MG/DL (ref 8.3–10.1)
CHLORIDE SERPL-SCNC: 104 MMOL/L (ref 100–108)
CLARITY UR: CLEAR
CO2 SERPL-SCNC: 25 MMOL/L (ref 21–32)
COLOR UR: YELLOW
CREAT SERPL-MCNC: 0.6 MG/DL (ref 0.6–1.3)
CRP SERPL QL: 44.5 MG/L
CRYOGLOB RF SER-ACNC: ABNORMAL [IU]/ML
EOSINOPHIL # BLD AUTO: 0.1 THOUSAND/ΜL (ref 0–0.61)
EOSINOPHIL NFR BLD AUTO: 1 % (ref 0–6)
ERYTHROCYTE [DISTWIDTH] IN BLOOD BY AUTOMATED COUNT: 13.6 % (ref 11.6–15.1)
GFR SERPL CREATININE-BSD FRML MDRD: 93 ML/MIN/1.73SQ M
GLUCOSE P FAST SERPL-MCNC: 81 MG/DL (ref 65–99)
GLUCOSE UR STRIP-MCNC: NEGATIVE MG/DL
HCT VFR BLD AUTO: 43.6 % (ref 34.8–46.1)
HGB BLD-MCNC: 14 G/DL (ref 11.5–15.4)
HGB UR QL STRIP.AUTO: ABNORMAL
IMM GRANULOCYTES # BLD AUTO: 0.06 THOUSAND/UL (ref 0–0.2)
IMM GRANULOCYTES NFR BLD AUTO: 1 % (ref 0–2)
KETONES UR STRIP-MCNC: NEGATIVE MG/DL
LEUKOCYTE ESTERASE UR QL STRIP: NEGATIVE
LYMPHOCYTES # BLD AUTO: 1.89 THOUSANDS/ΜL (ref 0.6–4.47)
LYMPHOCYTES NFR BLD AUTO: 20 % (ref 14–44)
MCH RBC QN AUTO: 30.8 PG (ref 26.8–34.3)
MCHC RBC AUTO-ENTMCNC: 32.1 G/DL (ref 31.4–37.4)
MCV RBC AUTO: 96 FL (ref 82–98)
MONOCYTES # BLD AUTO: 1.05 THOUSAND/ΜL (ref 0.17–1.22)
MONOCYTES NFR BLD AUTO: 11 % (ref 4–12)
NEUTROPHILS # BLD AUTO: 6.06 THOUSANDS/ΜL (ref 1.85–7.62)
NEUTS SEG NFR BLD AUTO: 66 % (ref 43–75)
NITRITE UR QL STRIP: NEGATIVE
NON-SQ EPI CELLS URNS QL MICRO: NORMAL /HPF
NRBC BLD AUTO-RTO: 0 /100 WBCS
PH UR STRIP.AUTO: 6 [PH]
PLATELET # BLD AUTO: 323 THOUSANDS/UL (ref 149–390)
PMV BLD AUTO: 11.8 FL (ref 8.9–12.7)
POTASSIUM SERPL-SCNC: 3.6 MMOL/L (ref 3.5–5.3)
PROT SERPL-MCNC: 7.8 G/DL (ref 6.4–8.2)
PROT UR STRIP-MCNC: NEGATIVE MG/DL
RBC # BLD AUTO: 4.55 MILLION/UL (ref 3.81–5.12)
RBC #/AREA URNS AUTO: NORMAL /HPF
RHEUMATOID FACT SER QL LA: POSITIVE
SODIUM SERPL-SCNC: 138 MMOL/L (ref 136–145)
SP GR UR STRIP.AUTO: 1.01 (ref 1–1.03)
UROBILINOGEN UR QL STRIP.AUTO: 0.2 E.U./DL
WBC # BLD AUTO: 9.27 THOUSAND/UL (ref 4.31–10.16)
WBC #/AREA URNS AUTO: NORMAL /HPF

## 2020-07-15 PROCEDURE — 81001 URINALYSIS AUTO W/SCOPE: CPT | Performed by: FAMILY MEDICINE

## 2020-07-15 PROCEDURE — 86618 LYME DISEASE ANTIBODY: CPT

## 2020-07-15 PROCEDURE — 85025 COMPLETE CBC W/AUTO DIFF WBC: CPT

## 2020-07-15 PROCEDURE — 86431 RHEUMATOID FACTOR QUANT: CPT

## 2020-07-15 PROCEDURE — 86140 C-REACTIVE PROTEIN: CPT

## 2020-07-15 PROCEDURE — 86038 ANTINUCLEAR ANTIBODIES: CPT

## 2020-07-15 PROCEDURE — 86430 RHEUMATOID FACTOR TEST QUAL: CPT

## 2020-07-15 PROCEDURE — 80053 COMPREHEN METABOLIC PANEL: CPT

## 2020-07-15 PROCEDURE — 36415 COLL VENOUS BLD VENIPUNCTURE: CPT

## 2020-07-15 PROCEDURE — 73030 X-RAY EXAM OF SHOULDER: CPT

## 2020-07-15 NOTE — ASSESSMENT & PLAN NOTE
Given recent weight loss, need to r/o occult malignancy  Will check CT of the abd/pelvis with contrast  Recheck U/A with microscopic eval and reflex culture   Recheck 2-3 weeks

## 2020-07-15 NOTE — ASSESSMENT & PLAN NOTE
31lbs since 4/27 without change in appetite  Recent chest CT was unremarkable  Check labs and CT of abd/pelvis   Recheck 2-3 weeks

## 2020-07-15 NOTE — TELEPHONE ENCOUNTER
Called scheduling and scheduled CT abd and Pelvis w contrast the appoint is at [facility] at 3:00 pm ,Pt had BW done today ,and will P/U Barium tomorrow and she is aware of the appointment

## 2020-07-16 LAB — B BURGDOR IGG+IGM SER-ACNC: <0.91 ISR (ref 0–0.9)

## 2020-07-17 ENCOUNTER — HOSPITAL ENCOUNTER (OUTPATIENT)
Dept: RADIOLOGY | Age: 70
Discharge: HOME | End: 2020-07-17
Payer: MEDICARE

## 2020-07-17 ENCOUNTER — APPOINTMENT (OUTPATIENT)
Dept: RADIOLOGY | Age: 70
End: 2020-07-17
Payer: MEDICARE

## 2020-07-17 DIAGNOSIS — R93.6 ABNORMAL X-RAY OF HUMERUS: ICD-10-CM

## 2020-07-17 DIAGNOSIS — R31.29 MICROSCOPIC HEMATURIA: ICD-10-CM

## 2020-07-17 DIAGNOSIS — R93.6 ABNORMAL X-RAY OF SHOULDER: ICD-10-CM

## 2020-07-17 DIAGNOSIS — R93.6 ABNORMAL X-RAY OF SHOULDER: Primary | ICD-10-CM

## 2020-07-17 DIAGNOSIS — R63.4 ABNORMAL WEIGHT LOSS: ICD-10-CM

## 2020-07-17 LAB — RYE IGE QN: NEGATIVE

## 2020-07-17 PROCEDURE — 74177 CT ABD & PELVIS W/CONTRAST: CPT

## 2020-07-17 PROCEDURE — 73060 X-RAY EXAM OF HUMERUS: CPT

## 2020-07-17 RX ADMIN — IOHEXOL 85 ML: 350 INJECTION, SOLUTION INTRAVENOUS at 12:19

## 2020-07-23 ENCOUNTER — TELEPHONE (OUTPATIENT)
Dept: OBGYN CLINIC | Facility: HOSPITAL | Age: 70
End: 2020-07-23

## 2020-07-23 NOTE — TELEPHONE ENCOUNTER
She has Medicare, she can probably see me earlier at the [address] [place] [address] office

## 2020-07-23 NOTE — TELEPHONE ENCOUNTER
Spoke with patient and she did not want to come to [place], she is not comfortable driving to [facility]

## 2020-07-23 NOTE — TELEPHONE ENCOUNTER
Patient is scheduled as a New Patient in October with Dr Mahsa Buitrago  Patients PCP's office is calling to ask Dr Mahsa Buitrago if she can see the patient any sooner due to abnormal labs        CB: [phone]

## 2020-08-05 ENCOUNTER — TELEPHONE (OUTPATIENT)
Dept: FAMILY MEDICINE CLINIC | Facility: CLINIC | Age: 70
End: 2020-08-05

## 2020-08-05 NOTE — TELEPHONE ENCOUNTER
Patient called  She said that you called her about her CT Abdomen and that you wanted her to be scoped- she is not sure where  She said that you would have us set up the appt to get her in sooner rather than later  It has been 2 weeks  She's wants to know what is going on w/ this

## 2020-08-06 NOTE — TELEPHONE ENCOUNTER
I thought that we spoke about 2 things  1) because of the sl thickening of her bladder on CT and small amount of RBCs on urine dip, we talked about Urology eval and bladder scope  But the microscopic urine eval did not show RBCs making the urine dip a possible false positive  We should repeat the urine with microscopic in 2-3 weeks - if it shows RBCs then we should go forward with the cystoscopy  2) we spoke about never having a colonoscopy  Is she willing to go?

## 2020-08-10 DIAGNOSIS — R31.9 HEMATURIA, UNSPECIFIED TYPE: Primary | ICD-10-CM

## 2020-08-10 NOTE — TELEPHONE ENCOUNTER
Patient would like to do the urine in 2-3 weeks first if you could please place order, and she said she does not want a colonoscopy but will try the cologuard which was discussed at previous visit, I faxed order and pt aware

## 2020-09-21 DIAGNOSIS — Z12.11 SCREENING FOR COLON CANCER: Primary | ICD-10-CM

## 2020-10-15 ENCOUNTER — PREP FOR PROCEDURE (OUTPATIENT)
Dept: GASTROENTEROLOGY | Facility: CLINIC | Age: 70
End: 2020-10-15

## 2020-10-15 DIAGNOSIS — Z12.11 COLON CANCER SCREENING: Primary | ICD-10-CM

## 2020-10-20 ENCOUNTER — OFFICE VISIT (OUTPATIENT)
Dept: RHEUMATOLOGY | Facility: CLINIC | Age: 70
End: 2020-10-20
Payer: MEDICARE

## 2020-10-20 ENCOUNTER — LAB (OUTPATIENT)
Dept: LAB | Facility: CLINIC | Age: 70
End: 2020-10-20
Payer: MEDICARE

## 2020-10-20 ENCOUNTER — HOSPITAL ENCOUNTER (OUTPATIENT)
Dept: RADIOLOGY | Facility: HOSPITAL | Age: 70
Discharge: HOME | End: 2020-10-20
Payer: MEDICARE

## 2020-10-20 VITALS — TEMPERATURE: 99.4 F | DIASTOLIC BLOOD PRESSURE: 86 MMHG | SYSTOLIC BLOOD PRESSURE: 124 MMHG | HEART RATE: 99 BPM

## 2020-10-20 DIAGNOSIS — Z11.59 ENCOUNTER FOR SCREENING FOR OTHER VIRAL DISEASES: ICD-10-CM

## 2020-10-20 DIAGNOSIS — M35.3 PMR (POLYMYALGIA RHEUMATICA) (HCC): ICD-10-CM

## 2020-10-20 DIAGNOSIS — R76.8 ELEVATED RHEUMATOID FACTOR: ICD-10-CM

## 2020-10-20 DIAGNOSIS — M15.0 PRIMARY GENERALIZED (OSTEO)ARTHRITIS: ICD-10-CM

## 2020-10-20 DIAGNOSIS — R79.82 ELEVATED C-REACTIVE PROTEIN (CRP): ICD-10-CM

## 2020-10-20 DIAGNOSIS — Z92.241 HISTORY OF RECENT STEROID USE: ICD-10-CM

## 2020-10-20 DIAGNOSIS — M35.3 PMR (POLYMYALGIA RHEUMATICA) (HCC): Primary | ICD-10-CM

## 2020-10-20 LAB
BACTERIA UR QL AUTO: NORMAL /HPF
BILIRUB UR QL STRIP: NEGATIVE
CK SERPL-CCNC: 47 U/L (ref 26–192)
CLARITY UR: CLEAR
COLOR UR: ABNORMAL
CRP SERPL QL: 110.8 MG/L
ERYTHROCYTE [SEDIMENTATION RATE] IN BLOOD: 98 MM/HOUR (ref 0–29)
GLUCOSE UR STRIP-MCNC: NEGATIVE MG/DL
HBV CORE AB SER QL: NORMAL
HBV SURFACE AG SER QL: NORMAL
HGB UR QL STRIP.AUTO: ABNORMAL
KETONES UR STRIP-MCNC: NEGATIVE MG/DL
LEUKOCYTE ESTERASE UR QL STRIP: NEGATIVE
NITRITE UR QL STRIP: NEGATIVE
NON-SQ EPI CELLS URNS QL MICRO: NORMAL /HPF
PH UR STRIP.AUTO: 6 [PH]
PROT UR STRIP-MCNC: NEGATIVE MG/DL
RBC #/AREA URNS AUTO: NORMAL /HPF
SP GR UR STRIP.AUTO: 1.01 (ref 1–1.03)
UROBILINOGEN UR QL STRIP.AUTO: 0.2 E.U./DL
WBC #/AREA URNS AUTO: NORMAL /HPF

## 2020-10-20 PROCEDURE — 84165 PROTEIN E-PHORESIS SERUM: CPT

## 2020-10-20 PROCEDURE — 86704 HEP B CORE ANTIBODY TOTAL: CPT

## 2020-10-20 PROCEDURE — 85652 RBC SED RATE AUTOMATED: CPT

## 2020-10-20 PROCEDURE — 87340 HEPATITIS B SURFACE AG IA: CPT

## 2020-10-20 PROCEDURE — 99205 OFFICE O/P NEW HI 60 MIN: CPT | Performed by: INTERNAL MEDICINE

## 2020-10-20 PROCEDURE — 81001 URINALYSIS AUTO W/SCOPE: CPT | Performed by: FAMILY MEDICINE

## 2020-10-20 PROCEDURE — 84165 PROTEIN E-PHORESIS SERUM: CPT | Performed by: PATHOLOGY

## 2020-10-20 PROCEDURE — 86235 NUCLEAR ANTIGEN ANTIBODY: CPT

## 2020-10-20 PROCEDURE — 36415 COLL VENOUS BLD VENIPUNCTURE: CPT

## 2020-10-20 PROCEDURE — 73130 X-RAY EXAM OF HAND: CPT

## 2020-10-20 PROCEDURE — 86140 C-REACTIVE PROTEIN: CPT

## 2020-10-20 PROCEDURE — 86200 CCP ANTIBODY: CPT

## 2020-10-20 PROCEDURE — 82550 ASSAY OF CK (CPK): CPT

## 2020-10-20 RX ORDER — PREDNISONE 20 MG/1
20 TABLET ORAL DAILY
Qty: 30 TABLET | Refills: 0 | Status: SHIPPED | OUTPATIENT
Start: 2020-10-20 | End: 2020-11-30 | Stop reason: SDUPTHER

## 2020-10-21 LAB
ENA SS-A AB SER-ACNC: <0.2 AI (ref 0–0.9)
ENA SS-B AB SER-ACNC: <0.2 AI (ref 0–0.9)

## 2020-10-22 LAB
ALBUMIN SERPL ELPH-MCNC: 3.78 G/DL (ref 3.5–5)
ALBUMIN SERPL ELPH-MCNC: 47.9 % (ref 52–65)
ALPHA1 GLOB SERPL ELPH-MCNC: 0.6 G/DL (ref 0.1–0.4)
ALPHA1 GLOB SERPL ELPH-MCNC: 7.6 % (ref 2.5–5)
ALPHA2 GLOB SERPL ELPH-MCNC: 1.54 G/DL (ref 0.4–1.2)
ALPHA2 GLOB SERPL ELPH-MCNC: 19.5 % (ref 7–13)
BETA GLOB ABNORMAL SERPL ELPH-MCNC: 0.41 G/DL (ref 0.4–0.8)
BETA1 GLOB SERPL ELPH-MCNC: 5.2 % (ref 5–13)
BETA2 GLOB SERPL ELPH-MCNC: 6 % (ref 2–8)
BETA2+GAMMA GLOB SERPL ELPH-MCNC: 0.47 G/DL (ref 0.2–0.5)
CCP IGA+IGG SERPL IA-ACNC: 12 UNITS (ref 0–19)
GAMMA GLOB ABNORMAL SERPL ELPH-MCNC: 1.09 G/DL (ref 0.5–1.6)
GAMMA GLOB SERPL ELPH-MCNC: 13.8 % (ref 12–22)
IGG/ALB SER: 0.92 {RATIO} (ref 1.1–1.8)
PROT PATTERN SERPL ELPH-IMP: ABNORMAL
PROT SERPL-MCNC: 7.9 G/DL (ref 6.4–8.2)

## 2020-11-10 ENCOUNTER — OFFICE VISIT (OUTPATIENT)
Dept: FAMILY MEDICINE CLINIC | Facility: CLINIC | Age: 70
End: 2020-11-10
Payer: MEDICARE

## 2020-11-10 VITALS
SYSTOLIC BLOOD PRESSURE: 128 MMHG | TEMPERATURE: 98.2 F | WEIGHT: 168 LBS | HEART RATE: 80 BPM | BODY MASS INDEX: 28.68 KG/M2 | DIASTOLIC BLOOD PRESSURE: 78 MMHG | HEIGHT: 64 IN

## 2020-11-10 DIAGNOSIS — E03.9 PRIMARY HYPOTHYROIDISM: Primary | Chronic | ICD-10-CM

## 2020-11-10 DIAGNOSIS — G43.009 ATYPICAL MIGRAINE: Chronic | ICD-10-CM

## 2020-11-10 DIAGNOSIS — M35.3 PMR (POLYMYALGIA RHEUMATICA) (HCC): ICD-10-CM

## 2020-11-10 DIAGNOSIS — R93.41 ABNORMAL CT SCAN, BLADDER: ICD-10-CM

## 2020-11-10 DIAGNOSIS — E03.9 HYPOTHYROIDISM, UNSPECIFIED TYPE: ICD-10-CM

## 2020-11-10 DIAGNOSIS — R35.0 URINARY FREQUENCY: ICD-10-CM

## 2020-11-10 PROBLEM — M25.50 ARTHRALGIA OF MULTIPLE JOINTS: Status: RESOLVED | Noted: 2020-07-15 | Resolved: 2020-11-10

## 2020-11-10 PROBLEM — M25.511 ACUTE PAIN OF BOTH SHOULDERS: Status: RESOLVED | Noted: 2020-07-15 | Resolved: 2020-11-10

## 2020-11-10 PROBLEM — G43.109 MIGRAINE WITH AURA AND WITHOUT STATUS MIGRAINOSUS, NOT INTRACTABLE: Status: RESOLVED | Noted: 2018-08-14 | Resolved: 2020-11-10

## 2020-11-10 PROBLEM — R31.29 MICROSCOPIC HEMATURIA: Status: RESOLVED | Noted: 2020-07-15 | Resolved: 2020-11-10

## 2020-11-10 PROBLEM — M25.512 ACUTE PAIN OF BOTH SHOULDERS: Status: RESOLVED | Noted: 2020-07-15 | Resolved: 2020-11-10

## 2020-11-10 PROBLEM — R63.4 ABNORMAL WEIGHT LOSS: Status: RESOLVED | Noted: 2019-02-20 | Resolved: 2020-11-10

## 2020-11-10 PROCEDURE — 99214 OFFICE O/P EST MOD 30 MIN: CPT | Performed by: FAMILY MEDICINE

## 2020-11-10 RX ORDER — LEVOTHYROXINE SODIUM 0.12 MG/1
125 TABLET ORAL DAILY
Qty: 90 TABLET | Refills: 3 | Status: SHIPPED | OUTPATIENT
Start: 2020-11-10 | End: 2021-11-11 | Stop reason: SDUPTHER

## 2020-11-20 ENCOUNTER — TELEPHONE (OUTPATIENT)
Dept: FAMILY MEDICINE CLINIC | Facility: CLINIC | Age: 70
End: 2020-11-20

## 2020-11-20 DIAGNOSIS — Z20.822 EXPOSURE TO COVID-19 VIRUS: Primary | ICD-10-CM

## 2020-11-23 DIAGNOSIS — Z20.822 EXPOSURE TO COVID-19 VIRUS: ICD-10-CM

## 2020-11-23 PROCEDURE — U0003 INFECTIOUS AGENT DETECTION BY NUCLEIC ACID (DNA OR RNA); SEVERE ACUTE RESPIRATORY SYNDROME CORONAVIRUS 2 (SARS-COV-2) (CORONAVIRUS DISEASE [COVID-19]), AMPLIFIED PROBE TECHNIQUE, MAKING USE OF HIGH THROUGHPUT TECHNOLOGIES AS DESCRIBED BY CMS-2020-01-R: HCPCS | Performed by: FAMILY MEDICINE

## 2020-11-24 LAB — SARS-COV-2 RNA SPEC QL NAA+PROBE: NOT DETECTED

## 2020-11-30 ENCOUNTER — TELEPHONE (OUTPATIENT)
Dept: OBGYN CLINIC | Facility: HOSPITAL | Age: 70
End: 2020-11-30

## 2020-11-30 DIAGNOSIS — M35.3 PMR (POLYMYALGIA RHEUMATICA) (HCC): ICD-10-CM

## 2020-11-30 RX ORDER — PREDNISONE 10 MG/1
20 TABLET ORAL DAILY
Qty: 60 TABLET | Refills: 0 | Status: SHIPPED | OUTPATIENT
Start: 2020-11-30 | End: 2021-05-13 | Stop reason: ALTCHOICE

## 2020-12-01 RX ORDER — B-COMPLEX WITH VITAMIN C
1 TABLET ORAL 2 TIMES DAILY WITH MEALS
Qty: 60 TABLET | Refills: 11 | Status: CANCELLED | OUTPATIENT
Start: 2020-12-01

## 2020-12-02 ENCOUNTER — TELEPHONE (OUTPATIENT)
Dept: RHEUMATOLOGY | Facility: CLINIC | Age: 70
End: 2020-12-02

## 2020-12-02 ENCOUNTER — TELEMEDICINE (OUTPATIENT)
Dept: RHEUMATOLOGY | Facility: CLINIC | Age: 70
End: 2020-12-02
Payer: MEDICARE

## 2020-12-02 ENCOUNTER — ANESTHESIA EVENT (OUTPATIENT)
Dept: GASTROENTEROLOGY | Facility: AMBULARY SURGERY CENTER | Age: 70
End: 2020-12-02

## 2020-12-02 DIAGNOSIS — M35.3 PMR (POLYMYALGIA RHEUMATICA) (HCC): Primary | ICD-10-CM

## 2020-12-02 DIAGNOSIS — Z79.52 CURRENT CHRONIC USE OF SYSTEMIC STEROIDS: ICD-10-CM

## 2020-12-02 DIAGNOSIS — M15.0 PRIMARY GENERALIZED (OSTEO)ARTHRITIS: ICD-10-CM

## 2020-12-02 DIAGNOSIS — R76.8 ELEVATED RHEUMATOID FACTOR: ICD-10-CM

## 2020-12-02 PROCEDURE — 99442 PR PHYS/QHP TELEPHONE EVALUATION 11-20 MIN: CPT | Performed by: INTERNAL MEDICINE

## 2020-12-02 RX ORDER — MELATONIN
1000 DAILY
Qty: 90 TABLET | Refills: 3 | Status: SHIPPED | OUTPATIENT
Start: 2020-12-02

## 2020-12-02 RX ORDER — PREDNISONE 1 MG/1
TABLET ORAL
Qty: 270 TABLET | Refills: 1 | Status: SHIPPED | OUTPATIENT
Start: 2020-12-02 | End: 2021-07-07 | Stop reason: SDUPTHER

## 2020-12-03 ENCOUNTER — TELEPHONE (OUTPATIENT)
Dept: GASTROENTEROLOGY | Facility: CLINIC | Age: 70
End: 2020-12-03

## 2020-12-07 ENCOUNTER — TELEPHONE (OUTPATIENT)
Dept: UROLOGY | Facility: CLINIC | Age: 70
End: 2020-12-07

## 2020-12-10 ENCOUNTER — LAB (OUTPATIENT)
Dept: LAB | Facility: CLINIC | Age: 70
End: 2020-12-10
Payer: MEDICARE

## 2020-12-10 ENCOUNTER — TELEPHONE (OUTPATIENT)
Dept: RHEUMATOLOGY | Facility: CLINIC | Age: 70
End: 2020-12-10

## 2020-12-10 ENCOUNTER — CONSULT (OUTPATIENT)
Dept: UROLOGY | Facility: CLINIC | Age: 70
End: 2020-12-10
Payer: MEDICARE

## 2020-12-10 VITALS
DIASTOLIC BLOOD PRESSURE: 80 MMHG | BODY MASS INDEX: 29.29 KG/M2 | HEART RATE: 87 BPM | HEIGHT: 64 IN | SYSTOLIC BLOOD PRESSURE: 146 MMHG

## 2020-12-10 DIAGNOSIS — R35.0 URINARY FREQUENCY: ICD-10-CM

## 2020-12-10 DIAGNOSIS — R93.41 ABNORMAL CT SCAN, BLADDER: ICD-10-CM

## 2020-12-10 DIAGNOSIS — M35.3 PMR (POLYMYALGIA RHEUMATICA) (HCC): ICD-10-CM

## 2020-12-10 LAB
CRP SERPL QL: 27.6 MG/L
ERYTHROCYTE [SEDIMENTATION RATE] IN BLOOD: 37 MM/HOUR (ref 0–29)
POST-VOID RESIDUAL VOLUME, ML POC: 51 ML

## 2020-12-10 PROCEDURE — 51798 US URINE CAPACITY MEASURE: CPT | Performed by: PHYSICIAN ASSISTANT

## 2020-12-10 PROCEDURE — 86140 C-REACTIVE PROTEIN: CPT

## 2020-12-10 PROCEDURE — 85652 RBC SED RATE AUTOMATED: CPT

## 2020-12-10 PROCEDURE — 99203 OFFICE O/P NEW LOW 30 MIN: CPT | Performed by: PHYSICIAN ASSISTANT

## 2020-12-10 PROCEDURE — 36415 COLL VENOUS BLD VENIPUNCTURE: CPT

## 2020-12-16 ENCOUNTER — ANESTHESIA (OUTPATIENT)
Dept: GASTROENTEROLOGY | Facility: AMBULARY SURGERY CENTER | Age: 70
End: 2020-12-16

## 2020-12-16 ENCOUNTER — HOSPITAL ENCOUNTER (OUTPATIENT)
Dept: GASTROENTEROLOGY | Facility: AMBULARY SURGERY CENTER | Age: 70
Setting detail: OUTPATIENT SURGERY
Discharge: HOME | End: 2020-12-16
Attending: INTERNAL MEDICINE
Payer: MEDICARE

## 2020-12-16 VITALS
RESPIRATION RATE: 18 BRPM | OXYGEN SATURATION: 100 % | WEIGHT: 163 LBS | BODY MASS INDEX: 32 KG/M2 | HEART RATE: 76 BPM | TEMPERATURE: 97.1 F | HEIGHT: 60 IN | SYSTOLIC BLOOD PRESSURE: 125 MMHG | DIASTOLIC BLOOD PRESSURE: 58 MMHG

## 2020-12-16 VITALS — HEART RATE: 71 BPM

## 2020-12-16 DIAGNOSIS — Z12.11 COLON CANCER SCREENING: ICD-10-CM

## 2020-12-16 PROCEDURE — 45385 COLONOSCOPY W/LESION REMOVAL: CPT | Performed by: INTERNAL MEDICINE

## 2020-12-16 PROCEDURE — 88305 TISSUE EXAM BY PATHOLOGIST: CPT | Performed by: PATHOLOGY

## 2020-12-16 RX ORDER — PROPOFOL 10 MG/ML
INJECTION, EMULSION INTRAVENOUS CONTINUOUS PRN
Status: DISCONTINUED | OUTPATIENT
Start: 2020-12-16 | End: 2020-12-16

## 2020-12-16 RX ORDER — LIDOCAINE HYDROCHLORIDE 10 MG/ML
INJECTION, SOLUTION EPIDURAL; INFILTRATION; INTRACAUDAL; PERINEURAL AS NEEDED
Status: DISCONTINUED | OUTPATIENT
Start: 2020-12-16 | End: 2020-12-16

## 2020-12-16 RX ORDER — PROPOFOL 10 MG/ML
INJECTION, EMULSION INTRAVENOUS AS NEEDED
Status: DISCONTINUED | OUTPATIENT
Start: 2020-12-16 | End: 2020-12-16

## 2020-12-16 RX ORDER — SODIUM CHLORIDE, SODIUM LACTATE, POTASSIUM CHLORIDE, CALCIUM CHLORIDE 600; 310; 30; 20 MG/100ML; MG/100ML; MG/100ML; MG/100ML
100 INJECTION, SOLUTION INTRAVENOUS CONTINUOUS
Status: DISCONTINUED | OUTPATIENT
Start: 2020-12-16 | End: 2020-12-20 | Stop reason: HOSPADM

## 2020-12-16 RX ADMIN — LIDOCAINE HYDROCHLORIDE 50 MG: 10 INJECTION, SOLUTION EPIDURAL; INFILTRATION; INTRACAUDAL at 08:41

## 2020-12-16 RX ADMIN — PROPOFOL 80 MG: 10 INJECTION, EMULSION INTRAVENOUS at 08:41

## 2020-12-16 RX ADMIN — SODIUM CHLORIDE, SODIUM LACTATE, POTASSIUM CHLORIDE, AND CALCIUM CHLORIDE: .6; .31; .03; .02 INJECTION, SOLUTION INTRAVENOUS at 08:24

## 2020-12-16 RX ADMIN — Medication 40 MG: at 08:53

## 2020-12-16 RX ADMIN — PROPOFOL 100 MCG/KG/MIN: 10 INJECTION, EMULSION INTRAVENOUS at 08:42

## 2020-12-21 ENCOUNTER — TELEPHONE (OUTPATIENT)
Dept: GASTROENTEROLOGY | Facility: CLINIC | Age: 70
End: 2020-12-21

## 2021-04-12 ENCOUNTER — TELEPHONE (OUTPATIENT)
Dept: GASTROENTEROLOGY | Facility: CLINIC | Age: 71
End: 2021-04-12

## 2021-05-13 ENCOUNTER — OFFICE VISIT (OUTPATIENT)
Dept: FAMILY MEDICINE CLINIC | Facility: CLINIC | Age: 71
End: 2021-05-13
Payer: MEDICARE

## 2021-05-13 VITALS
HEIGHT: 60 IN | WEIGHT: 177.4 LBS | BODY MASS INDEX: 34.83 KG/M2 | DIASTOLIC BLOOD PRESSURE: 76 MMHG | TEMPERATURE: 97.1 F | HEART RATE: 82 BPM | SYSTOLIC BLOOD PRESSURE: 122 MMHG | OXYGEN SATURATION: 98 %

## 2021-05-13 DIAGNOSIS — Z78.0 ASYMPTOMATIC POSTMENOPAUSAL STATE: ICD-10-CM

## 2021-05-13 DIAGNOSIS — Z13.6 SCREENING FOR CARDIOVASCULAR CONDITION: ICD-10-CM

## 2021-05-13 DIAGNOSIS — E03.9 PRIMARY HYPOTHYROIDISM: Chronic | ICD-10-CM

## 2021-05-13 DIAGNOSIS — Z00.00 MEDICARE ANNUAL WELLNESS VISIT, SUBSEQUENT: Primary | ICD-10-CM

## 2021-05-13 DIAGNOSIS — Z12.31 ENCOUNTER FOR SCREENING MAMMOGRAM FOR BREAST CANCER: ICD-10-CM

## 2021-05-13 DIAGNOSIS — M35.3 PMR (POLYMYALGIA RHEUMATICA) (HCC): ICD-10-CM

## 2021-05-13 DIAGNOSIS — R03.0 WHITE COAT SYNDROME WITH HIGH BLOOD PRESSURE BUT WITHOUT HYPERTENSION: ICD-10-CM

## 2021-05-13 DIAGNOSIS — Z12.2 ENCOUNTER FOR SCREENING FOR LUNG CANCER: ICD-10-CM

## 2021-05-13 PROCEDURE — G0439 PPPS, SUBSEQ VISIT: HCPCS | Performed by: FAMILY MEDICINE

## 2021-05-13 PROCEDURE — 1123F ACP DISCUSS/DSCN MKR DOCD: CPT | Performed by: FAMILY MEDICINE

## 2021-05-13 PROCEDURE — 99214 OFFICE O/P EST MOD 30 MIN: CPT | Performed by: FAMILY MEDICINE

## 2021-05-13 NOTE — PATIENT INSTRUCTIONS
Medicare Preventive Visit Patient Instructions  Thank you for completing your Welcome to Medicare Visit or Medicare Annual Wellness Visit today  Your next wellness visit will be due in one year (5/14/2022)  The screening/preventive services that you may require over the next 5-10 years are detailed below  Some tests may not apply to you based off risk factors and/or age  Screening tests ordered at today's visit but not completed yet may show as past due  Also, please note that scanned in results may not display below  Preventive Screenings:  Service Recommendations Previous Testing/Comments   Colorectal Cancer Screening  * Colonoscopy    * Fecal Occult Blood Test (FOBT)/Fecal Immunochemical Test (FIT)  * Fecal DNA/Cologuard Test  * Flexible Sigmoidoscopy Age: 54-65 years old   Colonoscopy: every 10 years (may be performed more frequently if at higher risk)  OR  FOBT/FIT: every 1 year  OR  Cologuard: every 3 years  OR  Sigmoidoscopy: every 5 years  Screening may be recommended earlier than age 48 if at higher risk for colorectal cancer  Also, an individualized decision between you and your healthcare provider will decide whether screening between the ages of 74-80 would be appropriate  Colonoscopy: 12/16/2020  FOBT/FIT: Not on file  Cologuard: 08/20/2020  Sigmoidoscopy: Not on file    Screening Current     Breast Cancer Screening Age: 36 years old  Frequency: every 1-2 years  Not required if history of left and right mastectomy Mammogram: 06/28/2018        Cervical Cancer Screening Between the ages of 21-29, pap smear recommended once every 3 years  Between the ages of 33-67, can perform pap smear with HPV co-testing every 5 years     Recommendations may differ for women with a history of total hysterectomy, cervical cancer, or abnormal pap smears in past  Pap Smear: 06/13/2018    Screening Not Indicated   Hepatitis C Screening Once for adults born between 1945 and 1965  More frequently in patients at high risk for Hepatitis C Hep C Antibody: 06/22/2018    Screening Current   Diabetes Screening 1-2 times per year if you're at risk for diabetes or have pre-diabetes Fasting glucose: 81 mg/dL   A1C: 5 4 %    Screening Current   Cholesterol Screening Once every 5 years if you don't have a lipid disorder  May order more often based on risk factors  Lipid panel: 05/12/2020    Screening Current     Other Preventive Screenings Covered by Medicare:  1  Abdominal Aortic Aneurysm (AAA) Screening: covered once if your at risk  You're considered to be at risk if you have a family history of AAA  2  Lung Cancer Screening: covers low dose CT scan once per year if you meet all of the following conditions: (1) Age 50-69; (2) No signs or symptoms of lung cancer; (3) Current smoker or have quit smoking within the last 15 years; (4) You have a tobacco smoking history of at least 30 pack years (packs per day multiplied by number of years you smoked); (5) You get a written order from a healthcare provider  3  Glaucoma Screening: covered annually if you're considered high risk: (1) You have diabetes OR (2) Family history of glaucoma OR (3)  aged 48 and older OR (3)  American aged 72 and older  3  Osteoporosis Screening: covered every 2 years if you meet one of the following conditions: (1) You're estrogen deficient and at risk for osteoporosis based off medical history and other findings; (2) Have a vertebral abnormality; (3) On glucocorticoid therapy for more than 3 months; (4) Have primary hyperparathyroidism; (5) On osteoporosis medications and need to assess response to drug therapy  · Last bone density test (DXA Scan): 05/12/2020   5  HIV Screening: covered annually if you're between the age of 15-65  Also covered annually if you are younger than 13 and older than 72 with risk factors for HIV infection  For pregnant patients, it is covered up to 3 times per pregnancy      Immunizations:  Immunization Recommendations   Influenza Vaccine Annual influenza vaccination during flu season is recommended for all persons aged >= 6 months who do not have contraindications   Pneumococcal Vaccine (Prevnar and Pneumovax)  * Prevnar = PCV13  * Pneumovax = PPSV23   Adults 25-60 years old: 1-3 doses may be recommended based on certain risk factors  Adults 72 years old: Prevnar (PCV13) vaccine recommended followed by Pneumovax (PPSV23) vaccine  If already received PPSV23 since turning 65, then PCV13 recommended at least one year after PPSV23 dose  Hepatitis B Vaccine 3 dose series if at intermediate or high risk (ex: diabetes, end stage renal disease, liver disease)   Tetanus (Td) Vaccine - COST NOT COVERED BY MEDICARE PART B Following completion of primary series, a booster dose should be given every 10 years to maintain immunity against tetanus  Td may also be given as tetanus wound prophylaxis  Tdap Vaccine - COST NOT COVERED BY MEDICARE PART B Recommended at least once for all adults  For pregnant patients, recommended with each pregnancy  Shingles Vaccine (Shingrix) - COST NOT COVERED BY MEDICARE PART B  2 shot series recommended in those aged 48 and above     Health Maintenance Due:      Topic Date Due    MAMMOGRAM  06/28/2019    Lung Cancer Screening  05/12/2021    DXA SCAN  05/12/2023    Colonoscopy Surveillance  12/16/2023    Colorectal Cancer Screening  12/16/2030    Hepatitis C Screening  Completed     Immunizations Due:      Topic Date Due    COVID-19 Vaccine (1) Never done    DTaP,Tdap,and Td Vaccines (1 - Tdap) Never done     Advance Directives   What are advance directives? Advance directives are legal documents that state your wishes and plans for medical care  These plans are made ahead of time in case you lose your ability to make decisions for yourself  Advance directives can apply to any medical decision, such as the treatments you want, and if you want to donate organs     What are the types of advance directives? There are many types of advance directives, and each state has rules about how to use them  You may choose a combination of any of the following:  · Living will: This is a written record of the treatment you want  You can also choose which treatments you do not want, which to limit, and which to stop at a certain time  This includes surgery, medicine, IV fluid, and tube feedings  · Durable power of  for healthcare [facility]): This is a written record that states who you want to make healthcare choices for you when you are unable to make them for yourself  This person, called a proxy, is usually a family member or a friend  You may choose more than 1 proxy  · Do not resuscitate (DNR) order:  A DNR order is used in case your heart stops beating or you stop breathing  It is a request not to have certain forms of treatment, such as CPR  A DNR order may be included in other types of advance directives  · Medical directive: This covers the care that you want if you are in a coma, near death, or unable to make decisions for yourself  You can list the treatments you want for each condition  Treatment may include pain medicine, surgery, blood transfusions, dialysis, IV or tube feedings, and a ventilator (breathing machine)  · Values history: This document has questions about your views, beliefs, and how you feel and think about life  This information can help others choose the care that you would choose  Why are advance directives important? An advance directive helps you control your care  Although spoken wishes may be used, it is better to have your wishes written down  Spoken wishes can be misunderstood, or not followed  Treatments may be given even if you do not want them  An advance directive may make it easier for your family to make difficult choices about your care  Urinary Incontinence   Urinary incontinence (UI)  is when you lose control of your bladder   UI develops because your bladder cannot store or empty urine properly  The 3 most common types of UI are stress incontinence, urge incontinence, or both  Medicines:   · May be given to help strengthen your bladder control  Report any side effects of medication to your healthcare provider  Do pelvic muscle exercises often:  Your pelvic muscles help you stop urinating  Squeeze these muscles tight for 5 seconds, then relax for 5 seconds  Gradually work up to squeezing for 10 seconds  Do 3 sets of 15 repetitions a day, or as directed  This will help strengthen your pelvic muscles and improve bladder control  Train your bladder:  Go to the bathroom at set times, such as every 2 hours, even if you do not feel the urge to go  You can also try to hold your urine when you feel the urge to go  For example, hold your urine for 5 minutes when you feel the urge to go  As that becomes easier, hold your urine for 10 minutes  Self-care:   · Keep a UI record  Write down how often you leak urine and how much you leak  Make a note of what you were doing when you leaked urine  · Drink liquids as directed  You may need to limit the amount of liquid you drink to help control your urine leakage  Do not drink any liquid right before you go to bed  Limit or do not have drinks that contain caffeine or alcohol  · Prevent constipation  Eat a variety of high-fiber foods  Good examples are high-fiber cereals, beans, vegetables, and whole-grain breads  Walking is the best way to trigger your intestines to have a bowel movement  · Exercise regularly and maintain a healthy weight  Weight loss and exercise will decrease pressure on your bladder and help you control your leakage  · Use a catheter as directed  to help empty your bladder  A catheter is a tiny, plastic tube that is put into your bladder to drain your urine  · Go to behavior therapy as directed  Behavior therapy may be used to help you learn to control your urge to urinate      Cigarette Smoking and Your Health   Risks to your health if you smoke:  Nicotine and other chemicals found in tobacco damage every cell in your body  Even if you are a light smoker, you have an increased risk for cancer, heart disease, and lung disease  If you are pregnant or have diabetes, smoking increases your risk for complications  Benefits to your health if you stop smoking:   · You decrease respiratory symptoms such as coughing, wheezing, and shortness of breath  · You reduce your risk for cancers of the lung, mouth, throat, kidney, bladder, pancreas, stomach, and cervix  If you already have cancer, you increase the benefits of chemotherapy  You also reduce your risk for cancer returning or a second cancer from developing  · You reduce your risk for heart disease, blood clots, heart attack, and stroke  · You reduce your risk for lung infections, and diseases such as pneumonia, asthma, chronic bronchitis, and emphysema  · Your circulation improves  More oxygen can be delivered to your body  If you have diabetes, you lower your risk for complications, such as kidney, artery, and eye diseases  You also lower your risk for nerve damage  Nerve damage can lead to amputations, poor vision, and blindness  · You improve your body's ability to heal and to fight infections  For more information and support to stop smoking:   · [employer]  Phone: [phone]  Web Address: www [employer]  Weight Management   Why it is important to manage your weight:  Being overweight increases your risk of health conditions such as heart disease, high blood pressure, type 2 diabetes, and certain types of cancer  It can also increase your risk for osteoarthritis, sleep apnea, and other respiratory problems  Aim for a slow, steady weight loss  Even a small amount of weight loss can lower your risk of health problems    How to lose weight safely:  A safe and healthy way to lose weight is to eat fewer calories and get regular exercise  You can lose up about 1 pound a week by decreasing the number of calories you eat by 500 calories each day  Healthy meal plan for weight management:  A healthy meal plan includes a variety of foods, contains fewer calories, and helps you stay healthy  A healthy meal plan includes the following:  · Eat whole-grain foods more often  A healthy meal plan should contain fiber  Fiber is the part of grains, fruits, and vegetables that is not broken down by your body  Whole-grain foods are healthy and provide extra fiber in your diet  Some examples of whole-grain foods are whole-wheat breads and pastas, oatmeal, brown rice, and bulgur  · Eat a variety of vegetables every day  Include dark, leafy greens such as spinach, kale, lima greens, and mustard greens  Eat yellow and orange vegetables such as carrots, sweet potatoes, and winter squash  · Eat a variety of fruits every day  Choose fresh or canned fruit (canned in its own juice or light syrup) instead of juice  Fruit juice has very little or no fiber  · Eat low-fat dairy foods  Drink fat-free (skim) milk or 1% milk  Eat fat-free yogurt and low-fat cottage cheese  Try low-fat cheeses such as mozzarella and other reduced-fat cheeses  · Choose meat and other protein foods that are low in fat  Choose beans or other legumes such as split peas or lentils  Choose fish, skinless poultry (chicken or turkey), or lean cuts of red meat (beef or pork)  Before you cook meat or poultry, cut off any visible fat  · Use less fat and oil  Try baking foods instead of frying them  Add less fat, such as margarine, sour cream, regular salad dressing and mayonnaise to foods  Eat fewer high-fat foods  Some examples of high-fat foods include french fries, doughnuts, ice cream, and cakes  · Eat fewer sweets  Limit foods and drinks that are high in sugar  This includes candy, cookies, regular soda, and sweetened drinks    Exercise:  Exercise at least 30 minutes per day on most days of the week  Some examples of exercise include walking, biking, dancing, and swimming  You can also fit in more physical activity by taking the stairs instead of the elevator or parking farther away from stores  Ask your healthcare provider about the best exercise plan for you  © Copyright [employer] 2018 Information is for End User's use only and may not be sold, redistributed or otherwise used for commercial purposes  All illustrations and images included in CareNotes® are the copyrighted property of [employer]  or [facility] Preventive Visit Patient Instructions  Thank you for completing your Welcome to Medicare Visit or Medicare Annual Wellness Visit today  Your next wellness visit will be due in one year (5/14/2022)  The screening/preventive services that you may require over the next 5-10 years are detailed below  Some tests may not apply to you based off risk factors and/or age  Screening tests ordered at today's visit but not completed yet may show as past due  Also, please note that scanned in results may not display below  Preventive Screenings:  Service Recommendations Previous Testing/Comments   Colorectal Cancer Screening  * Colonoscopy    * Fecal Occult Blood Test (FOBT)/Fecal Immunochemical Test (FIT)  * Fecal DNA/Cologuard Test  * Flexible Sigmoidoscopy Age: 54-65 years old   Colonoscopy: every 10 years (may be performed more frequently if at higher risk)  OR  FOBT/FIT: every 1 year  OR  Cologuard: every 3 years  OR  Sigmoidoscopy: every 5 years  Screening may be recommended earlier than age 48 if at higher risk for colorectal cancer  Also, an individualized decision between you and your healthcare provider will decide whether screening between the ages of 74-80 would be appropriate   Colonoscopy: 12/16/2020  FOBT/FIT: Not on file  Cologuard: 08/20/2020  Sigmoidoscopy: Not on file    Screening Current     Breast Cancer Screening Age: 40+ years old  Frequency: every 1-2 years  Not required if history of left and right mastectomy Mammogram: 06/28/2018        Cervical Cancer Screening Between the ages of 21-29, pap smear recommended once every 3 years  Between the ages of 33-67, can perform pap smear with HPV co-testing every 5 years  Recommendations may differ for women with a history of total hysterectomy, cervical cancer, or abnormal pap smears in past  Pap Smear: 06/13/2018    Screening Not Indicated   Hepatitis C Screening Once for adults born between 1945 and 1965  More frequently in patients at high risk for Hepatitis C Hep C Antibody: 06/22/2018    Screening Current   Diabetes Screening 1-2 times per year if you're at risk for diabetes or have pre-diabetes Fasting glucose: 81 mg/dL   A1C: 5 4 %    Screening Current   Cholesterol Screening Once every 5 years if you don't have a lipid disorder  May order more often based on risk factors  Lipid panel: 05/12/2020    Screening Current     Other Preventive Screenings Covered by Medicare:  6  Abdominal Aortic Aneurysm (AAA) Screening: covered once if your at risk  You're considered to be at risk if you have a family history of AAA  7  Lung Cancer Screening: covers low dose CT scan once per year if you meet all of the following conditions: (1) Age 50-69; (2) No signs or symptoms of lung cancer; (3) Current smoker or have quit smoking within the last 15 years; (4) You have a tobacco smoking history of at least 30 pack years (packs per day multiplied by number of years you smoked); (5) You get a written order from a healthcare provider  8  Glaucoma Screening: covered annually if you're considered high risk: (1) You have diabetes OR (2) Family history of glaucoma OR (3)  aged 48 and older OR (3)  American aged 72 and older  5   Osteoporosis Screening: covered every 2 years if you meet one of the following conditions: (1) You're estrogen deficient and at risk for osteoporosis based off medical history and other findings; (2) Have a vertebral abnormality; (3) On glucocorticoid therapy for more than 3 months; (4) Have primary hyperparathyroidism; (5) On osteoporosis medications and need to assess response to drug therapy  · Last bone density test (DXA Scan): 05/12/2020  10  HIV Screening: covered annually if you're between the age of 12-76  Also covered annually if you are younger than 13 and older than 72 with risk factors for HIV infection  For pregnant patients, it is covered up to 3 times per pregnancy  Immunizations:  Immunization Recommendations   Influenza Vaccine Annual influenza vaccination during flu season is recommended for all persons aged >= 6 months who do not have contraindications   Pneumococcal Vaccine (Prevnar and Pneumovax)  * Prevnar = PCV13  * Pneumovax = PPSV23   Adults 25-60 years old: 1-3 doses may be recommended based on certain risk factors  Adults 72 years old: Prevnar (PCV13) vaccine recommended followed by Pneumovax (PPSV23) vaccine  If already received PPSV23 since turning 65, then PCV13 recommended at least one year after PPSV23 dose  Hepatitis B Vaccine 3 dose series if at intermediate or high risk (ex: diabetes, end stage renal disease, liver disease)   Tetanus (Td) Vaccine - COST NOT COVERED BY MEDICARE PART B Following completion of primary series, a booster dose should be given every 10 years to maintain immunity against tetanus  Td may also be given as tetanus wound prophylaxis  Tdap Vaccine - COST NOT COVERED BY MEDICARE PART B Recommended at least once for all adults  For pregnant patients, recommended with each pregnancy     Shingles Vaccine (Shingrix) - COST NOT COVERED BY MEDICARE PART B  2 shot series recommended in those aged 48 and above     Health Maintenance Due:      Topic Date Due    MAMMOGRAM  06/28/2019    Lung Cancer Screening  05/12/2021    DXA SCAN  05/12/2023    Colonoscopy Surveillance  12/16/2023    Colorectal Cancer Screening  12/16/2030    Hepatitis C Screening  Completed     Immunizations Due:      Topic Date Due    COVID-19 Vaccine (1) Never done    DTaP,Tdap,and Td Vaccines (1 - Tdap) Never done     Advance Directives   What are advance directives? Advance directives are legal documents that state your wishes and plans for medical care  These plans are made ahead of time in case you lose your ability to make decisions for yourself  Advance directives can apply to any medical decision, such as the treatments you want, and if you want to donate organs  What are the types of advance directives? There are many types of advance directives, and each state has rules about how to use them  You may choose a combination of any of the following:  · Living will: This is a written record of the treatment you want  You can also choose which treatments you do not want, which to limit, and which to stop at a certain time  This includes surgery, medicine, IV fluid, and tube feedings  · Durable power of  for healthcare [facility]): This is a written record that states who you want to make healthcare choices for you when you are unable to make them for yourself  This person, called a proxy, is usually a family member or a friend  You may choose more than 1 proxy  · Do not resuscitate (DNR) order:  A DNR order is used in case your heart stops beating or you stop breathing  It is a request not to have certain forms of treatment, such as CPR  A DNR order may be included in other types of advance directives  · Medical directive: This covers the care that you want if you are in a coma, near death, or unable to make decisions for yourself  You can list the treatments you want for each condition  Treatment may include pain medicine, surgery, blood transfusions, dialysis, IV or tube feedings, and a ventilator (breathing machine)  · Values history:   This document has questions about your views, beliefs, and how you feel and think about life  This information can help others choose the care that you would choose  Why are advance directives important? An advance directive helps you control your care  Although spoken wishes may be used, it is better to have your wishes written down  Spoken wishes can be misunderstood, or not followed  Treatments may be given even if you do not want them  An advance directive may make it easier for your family to make difficult choices about your care  Urinary Incontinence   Urinary incontinence (UI)  is when you lose control of your bladder  UI develops because your bladder cannot store or empty urine properly  The 3 most common types of UI are stress incontinence, urge incontinence, or both  Medicines:   · May be given to help strengthen your bladder control  Report any side effects of medication to your healthcare provider  Do pelvic muscle exercises often:  Your pelvic muscles help you stop urinating  Squeeze these muscles tight for 5 seconds, then relax for 5 seconds  Gradually work up to squeezing for 10 seconds  Do 3 sets of 15 repetitions a day, or as directed  This will help strengthen your pelvic muscles and improve bladder control  Train your bladder:  Go to the bathroom at set times, such as every 2 hours, even if you do not feel the urge to go  You can also try to hold your urine when you feel the urge to go  For example, hold your urine for 5 minutes when you feel the urge to go  As that becomes easier, hold your urine for 10 minutes  Self-care:   · Keep a UI record  Write down how often you leak urine and how much you leak  Make a note of what you were doing when you leaked urine  · Drink liquids as directed  You may need to limit the amount of liquid you drink to help control your urine leakage  Do not drink any liquid right before you go to bed  Limit or do not have drinks that contain caffeine or alcohol  · Prevent constipation  Eat a variety of high-fiber foods   Good examples are high-fiber cereals, beans, vegetables, and whole-grain breads  Walking is the best way to trigger your intestines to have a bowel movement  · Exercise regularly and maintain a healthy weight  Weight loss and exercise will decrease pressure on your bladder and help you control your leakage  · Use a catheter as directed  to help empty your bladder  A catheter is a tiny, plastic tube that is put into your bladder to drain your urine  · Go to behavior therapy as directed  Behavior therapy may be used to help you learn to control your urge to urinate  Cigarette Smoking and Your Health   Risks to your health if you smoke:  Nicotine and other chemicals found in tobacco damage every cell in your body  Even if you are a light smoker, you have an increased risk for cancer, heart disease, and lung disease  If you are pregnant or have diabetes, smoking increases your risk for complications  Benefits to your health if you stop smoking:   · You decrease respiratory symptoms such as coughing, wheezing, and shortness of breath  · You reduce your risk for cancers of the lung, mouth, throat, kidney, bladder, pancreas, stomach, and cervix  If you already have cancer, you increase the benefits of chemotherapy  You also reduce your risk for cancer returning or a second cancer from developing  · You reduce your risk for heart disease, blood clots, heart attack, and stroke  · You reduce your risk for lung infections, and diseases such as pneumonia, asthma, chronic bronchitis, and emphysema  · Your circulation improves  More oxygen can be delivered to your body  If you have diabetes, you lower your risk for complications, such as kidney, artery, and eye diseases  You also lower your risk for nerve damage  Nerve damage can lead to amputations, poor vision, and blindness  · You improve your body's ability to heal and to fight infections  For more information and support to stop smoking:   · [employer]  gov  Phone: 7- 272 - 335-6606  Web Address: www [employer]  Weight Management   Why it is important to manage your weight:  Being overweight increases your risk of health conditions such as heart disease, high blood pressure, type 2 diabetes, and certain types of cancer  It can also increase your risk for osteoarthritis, sleep apnea, and other respiratory problems  Aim for a slow, steady weight loss  Even a small amount of weight loss can lower your risk of health problems  How to lose weight safely:  A safe and healthy way to lose weight is to eat fewer calories and get regular exercise  You can lose up about 1 pound a week by decreasing the number of calories you eat by 500 calories each day  Healthy meal plan for weight management:  A healthy meal plan includes a variety of foods, contains fewer calories, and helps you stay healthy  A healthy meal plan includes the following:  · Eat whole-grain foods more often  A healthy meal plan should contain fiber  Fiber is the part of grains, fruits, and vegetables that is not broken down by your body  Whole-grain foods are healthy and provide extra fiber in your diet  Some examples of whole-grain foods are whole-wheat breads and pastas, oatmeal, brown rice, and bulgur  · Eat a variety of vegetables every day  Include dark, leafy greens such as spinach, kale, lima greens, and mustard greens  Eat yellow and orange vegetables such as carrots, sweet potatoes, and winter squash  · Eat a variety of fruits every day  Choose fresh or canned fruit (canned in its own juice or light syrup) instead of juice  Fruit juice has very little or no fiber  · Eat low-fat dairy foods  Drink fat-free (skim) milk or 1% milk  Eat fat-free yogurt and low-fat cottage cheese  Try low-fat cheeses such as mozzarella and other reduced-fat cheeses  · Choose meat and other protein foods that are low in fat  Choose beans or other legumes such as split peas or lentils   Choose fish, skinless poultry (chicken or turkey), or lean cuts of red meat (beef or pork)  Before you cook meat or poultry, cut off any visible fat  · Use less fat and oil  Try baking foods instead of frying them  Add less fat, such as margarine, sour cream, regular salad dressing and mayonnaise to foods  Eat fewer high-fat foods  Some examples of high-fat foods include french fries, doughnuts, ice cream, and cakes  · Eat fewer sweets  Limit foods and drinks that are high in sugar  This includes candy, cookies, regular soda, and sweetened drinks  Exercise:  Exercise at least 30 minutes per day on most days of the week  Some examples of exercise include walking, biking, dancing, and swimming  You can also fit in more physical activity by taking the stairs instead of the elevator or parking farther away from stores  Ask your healthcare provider about the best exercise plan for you  © Copyright [employer] 2018 Information is for End User's use only and may not be sold, redistributed or otherwise used for commercial purposes  All illustrations and images included in CareNotes® are the copyrighted property of [employer]  or [facility] Preventive Visit Patient Instructions  Thank you for completing your Welcome to Medicare Visit or Medicare Annual Wellness Visit today  Your next wellness visit will be due in one year (5/14/2022)  The screening/preventive services that you may require over the next 5-10 years are detailed below  Some tests may not apply to you based off risk factors and/or age  Screening tests ordered at today's visit but not completed yet may show as past due  Also, please note that scanned in results may not display below    Preventive Screenings:  Service Recommendations Previous Testing/Comments   Colorectal Cancer Screening  * Colonoscopy    * Fecal Occult Blood Test (FOBT)/Fecal Immunochemical Test (FIT)  * Fecal DNA/Cologuard Test  * Flexible Sigmoidoscopy Age: 54-65 years old   Colonoscopy: every 10 years (may be performed more frequently if at higher risk)  OR  FOBT/FIT: every 1 year  OR  Cologuard: every 3 years  OR  Sigmoidoscopy: every 5 years  Screening may be recommended earlier than age 48 if at higher risk for colorectal cancer  Also, an individualized decision between you and your healthcare provider will decide whether screening between the ages of 74-80 would be appropriate  Colonoscopy: 12/16/2020  FOBT/FIT: Not on file  Cologuard: 08/20/2020  Sigmoidoscopy: Not on file    Screening Current     Breast Cancer Screening Age: 36 years old  Frequency: every 1-2 years  Not required if history of left and right mastectomy Mammogram: 06/28/2018        Cervical Cancer Screening Between the ages of 21-29, pap smear recommended once every 3 years  Between the ages of 33-67, can perform pap smear with HPV co-testing every 5 years  Recommendations may differ for women with a history of total hysterectomy, cervical cancer, or abnormal pap smears in past  Pap Smear: 06/13/2018    Screening Not Indicated   Hepatitis C Screening Once for adults born between 1945 and 1965  More frequently in patients at high risk for Hepatitis C Hep C Antibody: 06/22/2018    Screening Current   Diabetes Screening 1-2 times per year if you're at risk for diabetes or have pre-diabetes Fasting glucose: 81 mg/dL   A1C: 5 4 %    Screening Current   Cholesterol Screening Once every 5 years if you don't have a lipid disorder  May order more often based on risk factors  Lipid panel: 05/12/2020    Screening Current     Other Preventive Screenings Covered by Medicare:  11  Abdominal Aortic Aneurysm (AAA) Screening: covered once if your at risk  You're considered to be at risk if you have a family history of AAA    12  Lung Cancer Screening: covers low dose CT scan once per year if you meet all of the following conditions: (1) Age 50-69; (2) No signs or symptoms of lung cancer; (3) Current smoker or have quit smoking within the last 15 years; (4) You have a tobacco smoking history of at least 30 pack years (packs per day multiplied by number of years you smoked); (5) You get a written order from a healthcare provider  15  Glaucoma Screening: covered annually if you're considered high risk: (1) You have diabetes OR (2) Family history of glaucoma OR (3)  aged 48 and older OR (3)  American aged 72 and older  15  Osteoporosis Screening: covered every 2 years if you meet one of the following conditions: (1) You're estrogen deficient and at risk for osteoporosis based off medical history and other findings; (2) Have a vertebral abnormality; (3) On glucocorticoid therapy for more than 3 months; (4) Have primary hyperparathyroidism; (5) On osteoporosis medications and need to assess response to drug therapy  · Last bone density test (DXA Scan): 05/12/2020  15  HIV Screening: covered annually if you're between the age of 12-76  Also covered annually if you are younger than 13 and older than 72 with risk factors for HIV infection  For pregnant patients, it is covered up to 3 times per pregnancy  Immunizations:  Immunization Recommendations   Influenza Vaccine Annual influenza vaccination during flu season is recommended for all persons aged >= 6 months who do not have contraindications   Pneumococcal Vaccine (Prevnar and Pneumovax)  * Prevnar = PCV13  * Pneumovax = PPSV23   Adults 25-60 years old: 1-3 doses may be recommended based on certain risk factors  Adults 72 years old: Prevnar (PCV13) vaccine recommended followed by Pneumovax (PPSV23) vaccine  If already received PPSV23 since turning 65, then PCV13 recommended at least one year after PPSV23 dose     Hepatitis B Vaccine 3 dose series if at intermediate or high risk (ex: diabetes, end stage renal disease, liver disease)   Tetanus (Td) Vaccine - COST NOT COVERED BY MEDICARE PART B Following completion of primary series, a booster dose should be given every 10 years to maintain immunity against tetanus  Td may also be given as tetanus wound prophylaxis  Tdap Vaccine - COST NOT COVERED BY MEDICARE PART B Recommended at least once for all adults  For pregnant patients, recommended with each pregnancy  Shingles Vaccine (Shingrix) - COST NOT COVERED BY MEDICARE PART B  2 shot series recommended in those aged 48 and above     Health Maintenance Due:      Topic Date Due    MAMMOGRAM  06/28/2019    Lung Cancer Screening  05/12/2021    DXA SCAN  05/12/2023    Colonoscopy Surveillance  12/16/2023    Colorectal Cancer Screening  12/16/2030    Hepatitis C Screening  Completed     Immunizations Due:      Topic Date Due    COVID-19 Vaccine (1) Never done    DTaP,Tdap,and Td Vaccines (1 - Tdap) Never done     Advance Directives   What are advance directives? Advance directives are legal documents that state your wishes and plans for medical care  These plans are made ahead of time in case you lose your ability to make decisions for yourself  Advance directives can apply to any medical decision, such as the treatments you want, and if you want to donate organs  What are the types of advance directives? There are many types of advance directives, and each state has rules about how to use them  You may choose a combination of any of the following:  · Living will: This is a written record of the treatment you want  You can also choose which treatments you do not want, which to limit, and which to stop at a certain time  This includes surgery, medicine, IV fluid, and tube feedings  · Durable power of  for healthcare [facility]): This is a written record that states who you want to make healthcare choices for you when you are unable to make them for yourself  This person, called a proxy, is usually a family member or a friend  You may choose more than 1 proxy  · Do not resuscitate (DNR) order:  A DNR order is used in case your heart stops beating or you stop breathing   It is a request not to have certain forms of treatment, such as CPR  A DNR order may be included in other types of advance directives  · Medical directive: This covers the care that you want if you are in a coma, near death, or unable to make decisions for yourself  You can list the treatments you want for each condition  Treatment may include pain medicine, surgery, blood transfusions, dialysis, IV or tube feedings, and a ventilator (breathing machine)  · Values history: This document has questions about your views, beliefs, and how you feel and think about life  This information can help others choose the care that you would choose  Why are advance directives important? An advance directive helps you control your care  Although spoken wishes may be used, it is better to have your wishes written down  Spoken wishes can be misunderstood, or not followed  Treatments may be given even if you do not want them  An advance directive may make it easier for your family to make difficult choices about your care  Urinary Incontinence   Urinary incontinence (UI)  is when you lose control of your bladder  UI develops because your bladder cannot store or empty urine properly  The 3 most common types of UI are stress incontinence, urge incontinence, or both  Medicines:   · May be given to help strengthen your bladder control  Report any side effects of medication to your healthcare provider  Do pelvic muscle exercises often:  Your pelvic muscles help you stop urinating  Squeeze these muscles tight for 5 seconds, then relax for 5 seconds  Gradually work up to squeezing for 10 seconds  Do 3 sets of 15 repetitions a day, or as directed  This will help strengthen your pelvic muscles and improve bladder control  Train your bladder:  Go to the bathroom at set times, such as every 2 hours, even if you do not feel the urge to go  You can also try to hold your urine when you feel the urge to go   For example, hold your urine for 5 minutes when you feel the urge to go  As that becomes easier, hold your urine for 10 minutes  Self-care:   · Keep a UI record  Write down how often you leak urine and how much you leak  Make a note of what you were doing when you leaked urine  · Drink liquids as directed  You may need to limit the amount of liquid you drink to help control your urine leakage  Do not drink any liquid right before you go to bed  Limit or do not have drinks that contain caffeine or alcohol  · Prevent constipation  Eat a variety of high-fiber foods  Good examples are high-fiber cereals, beans, vegetables, and whole-grain breads  Walking is the best way to trigger your intestines to have a bowel movement  · Exercise regularly and maintain a healthy weight  Weight loss and exercise will decrease pressure on your bladder and help you control your leakage  · Use a catheter as directed  to help empty your bladder  A catheter is a tiny, plastic tube that is put into your bladder to drain your urine  · Go to behavior therapy as directed  Behavior therapy may be used to help you learn to control your urge to urinate  Cigarette Smoking and Your Health   Risks to your health if you smoke:  Nicotine and other chemicals found in tobacco damage every cell in your body  Even if you are a light smoker, you have an increased risk for cancer, heart disease, and lung disease  If you are pregnant or have diabetes, smoking increases your risk for complications  Benefits to your health if you stop smoking:   · You decrease respiratory symptoms such as coughing, wheezing, and shortness of breath  · You reduce your risk for cancers of the lung, mouth, throat, kidney, bladder, pancreas, stomach, and cervix  If you already have cancer, you increase the benefits of chemotherapy  You also reduce your risk for cancer returning or a second cancer from developing  · You reduce your risk for heart disease, blood clots, heart attack, and stroke  · You reduce your risk for lung infections, and diseases such as pneumonia, asthma, chronic bronchitis, and emphysema  · Your circulation improves  More oxygen can be delivered to your body  If you have diabetes, you lower your risk for complications, such as kidney, artery, and eye diseases  You also lower your risk for nerve damage  Nerve damage can lead to amputations, poor vision, and blindness  · You improve your body's ability to heal and to fight infections  For more information and support to stop smoking:   · [employer]  Phone: 7- 734 - 092-2770  Web Address: [employer]  Weight Management   Why it is important to manage your weight:  Being overweight increases your risk of health conditions such as heart disease, high blood pressure, type 2 diabetes, and certain types of cancer  It can also increase your risk for osteoarthritis, sleep apnea, and other respiratory problems  Aim for a slow, steady weight loss  Even a small amount of weight loss can lower your risk of health problems  How to lose weight safely:  A safe and healthy way to lose weight is to eat fewer calories and get regular exercise  You can lose up about 1 pound a week by decreasing the number of calories you eat by 500 calories each day  Healthy meal plan for weight management:  A healthy meal plan includes a variety of foods, contains fewer calories, and helps you stay healthy  A healthy meal plan includes the following:  · Eat whole-grain foods more often  A healthy meal plan should contain fiber  Fiber is the part of grains, fruits, and vegetables that is not broken down by your body  Whole-grain foods are healthy and provide extra fiber in your diet  Some examples of whole-grain foods are whole-wheat breads and pastas, oatmeal, brown rice, and bulgur  · Eat a variety of vegetables every day  Include dark, leafy greens such as spinach, kale, lima greens, and mustard greens   Eat yellow and orange vegetables such as carrots, sweet potatoes, and winter squash  · Eat a variety of fruits every day  Choose fresh or canned fruit (canned in its own juice or light syrup) instead of juice  Fruit juice has very little or no fiber  · Eat low-fat dairy foods  Drink fat-free (skim) milk or 1% milk  Eat fat-free yogurt and low-fat cottage cheese  Try low-fat cheeses such as mozzarella and other reduced-fat cheeses  · Choose meat and other protein foods that are low in fat  Choose beans or other legumes such as split peas or lentils  Choose fish, skinless poultry (chicken or turkey), or lean cuts of red meat (beef or pork)  Before you cook meat or poultry, cut off any visible fat  · Use less fat and oil  Try baking foods instead of frying them  Add less fat, such as margarine, sour cream, regular salad dressing and mayonnaise to foods  Eat fewer high-fat foods  Some examples of high-fat foods include french fries, doughnuts, ice cream, and cakes  · Eat fewer sweets  Limit foods and drinks that are high in sugar  This includes candy, cookies, regular soda, and sweetened drinks  Exercise:  Exercise at least 30 minutes per day on most days of the week  Some examples of exercise include walking, biking, dancing, and swimming  You can also fit in more physical activity by taking the stairs instead of the elevator or parking farther away from stores  Ask your healthcare provider about the best exercise plan for you  © Copyright [address] 2018 Information is for End User's use only and may not be sold, redistributed or otherwise used for commercial purposes   All illustrations and images included in CareNotes® are the copyrighted property of [employer]  or [address] [place] [address] [facility]

## 2021-05-13 NOTE — PROGRESS NOTES
Assessment/Plan:    Primary hypothyroidism  Appears to be stable clinically  Check TSH  Adjust meds if TSH is not at goal  Recheck 6m      White coat syndrome with high blood pressure but without hypertension   Blood pressure good control today  Continue to monitor  Monitor labs  Recheck 6 months    PMR (polymyalgia rheumatica) (HCC)   Doing much better on present course of prednisone  No proximal muscular tenderness on exam   Follow-up with rheumatology  Continue present treatment  Recheck 6 months       Diagnoses and all orders for this visit:    Medicare annual wellness visit, subsequent    Primary hypothyroidism  -     TSH, 3rd generation; Future    PMR (polymyalgia rheumatica) (HCC)  -     CBC and differential; Future    White coat syndrome with high blood pressure but without hypertension    Encounter for screening mammogram for breast cancer  -     Mammo screening bilateral w 3d & cad; Future    Encounter for screening for lung cancer  -     CT lung screening program; Future    Asymptomatic postmenopausal state  -     DXA bone density spine hip and pelvis; Future    Screening for cardiovascular condition  -     Comprehensive metabolic panel; Future  -     Lipid panel; Future          Subjective:      Patient ID: Herman Olmstead is a 80 y o  female  f/u multiple med issues and AWV  - pt feels well  Had both Moderna vaccines  - up to date with rheum re: PMR  Presently taking prednisone 15mg qd  Finds that her weight has gone up  "I am always hungry"  - pt continues to monitor home BGs and Bps -both have been stable/controlled  - pt does not exercise but is active  Pt deneis CP, palp, lightheadedness with or without exertion  - no new GI issues  Colonoscopy last Dec was normal    - urinary frequency improved  Have some stress incontinence at times  - pt states that she is still smoking 1/2ppd   Due for repeat CT of the chest  - overdue for mammo and dexa  - AWV done      The following portions of the patient's history were reviewed and updated as appropriate:   She  has a past medical history of Disease of thyroid gland and Polymyalgia rheumatica ([facility] [address] )  She   Patient Active Problem List    Diagnosis Date Noted    Adult BMI 31 0-31 9 kg/sq m 12/16/2020    PMR (polymyalgia rheumatica) ([facility] [address] ) 11/10/2020    Urinary frequency 11/10/2020    Screening for cervical cancer 06/18/2018    White coat syndrome with high blood pressure but without hypertension 06/18/2018    Atypical migraine 08/29/2017    Primary hypothyroidism 08/29/2017     She  has a past surgical history that includes Cholecystectomy; Foot surgery; Gallbladder surgery; and Shoulder arthroscopy (Right)  She  reports that she has been smoking  She has a 26 50 pack-year smoking history  She has never used smokeless tobacco  She reports that she does not drink alcohol or use drugs  Current Outpatient Medications   Medication Sig Dispense Refill    cholecalciferol (VITAMIN D3) 1,000 units tablet Take 1 tablet (1,000 Units total) by mouth daily 90 tablet 3    levothyroxine 125 mcg tablet Take 1 tablet (125 mcg total) by mouth daily 90 tablet 3    predniSONE 5 mg tablet Take 3 tabs once daily (total 15 mg once daily) till follow up visit  270 tablet 1     No current facility-administered medications for this visit  She is allergic to bee venom; naproxen; penicillins; and vicodin  [hydrocodone-acetaminophen]       Review of Systems   Constitutional: Negative  HENT: Negative  Eyes: Negative  Respiratory: Negative  Cardiovascular: Negative  Gastrointestinal: Negative  Endocrine: Negative  Genitourinary: Negative  Musculoskeletal: Negative for myalgias (Greatly improved on present treatment)  Skin: Negative  Allergic/Immunologic: Negative  Neurological: Negative  Hematological: Negative  Psychiatric/Behavioral: Negative            Objective:      /76   Pulse 82   Temp (!) 97 1 °F (36 2 °C)   Ht 5' (1 524 m)   Wt 80 5 kg (177 lb 6 4 oz)   SpO2 98%   BMI 34 65 kg/m²          Physical Exam  Vitals signs reviewed  Constitutional:       Appearance: She is well-developed  She is not diaphoretic  HENT:      Head: Normocephalic and atraumatic  Right Ear: Tympanic membrane, ear canal and external ear normal       Left Ear: Tympanic membrane, ear canal and external ear normal    Eyes:      Extraocular Movements: Extraocular movements intact  Conjunctiva/sclera: Conjunctivae normal       Pupils: Pupils are equal, round, and reactive to light  Neck:      Musculoskeletal: Normal range of motion and neck supple  No muscular tenderness  Thyroid: No thyromegaly  Vascular: No JVD  Cardiovascular:      Rate and Rhythm: Normal rate and regular rhythm  Pulses: Normal pulses  Heart sounds: No murmur  Pulmonary:      Effort: Pulmonary effort is normal       Breath sounds: Normal breath sounds  Abdominal:      General: There is no distension  Palpations: Abdomen is soft  There is no mass  Tenderness: There is no abdominal tenderness  Musculoskeletal: Normal range of motion  General: No swelling or tenderness  Right lower leg: No edema  Left lower leg: No edema  Lymphadenopathy:      Cervical: No cervical adenopathy  Skin:     General: Skin is warm and dry  Capillary Refill: Capillary refill takes less than 2 seconds  Neurological:      Mental Status: She is alert and oriented to person, place, and time  Cranial Nerves: No cranial nerve deficit  Sensory: No sensory deficit  Motor: No weakness or abnormal muscle tone  Deep Tendon Reflexes: Reflexes are normal and symmetric  Comments: Mini cog 4/5   Psychiatric:         Mood and Affect: Mood normal          Behavior: Behavior normal          Thought Content:  Thought content normal          Judgment: Judgment normal       Comments: PHQ-9 Depression Screening    PHQ-9: Frequency of the following problems over the past two weeks:      Little interest or pleasure in doing things: 0 - not at all  Feeling down, depressed, or hopeless: 0 - not at all  PHQ-2 Score: 0

## 2021-05-13 NOTE — PROGRESS NOTES
Assessment and Plan:     Problem List Items Addressed This Visit        Endocrine    Primary hypothyroidism (Chronic)     Appears to be stable clinically  Check TSH  Adjust meds if TSH is not at goal  Recheck 6m           Relevant Orders    TSH, 3rd generation       Cardiovascular and Mediastinum    White coat syndrome with high blood pressure but without hypertension      Blood pressure good control today  Continue to monitor  Monitor labs  Recheck 6 months            Other    PMR (polymyalgia rheumatica) (HCC)      Doing much better on present course of prednisone  No proximal muscular tenderness on exam   Follow-up with rheumatology  Continue present treatment  Recheck 6 months         Relevant Orders    CBC and differential      Other Visit Diagnoses     Medicare annual wellness visit, subsequent    -  Primary    Encounter for screening mammogram for breast cancer        Relevant Orders    Mammo screening bilateral w 3d & cad    Encounter for screening for lung cancer        Relevant Orders    CT lung screening program    Asymptomatic postmenopausal state        Relevant Orders    DXA bone density spine hip and pelvis    Screening for cardiovascular condition        Relevant Orders    Comprehensive metabolic panel    Lipid panel           Preventive health issues were discussed with patient, and age appropriate screening tests were ordered as noted in patient's After Visit Summary  Personalized health advice and appropriate referrals for health education or preventive services given if needed, as noted in patient's After Visit Summary       History of Present Illness:     Patient presents for Medicare Annual Wellness visit    Patient Care Team:  Kenzie Briceno MD as PCP - General     Problem List:     Patient Active Problem List   Diagnosis    Atypical migraine    Primary hypothyroidism    Screening for cervical cancer    White coat syndrome with high blood pressure but without hypertension  PMR (polymyalgia rheumatica) (HCC)    Urinary frequency    Adult BMI 31 0-31 9 kg/sq m      Past Medical and Surgical History:     Past Medical History:   Diagnosis Date    Disease of thyroid gland     Polymyalgia rheumatica (HCC)      Past Surgical History:   Procedure Laterality Date    CHOLECYSTECTOMY      FOOT SURGERY      GALLBLADDER SURGERY      SHOULDER ARTHROSCOPY Right       Family History:     Family History   Problem Relation Age of Onset    Heart disease Mother     Diabetes Mother     Hypertension Mother     Breast cancer Sister     Colon cancer Brother     Heart disease Brother     Diabetes Brother     Hypertension Brother       Social History:        Social History     Socioeconomic History    Marital status: Single     Spouse name: None    Number of children: 4    Years of education: HIGH SCHOOL GRADUATE     Highest education level: None   Occupational History    Occupation: RETIRED    Social Needs    Financial resource strain: None    Food insecurity     Worry: None     Inability: None    Transportation needs     Medical: None     Non-medical: None   Tobacco Use    Smoking status: Current Every Day Smoker     Packs/day: 0 50     Years: 53 00     Pack years: 26 50    Smokeless tobacco: Never Used   Substance and Sexual Activity    Alcohol use: No     Frequency: Never     Drinks per session: 1 or 2     Binge frequency: Never    Drug use: No    Sexual activity: Not Currently   Lifestyle    Physical activity     Days per week: None     Minutes per session: None    Stress: None   Relationships    Social connections     Talks on phone: None     Gets together: None     Attends [employer] service: None     Active member of club or organization: None     Attends meetings of clubs or organizations: None     Relationship status: None    Intimate partner violence     Fear of current or ex partner: None     Emotionally abused: None     Physically abused: None     Forced sexual activity: None   Other Topics Concern    None   Social History Narrative    Always uses seat belt    Daily coffee consumption    Denied: history of daily cola consumption    Denied: history of daily tea consumption    Exercise: walking    No guns in the home    No living will    No [employer]ism beliefs    Water intake, adequate       Medications and Allergies:     Current Outpatient Medications   Medication Sig Dispense Refill    cholecalciferol (VITAMIN D3) 1,000 units tablet Take 1 tablet (1,000 Units total) by mouth daily 90 tablet 3    levothyroxine 125 mcg tablet Take 1 tablet (125 mcg total) by mouth daily 90 tablet 3    predniSONE 5 mg tablet Take 3 tabs once daily (total 15 mg once daily) till follow up visit  270 tablet 1     No current facility-administered medications for this visit        Allergies   Allergen Reactions    Bee Venom     Naproxen Hives    Penicillins Hives    Vicodin  [Hydrocodone-Acetaminophen] Hives and Itching      Immunizations:     Immunization History   Administered Date(s) Administered    INFLUENZA 10/16/2017, 10/31/2018, 08/17/2020    Influenza Quadrivalent, 6-35 Months IM 10/01/2014    Influenza, high dose seasonal 0 7 mL 08/17/2020    Influenza, seasonal, injectable 01/28/2014    Pneumococcal Conjugate 13-Valent 10/16/2017    Pneumococcal Polysaccharide PPV23 10/01/2014, 10/31/2018    SARS-CoV-2 / COVID-19 mRNA IM ([facility] ) 01/22/2021, 02/18/2021      Health Maintenance:         Topic Date Due    MAMMOGRAM  06/28/2019    Lung Cancer Screening  05/12/2021    DXA SCAN  05/12/2023    Colonoscopy Surveillance  12/16/2023    Colorectal Cancer Screening  12/16/2030    Hepatitis C Screening  Completed         Topic Date Due    COVID-19 Vaccine (1) Never done    DTaP,Tdap,and Td Vaccines (1 - Tdap) Never done      Medicare Health Risk Assessment:     /76   Pulse 82   Temp (!) 97 1 °F (36 2 °C)   Ht 5' (1 524 m)   Wt 80 5 kg (177 lb 6 4 oz)   SpO2 98%   BMI 34 65 kg/m²      Kellie John is here for her Subsequent Wellness visit  Last Medicare Wellness visit information reviewed, patient interviewed and updates made to the record today  Health Risk Assessment:   Patient rates overall health as good  Patient feels that their physical health rating is same  Patient is satisfied with their life  Eyesight was rated as same  Hearing was rated as same  Patient feels that their emotional and mental health rating is same  Patients states they are never, rarely angry  Patient states they are never, rarely unusually tired/fatigued  Pain experienced in the last 7 days has been none  Patient states that she has experienced no weight loss or gain in last 6 months  Depression Screening:   PHQ-2 Score: 0      Fall Risk Screening: In the past year, patient has experienced: no history of falling in past year      Urinary Incontinence Screening:   Patient has leaked urine accidently in the last six months  Stress incontinence    Home Safety:  Patient does not have trouble with stairs inside or outside of their home  Patient has working smoke alarms and has working carbon monoxide detector  Home safety hazards include: none  Nutrition:   Current diet is Regular  Medications:   Patient is currently taking over-the-counter supplements  OTC medications include: see medication list  Patient is able to manage medications  Activities of Daily Living (ADLs)/Instrumental Activities of Daily Living (IADLs):   Walk and transfer into and out of bed and chair?: Yes  Dress and groom yourself?: Yes    Bathe or shower yourself?: Yes    Feed yourself? Yes  Do your laundry/housekeeping?: Yes  Manage your money, pay your bills and track your expenses?: Yes  Make your own meals?: Yes    Do your own shopping?: Yes    Previous Hospitalizations:   Any hospitalizations or ED visits within the last 12 months?: No      Advance Care Planning:   Living will: No    Durable POA for healthcare:  No Advanced directive: No    Five wishes given: Yes      Cognitive Screening:   Provider or family/friend/caregiver concerned regarding cognition?: No    PREVENTIVE SCREENINGS      Cardiovascular Screening:    General: Screening Current      Diabetes Screening:     General: Screening Current      Colorectal Cancer Screening:     General: Screening Current      Breast Cancer Screening:     General: Risks and Benefits Discussed    Due for: Mammogram        Cervical Cancer Screening:    General: Screening Not Indicated      Osteoporosis Screening:    General: Screening Current      Abdominal Aortic Aneurysm (AAA) Screening:        General: Screening Not Indicated      Lung Cancer Screening:     General: Risks and Benefits Discussed    Due for: Low Dose CT (LDCT)      Hepatitis C Screening:    General: Screening Current    Screening, Brief Intervention, and Referral to Treatment (SBIRT)    Screening  Typical number of drinks in a day: 0  Typical number of drinks in a week: 0  Interpretation: Low risk drinking behavior  AUDIT-C Screenin) How often did you have a drink containing alcohol in the past year? never  2) How many drinks did you have on a typical day when you were drinking in the past year? never  3) How often did you have 6 or more drinks on one occasion in the past year? never    AUDIT-C Score: 0  Interpretation: Score 0-2 (female): Negative screen for alcohol misuse    Single Item Drug Screening:  How often have you used an illegal drug (including marijuana) or a prescription medication for non-medical reasons in the past year? never    Single Item Drug Screen Score: 0  Interpretation: Negative screen for possible drug use disorder    Other Counseling Topics:   Calcium and vitamin D intake and regular weightbearing exercise  Susan Peace MD   BMI Counseling: Body mass index is 34 65 kg/m²   The BMI is above normal  Nutrition recommendations include moderation in carbohydrate intake, increasing intake of lean protein, reducing intake of saturated fat and trans fat and reducing intake of cholesterol

## 2021-05-14 NOTE — ASSESSMENT & PLAN NOTE
Doing much better on present course of prednisone  No proximal muscular tenderness on exam   Follow-up with rheumatology  Continue present treatment    Recheck 6 months

## 2021-06-11 ENCOUNTER — APPOINTMENT (OUTPATIENT)
Dept: LAB | Facility: CLINIC | Age: 71
End: 2021-06-11
Payer: MEDICARE

## 2021-06-11 DIAGNOSIS — M35.3 PMR (POLYMYALGIA RHEUMATICA) (HCC): ICD-10-CM

## 2021-06-11 DIAGNOSIS — E03.9 PRIMARY HYPOTHYROIDISM: Chronic | ICD-10-CM

## 2021-06-11 DIAGNOSIS — Z13.6 SCREENING FOR CARDIOVASCULAR CONDITION: ICD-10-CM

## 2021-06-11 LAB
ALBUMIN SERPL BCP-MCNC: 3.4 G/DL (ref 3.5–5)
ALP SERPL-CCNC: 63 U/L (ref 46–116)
ALT SERPL W P-5'-P-CCNC: 28 U/L (ref 12–78)
ANION GAP SERPL CALCULATED.3IONS-SCNC: 3 MMOL/L (ref 4–13)
AST SERPL W P-5'-P-CCNC: 28 U/L (ref 5–45)
BASOPHILS # BLD AUTO: 0.1 THOUSANDS/ΜL (ref 0–0.1)
BASOPHILS NFR BLD AUTO: 1 % (ref 0–1)
BILIRUB SERPL-MCNC: 0.48 MG/DL (ref 0.2–1)
BUN SERPL-MCNC: 16 MG/DL (ref 5–25)
CALCIUM ALBUM COR SERPL-MCNC: 9.7 MG/DL (ref 8.3–10.1)
CALCIUM SERPL-MCNC: 9.2 MG/DL (ref 8.3–10.1)
CHLORIDE SERPL-SCNC: 109 MMOL/L (ref 100–108)
CHOLEST SERPL-MCNC: 161 MG/DL (ref 50–200)
CO2 SERPL-SCNC: 27 MMOL/L (ref 21–32)
CREAT SERPL-MCNC: 0.82 MG/DL (ref 0.6–1.3)
EOSINOPHIL # BLD AUTO: 0.19 THOUSAND/ΜL (ref 0–0.61)
EOSINOPHIL NFR BLD AUTO: 2 % (ref 0–6)
ERYTHROCYTE [DISTWIDTH] IN BLOOD BY AUTOMATED COUNT: 15.8 % (ref 11.6–15.1)
GFR SERPL CREATININE-BSD FRML MDRD: 73 ML/MIN/1.73SQ M
GLUCOSE P FAST SERPL-MCNC: 83 MG/DL (ref 65–99)
HCT VFR BLD AUTO: 49.5 % (ref 34.8–46.1)
HDLC SERPL-MCNC: 48 MG/DL
HGB BLD-MCNC: 15.3 G/DL (ref 11.5–15.4)
IMM GRANULOCYTES # BLD AUTO: 0.11 THOUSAND/UL (ref 0–0.2)
IMM GRANULOCYTES NFR BLD AUTO: 1 % (ref 0–2)
LDLC SERPL CALC-MCNC: 88 MG/DL (ref 0–100)
LYMPHOCYTES # BLD AUTO: 2.86 THOUSANDS/ΜL (ref 0.6–4.47)
LYMPHOCYTES NFR BLD AUTO: 22 % (ref 14–44)
MCH RBC QN AUTO: 30.4 PG (ref 26.8–34.3)
MCHC RBC AUTO-ENTMCNC: 30.9 G/DL (ref 31.4–37.4)
MCV RBC AUTO: 98 FL (ref 82–98)
MONOCYTES # BLD AUTO: 1.48 THOUSAND/ΜL (ref 0.17–1.22)
MONOCYTES NFR BLD AUTO: 11 % (ref 4–12)
NEUTROPHILS # BLD AUTO: 8.36 THOUSANDS/ΜL (ref 1.85–7.62)
NEUTS SEG NFR BLD AUTO: 63 % (ref 43–75)
NONHDLC SERPL-MCNC: 113 MG/DL
NRBC BLD AUTO-RTO: 0 /100 WBCS
PLATELET # BLD AUTO: 195 THOUSANDS/UL (ref 149–390)
PMV BLD AUTO: 13.4 FL (ref 8.9–12.7)
POTASSIUM SERPL-SCNC: 5.3 MMOL/L (ref 3.5–5.3)
PROT SERPL-MCNC: 7.9 G/DL (ref 6.4–8.2)
RBC # BLD AUTO: 5.03 MILLION/UL (ref 3.81–5.12)
SODIUM SERPL-SCNC: 139 MMOL/L (ref 136–145)
TRIGL SERPL-MCNC: 126 MG/DL
TSH SERPL DL<=0.05 MIU/L-ACNC: 0.32 UIU/ML (ref 0.36–3.74)
WBC # BLD AUTO: 13.1 THOUSAND/UL (ref 4.31–10.16)

## 2021-06-11 PROCEDURE — 84443 ASSAY THYROID STIM HORMONE: CPT

## 2021-06-11 PROCEDURE — 80061 LIPID PANEL: CPT

## 2021-06-11 PROCEDURE — 85025 COMPLETE CBC W/AUTO DIFF WBC: CPT

## 2021-06-11 PROCEDURE — 36415 COLL VENOUS BLD VENIPUNCTURE: CPT

## 2021-06-11 PROCEDURE — 80053 COMPREHEN METABOLIC PANEL: CPT

## 2021-07-07 DIAGNOSIS — M35.3 PMR (POLYMYALGIA RHEUMATICA) (HCC): ICD-10-CM

## 2021-07-07 RX ORDER — PREDNISONE 1 MG/1
10 TABLET ORAL DAILY
Qty: 60 TABLET | Refills: 1 | Status: SHIPPED | OUTPATIENT
Start: 2021-07-07 | End: 2021-09-08 | Stop reason: SDUPTHER

## 2021-07-07 NOTE — TELEPHONE ENCOUNTER
Pt contacted Call Center requested refill of their medication  Medication Name:  predniSONE       Dosage of Med:  5 mg    Frequency of Med:  Take 3 tabs once daily (total 15 mg once daily) till follow up visit  Remaining Medication:  4     Pharmacy and Location:    [facility] [address]SON LEON [address] [place] [address]     Pt  Preferred Callback Phone Number:  [phone]     Thank you         PLEASE ADVISE PATIENTS:    REFILL REQUESTS WILL

## 2021-07-07 NOTE — TELEPHONE ENCOUNTER
Pt is scheduled for sept can we please send a refill of prednisone for her to the pharmacy to get her through as she has only 3 left   Thanks

## 2021-09-07 NOTE — PROGRESS NOTES
Assessment and Plan:   Ms Julisa De Guzman a 23-year-old female with history significant for polymyalgia rheumatica, primary generalized osteoarthritis and an elevated rheumatoid factor at a titer of 80, who presents for a follow-up  She has been on prednisone 10 mg once daily that was initially started in October 2020      # Polymyalgia rheumatica (diagnosed based on an abrupt onset of joint pains/stiffness affecting the shoulder/hip girdle region on April 27 2020 as well as significantly elevated inflammatory markers and a response to low dose steroids)  - Kailey Scherer presents today for a follow-up of polymyalgia rheumatica that has overall responded well to starting her on steroids  She has not been seen in the office since December 2020 but reports being on prednisone 10 mg once daily for more than 6 months now and has been stable  She is denying any concerns for activity related to the polymyalgia rheumatica at this time and I advised her that we will continue with a gradual steroid taper of decreasing by 1 mg every 4 weeks  I requested she contact the office if she notices worsening of symptoms with doing so  Her inflammation markers will also be repeated to ensure they are down trending     - She does not describe any symptoms that would be concerning for temporal arteritis  - She does also have an elevated rheumatoid factor, but clinically there do not appear to be concerns for rheumatoid arthritis and she will be managed for PMR  Of note she is aware of the side effects associated with chronic steroid use and is up-to-date with a DEXA scan from 2020  She will continue to obtain calcium from dietary sources and I advised her to continue with the daily Vitamin D supplements  Plan:  Diagnoses and all orders for this visit:    PMR (polymyalgia rheumatica) (HCC)  -     C-reactive protein; Future  -     Sedimentation rate, automated;  Future  -     predniSONE 1 mg tablet; Combine to take 9 mg once daily and decrease by 1 mg every 4 weeks  -     predniSONE 5 mg tablet; Combine to take 9 mg once daily and decrease by 1 mg every 4 weeks  Current chronic use of systemic steroids    Elevated rheumatoid factor    Primary generalized (osteo)arthritis      Activities as tolerated  Exercise: try to maintain a low impact exercise regimen as much as possible  Walk for 30 minutes a day for at least 3 days a week  Continue other medications as prescribed by PCP and other specialists  RTC in 6 months          HPI    INITIAL VISIT NOTE (10/2020):  Ms Ivania Salvador a 20-year-old female with history significant for primary generalized osteoarthritis, who presents for further evaluation of an abrupt onset of joint pains as well as an elevated C reactive protein of 44 5 and a rheumatoid factor of 80   She is referred by Dr Roney Ma a rheumatology consult      Patient reports on April 27 she had abrupt onset of pain and stiffness affecting her shoulder and hip girdle region that essentially started overnight   She states the pain has been around her shoulders and upper back with radiation into her upper arms especially on the left side   She is also feeling pain in her hip region radiating into her thighs   She has not had any other significant joint pains start simultaneously   She denies swollen joints (has occasionally had swelling of her knees previously)   She has been experiencing stiffness primarily affecting her shoulder region on the left side which can persist throughout the day   She has been utilizing over-the-counter luis patches as well as Tylenol Arthritis taking 3 times a day which helps her   For further evaluation of her complaints she was seen by her primary care physician in July and had testing done which showed the elevated C reactive protein of 44 5 and a rheumatoid factor of 80   A CBC, CMP, Lyme antibody profile and SYED screen were normal   X-rays of her bilateral shoulders were done which showed osteoarthritic changes   To help with her symptoms she was prescribed a course of prednisone starting at 60 mg with a taper of 1 tablet every 3 days  Rebeka Medrano states that this significantly helped her  Rebeka Medrano has not been prescribed repeat courses of steroids      She mentions possibly over the past year she has had unintentional weight loss of approximately 32 lb   She has seen her primary care physician for this without a clear etiology   She has had a recent CT chest and CT abdomen done which have been unremarkable   She mentions in the past few weeks she has noticed a pain in her left lower jaw when she chews but states she has previously been diagnosed with TMJ arthritis and this feels similar to pain she has experienced before      She denies fevers, significant night sweats, new headaches (has a chronic history of migraines), vision changes, dry eyes, dry mouth, inflammatory eye disease, skin rash, psoriasis, mouth/nose ulcers, swollen glands, pleuritic chest pain, shortness of breath, inflammatory bowel disease, blood in stools, or family history of autoimmune disease   Of note due to a positive Cologuard she is going to be scheduled for a colonoscopy   She denies a history of diabetes         12/2/2020:  Patient presents for a follow-up of polymyalgia rheumatica  She is currently on prednisone 20 mg once daily but unfortunately has been out of her medication since 11/19  We reviewed the x-rays of her hands which showed degenerative changes  Her labs showed an elevated ESR and CRP of 98 and 110 8, respectively  An anti CCP antibody, CK, Sjogren's antibodies, hepatitis panel and SPEP were unremarkable      She reports after starting the prednisone following the last office visit this helped significantly with all of her joint pains and she was able to perform her activities of daily living without any issues  Unfortunately she ran out of the script on 11/19 and did not call the office for a refill  Since being off the steroids all of her joint pains have returned      She has not had any new symptoms such as fevers, ongoing weight loss, new headaches, vision changes or jaw claudication  9/8/2021:  Patient presents for a follow-up of polymyalgia rheumatica  She is currently on prednisone 10 mg once daily  I reviewed her ESR and CRP last done in December 2020 which were elevated at 37 and 27 6, respectively  She has not been seen in the office since December 2020  She reports that from the last office visit she had tapered down on the prednisone from 20 mg once daily gradually down to her current dose of 10 mg once daily  She has been on this dose for more than 6 months now  She reports overall she is feeling quite well on the steroids and does not describe any joint pains, swelling or morning stiffness  She reports on occasion she will have a cramp in her left hand which is associated with pain and she also describes an intermittent numbness sensation in her left forearm region with overhead arm movements  She denies fevers, unintentional weight loss, headaches, scalp tenderness, vision changes or jaw claudication  The following portions of the patient's history were reviewed and updated as appropriate: allergies, current medications, past family history, past medical history, past social history, past surgical history and problem list       Review of Systems  Constitutional: Negative for weight change, fevers, chills, night sweats, fatigue  ENT/Mouth: Negative for hearing changes, ear pain, nasal congestion, sinus pain, hoarseness, sore throat, rhinorrhea, swallowing difficulty  Eyes: Negative for pain, redness, discharge, vision changes  Cardiovascular: Negative for chest pain, SOB, palpitations  Respiratory: Negative for cough, sputum, wheezing, dyspnea  Gastrointestinal: Negative for nausea, vomiting, diarrhea, constipation, pain, heartburn    Genitourinary: Negative for dysuria, urinary frequency, hematuria  Musculoskeletal: As per HPI  Skin: Negative for skin rash, color changes  Neuro: Negative for weakness, numbness, tingling, loss of consciousness  Psych: Negative for anxiety, depression  Heme/Lymph: Negative for easy bruising, bleeding, lymphadenopathy  Past Medical History:   Diagnosis Date    Disease of thyroid gland     Polymyalgia rheumatica (HCC)        Past Surgical History:   Procedure Laterality Date    CHOLECYSTECTOMY      FOOT SURGERY      GALLBLADDER SURGERY      SHOULDER ARTHROSCOPY Right        Social History     Socioeconomic History    Marital status: Single     Spouse name: Not on file    Number of children: 3    Years of education: HIGH SCHOOL GRADUATE     Highest education level: Not on file   Occupational History    Occupation: RETIRED    Tobacco Use    Smoking status: Current Every Day Smoker     Packs/day: 0 50     Years: 53 00     Pack years: 26 50    Smokeless tobacco: Never Used   Vaping Use    Vaping Use: Never used   Substance and Sexual Activity    Alcohol use: No    Drug use: No    Sexual activity: Not Currently   Other Topics Concern    Not on file   Social History Narrative    Always uses seat belt    Daily coffee consumption    Denied: history of daily cola consumption    Denied: history of daily tea consumption    Exercise: walking    No guns in the home    No living will    No Quaker beliefs    Water intake, adequate      Social Determinants of Health     Financial Resource Strain:     Difficulty of Paying Living Expenses:    Food Insecurity:     Worried About Running Out of Food in the Last Year:     Ran Out of Food in the Last Year:    Transportation Needs:     Lack of Transportation (Medical):      Lack of Transportation (Non-Medical):    Physical Activity:     Days of Exercise per Week:     Minutes of Exercise per Session:    Stress:     Feeling of Stress :    Social Connections:     Frequency of Communication with Friends and Family:     Frequency of Social Gatherings with Friends and Family:     Attends Christianity Services:     Active Member of Clubs or Organizations:     Attends Club or Organization Meetings:     Marital Status:    Intimate Partner Violence:     Fear of Current or Ex-Partner:     Emotionally Abused:     Physically Abused:     Sexually Abused:        Family History   Problem Relation Age of Onset    Heart disease Mother     Diabetes Mother     Hypertension Mother     Breast cancer Sister     Colon cancer Brother     Heart disease Brother     Diabetes Brother     Hypertension Brother        Allergies   Allergen Reactions    Bee Venom     Naproxen Hives    Penicillins Hives    Vicodin  [Hydrocodone-Acetaminophen] Hives and Itching       Current Outpatient Medications:     cholecalciferol (VITAMIN D3) 1,000 units tablet, Take 1 tablet (1,000 Units total) by mouth daily, Disp: 90 tablet, Rfl: 3    levothyroxine 125 mcg tablet, Take 1 tablet (125 mcg total) by mouth daily, Disp: 90 tablet, Rfl: 3    predniSONE 1 mg tablet, Combine to take 9 mg once daily and decrease by 1 mg every 4 weeks  , Disp: 120 tablet, Rfl: 5    predniSONE 5 mg tablet, Combine to take 9 mg once daily and decrease by 1 mg every 4 weeks  , Disp: 90 tablet, Rfl: 1      Objective:    Vitals:    09/08/21 0941   BP: 132/82   Pulse: 91   Weight: 78 5 kg (173 lb)       Physical Exam  General: Well appearing, well nourished, in no distress  Oriented x 3, normal mood and affect  Ambulating without difficulty  Skin: Good turgor, no rash, unusual bruising or prominent lesions  Hair: Normal texture and distribution  Nails: Normal color, no deformities  HEENT:  Head: Normocephalic, atraumatic  Eyes: Conjunctiva clear, sclera non-icteric, EOM intact  Extremities: No amputations or deformities, cyanosis, edema  Musculoskeletal:  No swelling visualized  Neurologic: Alert and oriented   No focal neurological deficits appreciated  Psychiatric: Normal mood and affect  CARA Johnson    Rheumatology

## 2021-09-08 ENCOUNTER — APPOINTMENT (OUTPATIENT)
Dept: LAB | Facility: CLINIC | Age: 71
End: 2021-09-08
Payer: MEDICARE

## 2021-09-08 ENCOUNTER — OFFICE VISIT (OUTPATIENT)
Dept: RHEUMATOLOGY | Facility: CLINIC | Age: 71
End: 2021-09-08
Payer: MEDICARE

## 2021-09-08 VITALS
DIASTOLIC BLOOD PRESSURE: 82 MMHG | WEIGHT: 173 LBS | SYSTOLIC BLOOD PRESSURE: 132 MMHG | BODY MASS INDEX: 33.79 KG/M2 | HEART RATE: 91 BPM

## 2021-09-08 DIAGNOSIS — M35.3 PMR (POLYMYALGIA RHEUMATICA) (HCC): Primary | ICD-10-CM

## 2021-09-08 DIAGNOSIS — R76.8 ELEVATED RHEUMATOID FACTOR: ICD-10-CM

## 2021-09-08 DIAGNOSIS — M35.3 PMR (POLYMYALGIA RHEUMATICA) (HCC): ICD-10-CM

## 2021-09-08 DIAGNOSIS — Z79.52 CURRENT CHRONIC USE OF SYSTEMIC STEROIDS: ICD-10-CM

## 2021-09-08 DIAGNOSIS — M15.0 PRIMARY GENERALIZED (OSTEO)ARTHRITIS: ICD-10-CM

## 2021-09-08 LAB
CRP SERPL QL: 13.5 MG/L
ERYTHROCYTE [SEDIMENTATION RATE] IN BLOOD: 26 MM/HOUR (ref 0–29)

## 2021-09-08 PROCEDURE — 36415 COLL VENOUS BLD VENIPUNCTURE: CPT

## 2021-09-08 PROCEDURE — 85652 RBC SED RATE AUTOMATED: CPT

## 2021-09-08 PROCEDURE — 99214 OFFICE O/P EST MOD 30 MIN: CPT | Performed by: INTERNAL MEDICINE

## 2021-09-08 PROCEDURE — 86140 C-REACTIVE PROTEIN: CPT

## 2021-09-08 RX ORDER — PREDNISONE 1 MG/1
TABLET ORAL
Qty: 90 TABLET | Refills: 1 | Status: SHIPPED | OUTPATIENT
Start: 2021-09-08 | End: 2022-04-20

## 2021-09-08 RX ORDER — PREDNISONE 1 MG/1
TABLET ORAL
Qty: 120 TABLET | Refills: 5 | Status: SHIPPED | OUTPATIENT
Start: 2021-09-08 | End: 2021-10-15 | Stop reason: ALTCHOICE

## 2021-10-15 ENCOUNTER — OFFICE VISIT (OUTPATIENT)
Dept: FAMILY MEDICINE CLINIC | Facility: CLINIC | Age: 71
End: 2021-10-15
Payer: MEDICARE

## 2021-10-15 VITALS
DIASTOLIC BLOOD PRESSURE: 80 MMHG | WEIGHT: 173 LBS | HEIGHT: 60 IN | TEMPERATURE: 97.9 F | HEART RATE: 86 BPM | BODY MASS INDEX: 33.96 KG/M2 | SYSTOLIC BLOOD PRESSURE: 122 MMHG

## 2021-10-15 DIAGNOSIS — M71.21 BAKER'S CYST OF KNEE, RIGHT: ICD-10-CM

## 2021-10-15 DIAGNOSIS — M25.561 ACUTE PAIN OF RIGHT KNEE: Primary | ICD-10-CM

## 2021-10-15 PROCEDURE — 20610 DRAIN/INJ JOINT/BURSA W/O US: CPT | Performed by: FAMILY MEDICINE

## 2021-10-15 PROCEDURE — 99213 OFFICE O/P EST LOW 20 MIN: CPT | Performed by: FAMILY MEDICINE

## 2021-10-15 RX ORDER — METHYLPREDNISOLONE ACETATE 80 MG/ML
1 INJECTION, SUSPENSION INTRA-ARTICULAR; INTRALESIONAL; INTRAMUSCULAR; SOFT TISSUE
Status: COMPLETED | OUTPATIENT
Start: 2021-10-15 | End: 2021-10-15

## 2021-10-15 RX ORDER — LIDOCAINE HYDROCHLORIDE 10 MG/ML
4 INJECTION, SOLUTION INFILTRATION; PERINEURAL
Status: COMPLETED | OUTPATIENT
Start: 2021-10-15 | End: 2021-10-15

## 2021-10-15 RX ADMIN — METHYLPREDNISOLONE ACETATE 1 ML: 80 INJECTION, SUSPENSION INTRA-ARTICULAR; INTRALESIONAL; INTRAMUSCULAR; SOFT TISSUE at 13:20

## 2021-10-15 RX ADMIN — LIDOCAINE HYDROCHLORIDE 4 ML: 10 INJECTION, SOLUTION INFILTRATION; PERINEURAL at 13:20

## 2021-11-11 DIAGNOSIS — E03.9 HYPOTHYROIDISM, UNSPECIFIED TYPE: ICD-10-CM

## 2021-11-11 RX ORDER — LEVOTHYROXINE SODIUM 0.12 MG/1
125 TABLET ORAL DAILY
Qty: 90 TABLET | Refills: 3 | Status: SHIPPED | OUTPATIENT
Start: 2021-11-11 | End: 2022-03-24 | Stop reason: SDUPTHER

## 2021-11-30 ENCOUNTER — OFFICE VISIT (OUTPATIENT)
Dept: FAMILY MEDICINE CLINIC | Facility: CLINIC | Age: 71
End: 2021-11-30
Payer: MEDICARE

## 2021-11-30 VITALS
TEMPERATURE: 98.9 F | SYSTOLIC BLOOD PRESSURE: 122 MMHG | HEIGHT: 60 IN | WEIGHT: 171 LBS | BODY MASS INDEX: 33.57 KG/M2 | HEART RATE: 82 BPM | DIASTOLIC BLOOD PRESSURE: 80 MMHG

## 2021-11-30 DIAGNOSIS — M35.3 PMR (POLYMYALGIA RHEUMATICA) (HCC): ICD-10-CM

## 2021-11-30 DIAGNOSIS — Z72.0 TOBACCO ABUSE: ICD-10-CM

## 2021-11-30 DIAGNOSIS — E03.9 PRIMARY HYPOTHYROIDISM: Primary | Chronic | ICD-10-CM

## 2021-11-30 PROCEDURE — 99214 OFFICE O/P EST MOD 30 MIN: CPT | Performed by: FAMILY MEDICINE

## 2022-03-23 ENCOUNTER — APPOINTMENT (OUTPATIENT)
Dept: LAB | Facility: CLINIC | Age: 72
End: 2022-03-23
Payer: MEDICARE

## 2022-03-23 ENCOUNTER — OFFICE VISIT (OUTPATIENT)
Dept: FAMILY MEDICINE CLINIC | Facility: CLINIC | Age: 72
End: 2022-03-23
Payer: MEDICARE

## 2022-03-23 VITALS
HEART RATE: 92 BPM | RESPIRATION RATE: 16 BRPM | BODY MASS INDEX: 31.41 KG/M2 | DIASTOLIC BLOOD PRESSURE: 78 MMHG | OXYGEN SATURATION: 98 % | HEIGHT: 60 IN | TEMPERATURE: 97.1 F | SYSTOLIC BLOOD PRESSURE: 122 MMHG | WEIGHT: 160 LBS

## 2022-03-23 DIAGNOSIS — E03.9 HYPOTHYROIDISM, UNSPECIFIED TYPE: ICD-10-CM

## 2022-03-23 DIAGNOSIS — R35.1 NOCTURIA: ICD-10-CM

## 2022-03-23 DIAGNOSIS — R31.29 MICROSCOPIC HEMATURIA: ICD-10-CM

## 2022-03-23 DIAGNOSIS — R60.9 PERIPHERAL EDEMA: Primary | ICD-10-CM

## 2022-03-23 DIAGNOSIS — Z13.6 SCREENING FOR CARDIOVASCULAR CONDITION: ICD-10-CM

## 2022-03-23 DIAGNOSIS — R60.9 PERIPHERAL EDEMA: ICD-10-CM

## 2022-03-23 DIAGNOSIS — M35.3 PMR (POLYMYALGIA RHEUMATICA) (HCC): ICD-10-CM

## 2022-03-23 LAB
ALBUMIN SERPL BCP-MCNC: 3.6 G/DL (ref 3.5–5)
ALP SERPL-CCNC: 89 U/L (ref 46–116)
ALT SERPL W P-5'-P-CCNC: 27 U/L (ref 12–78)
ANION GAP SERPL CALCULATED.3IONS-SCNC: 2 MMOL/L (ref 4–13)
AST SERPL W P-5'-P-CCNC: 22 U/L (ref 5–45)
BACTERIA UR QL AUTO: NORMAL /HPF
BASOPHILS # BLD AUTO: 0.1 THOUSANDS/ΜL (ref 0–0.1)
BASOPHILS NFR BLD AUTO: 1 % (ref 0–1)
BILIRUB SERPL-MCNC: 0.37 MG/DL (ref 0.2–1)
BILIRUB UR QL STRIP: NEGATIVE
BUN SERPL-MCNC: 14 MG/DL (ref 5–25)
CALCIUM SERPL-MCNC: 9.7 MG/DL (ref 8.3–10.1)
CHLORIDE SERPL-SCNC: 106 MMOL/L (ref 100–108)
CHOLEST SERPL-MCNC: 155 MG/DL
CLARITY UR: CLEAR
CO2 SERPL-SCNC: 28 MMOL/L (ref 21–32)
COLOR UR: NORMAL
CREAT SERPL-MCNC: 0.85 MG/DL (ref 0.6–1.3)
EOSINOPHIL # BLD AUTO: 0.12 THOUSAND/ΜL (ref 0–0.61)
EOSINOPHIL NFR BLD AUTO: 1 % (ref 0–6)
ERYTHROCYTE [DISTWIDTH] IN BLOOD BY AUTOMATED COUNT: 13.5 % (ref 11.6–15.1)
GFR SERPL CREATININE-BSD FRML MDRD: 69 ML/MIN/1.73SQ M
GLUCOSE P FAST SERPL-MCNC: 94 MG/DL (ref 65–99)
GLUCOSE UR STRIP-MCNC: NEGATIVE MG/DL
HCT VFR BLD AUTO: 46.1 % (ref 34.8–46.1)
HDLC SERPL-MCNC: 36 MG/DL
HGB BLD-MCNC: 14.8 G/DL (ref 11.5–15.4)
HGB UR QL STRIP.AUTO: NEGATIVE
IMM GRANULOCYTES # BLD AUTO: 0.04 THOUSAND/UL (ref 0–0.2)
IMM GRANULOCYTES NFR BLD AUTO: 0 % (ref 0–2)
KETONES UR STRIP-MCNC: NEGATIVE MG/DL
LDLC SERPL CALC-MCNC: 97 MG/DL (ref 0–100)
LEUKOCYTE ESTERASE UR QL STRIP: NEGATIVE
LYMPHOCYTES # BLD AUTO: 2.62 THOUSANDS/ΜL (ref 0.6–4.47)
LYMPHOCYTES NFR BLD AUTO: 24 % (ref 14–44)
MCH RBC QN AUTO: 30.9 PG (ref 26.8–34.3)
MCHC RBC AUTO-ENTMCNC: 32.1 G/DL (ref 31.4–37.4)
MCV RBC AUTO: 96 FL (ref 82–98)
MONOCYTES # BLD AUTO: 1.14 THOUSAND/ΜL (ref 0.17–1.22)
MONOCYTES NFR BLD AUTO: 11 % (ref 4–12)
NEUTROPHILS # BLD AUTO: 6.72 THOUSANDS/ΜL (ref 1.85–7.62)
NEUTS SEG NFR BLD AUTO: 63 % (ref 43–75)
NITRITE UR QL STRIP: NEGATIVE
NON-SQ EPI CELLS URNS QL MICRO: NORMAL /HPF
NONHDLC SERPL-MCNC: 119 MG/DL
NRBC BLD AUTO-RTO: 0 /100 WBCS
PH UR STRIP.AUTO: 5 [PH]
PLATELET # BLD AUTO: 288 THOUSANDS/UL (ref 149–390)
PMV BLD AUTO: 12.7 FL (ref 8.9–12.7)
POTASSIUM SERPL-SCNC: 4.7 MMOL/L (ref 3.5–5.3)
PROT SERPL-MCNC: 8.2 G/DL (ref 6.4–8.2)
PROT UR STRIP-MCNC: NEGATIVE MG/DL
RBC # BLD AUTO: 4.79 MILLION/UL (ref 3.81–5.12)
RBC #/AREA URNS AUTO: NORMAL /HPF
SL AMB  POCT GLUCOSE, UA: ABNORMAL
SL AMB LEUKOCYTE ESTERASE,UA: ABNORMAL
SL AMB POCT BILIRUBIN,UA: ABNORMAL
SL AMB POCT BLOOD,UA: ABNORMAL
SL AMB POCT CLARITY,UA: CLEAR
SL AMB POCT COLOR,UA: YELLOW
SL AMB POCT KETONES,UA: ABNORMAL
SL AMB POCT NITRITE,UA: ABNORMAL
SL AMB POCT PH,UA: 5
SL AMB POCT SPECIFIC GRAVITY,UA: 1.01
SL AMB POCT URINE PROTEIN: ABNORMAL
SL AMB POCT UROBILINOGEN: ABNORMAL
SODIUM SERPL-SCNC: 136 MMOL/L (ref 136–145)
SP GR UR STRIP.AUTO: 1.01 (ref 1–1.03)
T4 FREE SERPL-MCNC: 1.64 NG/DL (ref 0.76–1.46)
TRIGL SERPL-MCNC: 112 MG/DL
TSH SERPL DL<=0.05 MIU/L-ACNC: 0.11 UIU/ML (ref 0.36–3.74)
UROBILINOGEN UR STRIP-ACNC: <2 MG/DL
WBC # BLD AUTO: 10.74 THOUSAND/UL (ref 4.31–10.16)
WBC #/AREA URNS AUTO: NORMAL /HPF

## 2022-03-23 PROCEDURE — 81001 URINALYSIS AUTO W/SCOPE: CPT | Performed by: FAMILY MEDICINE

## 2022-03-23 PROCEDURE — 81002 URINALYSIS NONAUTO W/O SCOPE: CPT | Performed by: FAMILY MEDICINE

## 2022-03-23 PROCEDURE — 80061 LIPID PANEL: CPT

## 2022-03-23 PROCEDURE — 36415 COLL VENOUS BLD VENIPUNCTURE: CPT

## 2022-03-23 PROCEDURE — 84439 ASSAY OF FREE THYROXINE: CPT

## 2022-03-23 PROCEDURE — 84443 ASSAY THYROID STIM HORMONE: CPT

## 2022-03-23 PROCEDURE — 99214 OFFICE O/P EST MOD 30 MIN: CPT | Performed by: FAMILY MEDICINE

## 2022-03-23 PROCEDURE — 93000 ELECTROCARDIOGRAM COMPLETE: CPT | Performed by: FAMILY MEDICINE

## 2022-03-23 PROCEDURE — 87086 URINE CULTURE/COLONY COUNT: CPT | Performed by: FAMILY MEDICINE

## 2022-03-23 PROCEDURE — 80053 COMPREHEN METABOLIC PANEL: CPT

## 2022-03-23 PROCEDURE — 85025 COMPLETE CBC W/AUTO DIFF WBC: CPT

## 2022-03-23 NOTE — PROGRESS NOTES
Assessment/Plan:    Primary hypothyroidism  ?as cause of edema? Check labs    PMR (polymyalgia rheumatica) ([facility])  Stable off of prednisone  Continue to monitor  F/u with rheum  Recheck 3m    Peripheral edema  Appears to be related to prolonged standing  Elevation legs seems to improve edema  No JVD or gallop appreciated on exam   EKG shows persistent right bundle branch block and is unchanged from previous EKGs  Recommend low salt diet, compression stockings other conservative measures for now  Check labs  Recheck if not improved in 1-2 weeks - earlier if worse    Nocturia  Related to edema? UA revealed microscopic hematuria but no other significant findings  Will check labs  Treated edema as directed  Urine sent for microscopic and culture  Recheck 3 months       Diagnoses and all orders for this visit:    Peripheral edema  -     POCT ECG  -     CBC and differential; Future  -     Comprehensive metabolic panel; Future  -     Cancel: POCT ECG    Hypothyroidism, unspecified type  -     TSH, 3rd generation with Free T4 reflex; Future    Nocturia  -     POCT urine dip  -     Cancel: POCT ECG    Microscopic hematuria  -     Urinalysis with microscopic  -     Urine culture; Future  -     Urine culture    PMR (polymyalgia rheumatica) ([facility])    Screening for cardiovascular condition  -     Lipid panel; Future  -     Cancel: POCT ECG          Subjective:      Patient ID: Peter Hair is a 71 y o  female  67-year-old female awoke Saturday morning with swelling of her bilateral feet ankles and lower legs  This was not associated with any shortness of breath, or chest discomfort  Patient did state it was difficult to ambulate because her feet were so swollen  Patient went back to bed and elevated her legs all day  By Sunday they were much better  Patient return to work on Monday but only worked for 3 hours (patient works in the [employer] at [address] [employer] a [employer] [facility])    She did note that her legs were low bit more swollen by the end the day but not as bad as they were on Saturday  Patient was off yesterday and kept her legs elevated  They seem to be better today  Patient denies any chest pains or shortness of breath with exertion  She also denies orthopnea  She has chronic nocturia, getting up approximately every 2 hours or so to urinate which has been going on for years  She has been having some intermittent left lower quadrant abdominal pain which has been going on for approximately 1 month  She also has a history of some intermittent low back pain that is worsened with prolonged standing   - patient has been losing weight since finishing prednisone for her PMR  Patient stop prednisone at the end of November  Since then, she finds that her appetite is not nearly as strong and that she has been eating less  The following portions of the patient's history were reviewed and updated as appropriate:   She  has a past medical history of Disease of thyroid gland and Polymyalgia rheumatica ([facility] )  She   Patient Active Problem List    Diagnosis Date Noted    Peripheral edema 03/25/2022    Nocturia 03/25/2022    Tobacco abuse 11/30/2021    Adult BMI 31 0-31 9 kg/sq m 12/16/2020    PMR (pol[facility]algia rhe[facility] ([facility] ) 11/10/2020    Urinary frequency 11/10/2020    Screening for cervical cancer 06/18/2018    White coat syndrome with high blood pressure but without hypertension 06/18/2018    Atypical migraine 08/29/2017    Primary hypothyroidism 08/29/2017     She  has a past surgical history that includes Cholecystectomy; Foot surgery; Gallbladder surgery; and Shoulder arthroscopy (Right)  She  reports that she has been smoking  She has a 26 50 pack-year smoking history  She has never used smokeless tobacco  She reports that she does not drink alcohol and does not use drugs    Current Outpatient Medications   Medication Sig Dispense Refill    cholecalciferol (VITAMIN D3) 1,000 units tablet Take 1 tablet (1,000 Units total) by mouth daily 90 tablet 3    levothyroxine 112 mcg tablet Take 1 tablet (112 mcg total) by mouth daily 30 tablet 5    predniSONE 5 mg tablet Combine to take 9 mg once daily and decrease by 1 mg every 4 weeks  90 tablet 1     No current facility-administered medications for this visit  She is allergic to bee venom, naproxen, penicillins, and vicodin  [hydrocodone-acetaminophen]       Review of Systems   Constitutional: Positive for appetite change and unexpected weight change  Negative for activity change, chills, fatigue and fever  HENT: Negative  Eyes: Negative  Respiratory: Negative  Cardiovascular: Positive for leg swelling  Negative for chest pain and palpitations  Gastrointestinal: Negative  Genitourinary: Negative  Nocturia x 3-5   Musculoskeletal: Positive for arthralgias and gait problem (when legs are swollen)  Skin: Negative  Neurological: Negative for dizziness, weakness and numbness  Hematological: Negative  Psychiatric/Behavioral: Negative  Objective:      /78   Pulse 92   Temp (!) 97 1 °F (36 2 °C)   Resp 16   Ht 5' (1 524 m)   Wt 72 6 kg (160 lb)   SpO2 98%   BMI 31 25 kg/m²          Physical Exam  Vitals reviewed  Constitutional:       Appearance: She is well-developed  She is not diaphoretic  HENT:      Head: Normocephalic and atraumatic  Right Ear: Tympanic membrane, ear canal and external ear normal       Left Ear: Tympanic membrane, ear canal and external ear normal    Eyes:      Extraocular Movements: Extraocular movements intact  Conjunctiva/sclera: Conjunctivae normal       Pupils: Pupils are equal, round, and reactive to light  Neck:      Thyroid: No thyromegaly  Vascular: No JVD  Comments: No JVD appreciated  Cardiovascular:      Rate and Rhythm: Normal rate and regular rhythm  Pulses: Normal pulses  Heart sounds: No murmur heard  No gallop      Pulmonary:      Effort: Pulmonary effort is normal       Breath sounds: Normal breath sounds  Abdominal:      General: There is no distension  Palpations: Abdomen is soft  There is no mass  Tenderness: There is no abdominal tenderness  Musculoskeletal:         General: Deformity (mild diffuse OA changes in hands) present  No swelling  Normal range of motion  Cervical back: Normal range of motion and neck supple  No muscular tenderness  Right lower leg: Edema (tr-1+) present  Left lower leg: Edema (tr-1+) present  Lymphadenopathy:      Cervical: No cervical adenopathy  Skin:     General: Skin is warm and dry  Capillary Refill: Capillary refill takes less than 2 seconds  Neurological:      General: No focal deficit present  Mental Status: She is alert and oriented to person, place, and time  Cranial Nerves: No cranial nerve deficit  Sensory: No sensory deficit  Motor: No weakness or abnormal muscle tone  Deep Tendon Reflexes: Reflexes are normal and symmetric     Psychiatric:         Mood and Affect: Mood normal

## 2022-03-24 LAB — BACTERIA UR CULT: NORMAL

## 2022-03-25 PROBLEM — R60.0 PERIPHERAL EDEMA: Status: ACTIVE | Noted: 2022-03-25

## 2022-03-25 PROBLEM — R35.1 NOCTURIA: Status: ACTIVE | Noted: 2022-03-25

## 2022-03-25 PROBLEM — R60.9 PERIPHERAL EDEMA: Status: ACTIVE | Noted: 2022-03-25

## 2022-03-25 NOTE — ASSESSMENT & PLAN NOTE
Related to edema? UA revealed microscopic hematuria but no other significant findings  Will check labs  Treated edema as directed  Urine sent for microscopic and culture    Recheck 3 months

## 2022-03-25 NOTE — ASSESSMENT & PLAN NOTE
Appears to be related to prolonged standing  Elevation legs seems to improve edema  No JVD or gallop appreciated on exam   EKG shows persistent right bundle branch block and is unchanged from previous EKGs  Recommend low salt diet, compression stockings other conservative measures for now  Check labs    Recheck if not improved in 1-2 weeks - earlier if worse

## 2022-03-28 ENCOUNTER — TELEPHONE (OUTPATIENT)
Dept: FAMILY MEDICINE CLINIC | Facility: CLINIC | Age: 72
End: 2022-03-28

## 2022-03-28 NOTE — TELEPHONE ENCOUNTER
Her last TSH was too low, so I decreased the dose to 112mcg   It looks like we called and left a message regarding that

## 2022-03-28 NOTE — TELEPHONE ENCOUNTER
Patient was seen on 03/24/22   She is confused with her Levothyroxine   She stated she's been taking 125 mcg and has a lot of them left    And you called in 112 mcg  What dose should she be taking?      Please advise

## 2022-04-20 ENCOUNTER — OFFICE VISIT (OUTPATIENT)
Dept: RHEUMATOLOGY | Facility: CLINIC | Age: 72
End: 2022-04-20
Payer: MEDICARE

## 2022-04-20 ENCOUNTER — APPOINTMENT (OUTPATIENT)
Dept: LAB | Facility: CLINIC | Age: 72
End: 2022-04-20
Payer: MEDICARE

## 2022-04-20 ENCOUNTER — TELEPHONE (OUTPATIENT)
Dept: RHEUMATOLOGY | Facility: CLINIC | Age: 72
End: 2022-04-20

## 2022-04-20 VITALS
HEART RATE: 80 BPM | SYSTOLIC BLOOD PRESSURE: 128 MMHG | DIASTOLIC BLOOD PRESSURE: 82 MMHG | WEIGHT: 160 LBS | BODY MASS INDEX: 31.25 KG/M2

## 2022-04-20 DIAGNOSIS — Z92.241 HISTORY OF RECENT STEROID USE: ICD-10-CM

## 2022-04-20 DIAGNOSIS — R76.8 ELEVATED RHEUMATOID FACTOR: ICD-10-CM

## 2022-04-20 DIAGNOSIS — M35.3 PMR (POLYMYALGIA RHEUMATICA) (HCC): ICD-10-CM

## 2022-04-20 DIAGNOSIS — M15.0 PRIMARY GENERALIZED (OSTEO)ARTHRITIS: ICD-10-CM

## 2022-04-20 DIAGNOSIS — M35.3 PMR (POLYMYALGIA RHEUMATICA) (HCC): Primary | ICD-10-CM

## 2022-04-20 LAB
CRP SERPL QL: 24.3 MG/L
ERYTHROCYTE [SEDIMENTATION RATE] IN BLOOD: 48 MM/HOUR (ref 0–29)

## 2022-04-20 PROCEDURE — 85652 RBC SED RATE AUTOMATED: CPT

## 2022-04-20 PROCEDURE — 99215 OFFICE O/P EST HI 40 MIN: CPT | Performed by: INTERNAL MEDICINE

## 2022-04-20 PROCEDURE — 86140 C-REACTIVE PROTEIN: CPT

## 2022-04-20 PROCEDURE — 36415 COLL VENOUS BLD VENIPUNCTURE: CPT

## 2022-04-20 RX ORDER — PREDNISONE 1 MG/1
5 TABLET ORAL DAILY
Qty: 90 TABLET | Refills: 1 | Status: SHIPPED | OUTPATIENT
Start: 2022-04-20

## 2022-04-20 NOTE — PROGRESS NOTES
Assessment and Plan:   Ms Kd Quiñonez a 46-year-old female with history significant for polymyalgia rheumatica, primary generalized osteoarthritis and an elevated rheumatoid factor at a titer of 80, who presents for a follow-up  Vick Bhatia has been off steroids since 11/2021       # Polymyalgia rheumatica (diagnosed based on an abrupt onset of joint pains/stiffness affecting the shoulder/hip girdle region on April 27 2020 as well as significantly elevated inflammatory markers and a response to low dose steroids)  - Alyse presents today for a follow-up of polymyalgia rheumatica that had overall responded well to a course of steroids which was started in April 2020 and completed in November 2021  She reports with completely discontinuing the prednisone since November 2021 she has experienced a flare-up of joint pains as described in today's HPI, but I suspect these may be secondary to issues such as osteoarthritis affecting these joints as opposed to the polymyalgia rheumatica  She may take Tylenol or NSAIDs as needed to help with her symptoms  I also discussed with her that we will repeat the inflammation markers today and if they are elevated and as she obtains significant response with low doses of prednisone I may consider restarting her on prednisone at 5 mg once daily that she can continue indefinitely for now  I will contact her after I receive the results of the ESR and CRP  - She does not describe any symptoms that would be concerning for temporal arteritis  - She does also have an elevated rheumatoid factor, but clinically there do not appear to be concerns for rheumatoid arthritis and I will continue to monitor for this possibility        Plan:  Diagnoses and all orders for this visit:    PMR (polymyalgia rheumatica) (HCC)  -     C-reactive protein; Future  -     Sedimentation rate, automated;  Future    History of recent steroid use    Elevated rheumatoid factor    Primary generalized (osteo)arthritis      Activities as tolerated  Exercise: try to maintain a low impact exercise regimen as much as possible  Walk for 30 minutes a day for at least 3 days a week  Continue other medications as prescribed by PCP and other specialists  RTC in 6 months          HPI    INITIAL VISIT NOTE (10/2020):  Ms Sandy Ryan a 61-year-old female with history significant for primary generalized osteoarthritis, who presents for further evaluation of an abrupt onset of joint pains as well as an elevated C reactive protein of 44 5 and a rheumatoid factor of 80   She is referred by Dr Celeste Mendiola a rheumatology consult      Patient reports on April 27 she had abrupt onset of pain and stiffness affecting her shoulder and hip girdle region that essentially started overnight   She states the pain has been around her shoulders and upper back with radiation into her upper arms especially on the left side   She is also feeling pain in her hip region radiating into her thighs   She has not had any other significant joint pains start simultaneously   She denies swollen joints (has occasionally had swelling of her knees previously)   She has been experiencing stiffness primarily affecting her shoulder region on the left side which can persist throughout the day   She has been utilizing over-the-counter luis patches as well as Tylenol Arthritis taking 3 times a day which helps her   For further evaluation of her complaints she was seen by her primary care physician in July and had testing done which showed the elevated C reactive protein of 44 5 and a rheumatoid factor of 80   A CBC, CMP, Lyme antibody profile and SYED screen were normal   X-rays of her bilateral shoulders were done which showed osteoarthritic changes   To help with her symptoms she was prescribed a course of prednisone starting at 60 mg with a taper of 1 tablet every 3 days  Amaya Zavala states that this significantly helped her  Amaya Zavala has not been prescribed repeat courses of steroids      She mentions possibly over the past year she has had unintentional weight loss of approximately 32 lb   She has seen her primary care physician for this without a clear etiology   She has had a recent CT chest and CT abdomen done which have been unremarkable   She mentions in the past few weeks she has noticed a pain in her left lower jaw when she chews but states she has previously been diagnosed with TMJ arthritis and this feels similar to pain she has experienced before      She denies fevers, significant night sweats, new headaches (has a chronic history of migraines), vision changes, dry eyes, dry mouth, inflammatory eye disease, skin rash, psoriasis, mouth/nose ulcers, swollen glands, pleuritic chest pain, shortness of breath, inflammatory bowel disease, blood in stools, or family history of autoimmune disease   Of note due to a positive Cologuard she is going to be scheduled for a colonoscopy   She denies a history of diabetes         12/2/2020:  Patient presents for a follow-up of polymyalgia rheumatica  Waylon Latham is currently on prednisone 20 mg once daily but unfortunately has been out of her medication since 11/19   We reviewed the x-rays of her hands which showed degenerative changes   Her labs showed an elevated ESR and CRP of 98 and 110 8, respectively   An anti CCP antibody, CK, Sjogren's antibodies, hepatitis panel and SPEP were unremarkable      She reports after starting the prednisone following the last office visit this helped significantly with all of her joint pains and she was able to perform her activities of daily living without any issues   Unfortunately she ran out of the script on 11/19 and did not call the office for a refill   Since being off the steroids all of her joint pains have returned      She has not had any new symptoms such as fevers, ongoing weight loss, new headaches, vision changes or jaw claudication         9/8/2021:  Patient presents for a follow-up of polymyalgia rheumatica  Lashanda Green is currently on prednisone 10 mg once daily  I reviewed her ESR and CRP last done in December 2020 which were elevated at 37 and 27 6, respectively  She has not been seen in the office since December 2020        She reports that from the last office visit she had tapered down on the prednisone from 20 mg once daily gradually down to her current dose of 10 mg once daily  She has been on this dose for more than 6 months now  She reports overall she is feeling quite well on the steroids and does not describe any joint pains, swelling or morning stiffness  She reports on occasion she will have a cramp in her left hand which is associated with pain and she also describes an intermittent numbness sensation in her left forearm region with overhead arm movements  She denies fevers, unintentional weight loss, headaches, scalp tenderness, vision changes or jaw claudication  4/20/2022:  Patient presents for a follow-up of polymyalgia rheumatica  She completed the prednisone taper in November 2021  I reviewed her labs done after the last office visit which showed a normal ESR but with a slightly elevated C reactive protein of 13 5  After the last office visit she decreased the prednisone by 1 mg every 4 weeks and reports that she completed the steroids in November (this does not correlate with decreasing by 1 mg every 4 weeks, starting at a dose of 10 mg once daily)  She reports even with the low doses of prednisone she felt stable but after stopping the steroids completely she noticed a recurrence of pain mostly affecting her shoulders, right elbow when she sleeps, right hand 2nd MCP joint, right knee where she is known to have a Baker's cyst as well as in her low back region where she has degenerative arthritis  No joint pains throughout her hands otherwise, wrists, left elbow, hips, left knee, ankles or feet    She did have an episode of ankle and foot swelling for which she was seen by her primary care physician  She reports the swelling resolves with leg elevation  She mostly experiences morning stiffness affecting her low back  She tries to avoid any over-the-counter pain medications  No symptoms such as fevers, unintentional weight loss, headaches, scalp tenderness, vision changes or jaw claudication  The following portions of the patient's history were reviewed and updated as appropriate: allergies, current medications, past family history, past medical history, past social history, past surgical history and problem list       Review of Systems  Constitutional: Negative for weight change, fevers, chills, night sweats, fatigue  ENT/Mouth: Negative for hearing changes, ear pain, nasal congestion, sinus pain, hoarseness, sore throat, rhinorrhea, swallowing difficulty  Eyes: Negative for pain, redness, discharge, vision changes  Cardiovascular: Negative for chest pain, SOB, palpitations  Respiratory: Negative for cough, sputum, wheezing, dyspnea  Gastrointestinal: Negative for nausea, vomiting, diarrhea, constipation, pain, heartburn  Genitourinary: Negative for dysuria, urinary frequency, hematuria  Musculoskeletal: As per HPI  Skin: Negative for skin rash, color changes  Neuro: Negative for weakness, numbness, tingling, loss of consciousness  Psych: Negative for anxiety, depression  Heme/Lymph: Negative for easy bruising, bleeding, lymphadenopathy          Past Medical History:   Diagnosis Date    Disease of thyroid gland     Polymyalgia rheumatica (HCC)        Past Surgical History:   Procedure Laterality Date    CHOLECYSTECTOMY      FOOT SURGERY      GALLBLADDER SURGERY      SHOULDER ARTHROSCOPY Right        Social History     Socioeconomic History    Marital status: Single     Spouse name: Not on file    Number of children: 3    Years of education: HIGH SCHOOL GRADUATE     Highest education level: Not on file   Occupational History    Occupation: RETIRED    Tobacco Use    Smoking status: Current Every Day Smoker     Packs/day: 0 50     Years: 53 00     Pack years: 26 50    Smokeless tobacco: Never Used   Vaping Use    Vaping Use: Never used   Substance and Sexual Activity    Alcohol use: No    Drug use: No    Sexual activity: Not Currently   Other Topics Concern    Not on file   Social History Narrative    Always uses seat belt    Daily coffee consumption    Denied: history of daily cola consumption    Denied: history of daily tea consumption    Exercise: walking    No guns in the home    No living will    No Church beliefs    Water intake, adequate      Social Determinants of Health     Financial Resource Strain: Not on file   Food Insecurity: Not on file   Transportation Needs: Not on file   Physical Activity: Not on file   Stress: Not on file   Social Connections: Not on file   Intimate Partner Violence: Not on file   Housing Stability: Not on file       Family History   Problem Relation Age of Onset    Heart disease Mother     Diabetes Mother     Hypertension Mother     Breast cancer Sister     Colon cancer Brother     Heart disease Brother     Diabetes Brother     Hypertension Brother        Allergies   Allergen Reactions    Bee Venom     Naproxen Hives    Penicillins Hives    Vicodin  [Hydrocodone-Acetaminophen] Hives and Itching       Current Outpatient Medications:     cholecalciferol (VITAMIN D3) 1,000 units tablet, Take 1 tablet (1,000 Units total) by mouth daily, Disp: 90 tablet, Rfl: 3    levothyroxine 112 mcg tablet, Take 1 tablet (112 mcg total) by mouth daily, Disp: 30 tablet, Rfl: 5      Objective:    Vitals:    04/20/22 1027   BP: 128/82   Pulse: 80   Weight: 72 6 kg (160 lb)       Physical Exam  General: Well appearing, well nourished, in no distress  Oriented x 3, normal mood and affect  Ambulating without difficulty  Skin: Good turgor, no rash, unusual bruising or prominent lesions    Hair: Normal texture and distribution  Nails: Normal color, no deformities  HEENT:  Head: Normocephalic, atraumatic  Eyes: Conjunctiva clear, sclera non-icteric, EOM intact  Extremities: No amputations or deformities, cyanosis, edema  Musculoskeletal:   Hands - mild prominence noted at the right hand 2nd MCP joint  Neurologic: Alert and oriented  No focal neurological deficits appreciated  Psychiatric: Normal mood and affect  CARA Alvarez    Rheumatology

## 2022-04-20 NOTE — TELEPHONE ENCOUNTER
Please let patient knows the inflammatory markers are elevated and as we discussed at the office visit I will start her back on prednisone at 5 mg once daily  I sent this to her pharmacy  If it does not help please have her call us back, thanks

## 2022-05-13 ENCOUNTER — OFFICE VISIT (OUTPATIENT)
Dept: FAMILY MEDICINE CLINIC | Facility: CLINIC | Age: 72
End: 2022-05-13
Payer: MEDICARE

## 2022-05-13 VITALS
HEART RATE: 76 BPM | BODY MASS INDEX: 31.41 KG/M2 | HEIGHT: 60 IN | DIASTOLIC BLOOD PRESSURE: 78 MMHG | WEIGHT: 160 LBS | SYSTOLIC BLOOD PRESSURE: 120 MMHG | TEMPERATURE: 98.4 F

## 2022-05-13 DIAGNOSIS — R60.9 PERIPHERAL EDEMA: ICD-10-CM

## 2022-05-13 DIAGNOSIS — M35.3 PMR (POLYMYALGIA RHEUMATICA) (HCC): ICD-10-CM

## 2022-05-13 DIAGNOSIS — E03.9 PRIMARY HYPOTHYROIDISM: Chronic | ICD-10-CM

## 2022-05-13 DIAGNOSIS — Z72.0 TOBACCO ABUSE: ICD-10-CM

## 2022-05-13 DIAGNOSIS — Z00.00 MEDICARE ANNUAL WELLNESS VISIT, SUBSEQUENT: Primary | ICD-10-CM

## 2022-05-13 PROCEDURE — G0439 PPPS, SUBSEQ VISIT: HCPCS | Performed by: FAMILY MEDICINE

## 2022-05-13 PROCEDURE — 99214 OFFICE O/P EST MOD 30 MIN: CPT | Performed by: FAMILY MEDICINE

## 2022-05-13 NOTE — PROGRESS NOTES
Assessment/Plan:    Primary hypothyroidism  Appears to be stable clinically  Monitor TSH  Adjust meds if TSH is not at goal  Recheck 6m      Tobacco abuse  Counseled re: smoking cessation and lung cancer screening  Recheck 6m    PMR (polymyalgia rheumatica) (HCC)  Stable on prednisone  F/u with Rheum  Recheck 6m    Peripheral edema  Stable  Monitor weight  Recheck 6m       Diagnoses and all orders for this visit:    Medicare annual wellness visit, subsequent    Primary hypothyroidism  -     TSH, 3rd generation; Future    PMR (polymyalgia rheumatica) (HCC)    Peripheral edema    Tobacco abuse        Subjective:      Patient ID: Iliana Howard is a 71 y o  female  f/u multiple med issues and AWV  - pt feels well  - notes that her leg swellign has resolved, despite her standing on her feet all day  Denies CP, palpitations, lightheadedness or other CV symptoms with or without exertion  - joints pains are stable  Rheum restarted pred last month after symptoms worsened  Presently on 5mg qd  To f/u in October with Rhuem  - pt denies any new GI ro  issues  Still with urinary frequency but not leaking    - still smoking 1/2 ppd  Denies worsening SOB  Refuses lung CA CT scan  - still refuses to have mammo and dexa  - AWV done          The following portions of the patient's history were reviewed and updated as appropriate:   She  has a past medical history of Disease of thyroid gland and Polymyalgia rheumatica ([facility] [address] )    She   Patient Active Problem List    Diagnosis Date Noted    Peripheral edema 03/25/2022    Nocturia 03/25/2022    Tobacco abuse 11/30/2021    Adult BMI 31 0-31 9 kg/sq m 12/16/2020    PMR (polymyalgia rheumatica) ([facility] [address] ) 11/10/2020    Urinary frequency 11/10/2020    Screening for cervical cancer 06/18/2018    White coat syndrome with high blood pressure but without hypertension 06/18/2018    Atypical migraine 08/29/2017    Primary hypothyroidism 08/29/2017     She  has a past surgical history that includes Cholecystectomy; Foot surgery; Gallbladder surgery; and Shoulder arthroscopy (Right)  She  reports that she has been smoking  She has a 26 50 pack-year smoking history  She has never used smokeless tobacco  She reports that she does not drink alcohol and does not use drugs  Current Outpatient Medications   Medication Sig Dispense Refill    cholecalciferol (VITAMIN D3) 1,000 units tablet Take 1 tablet (1,000 Units total) by mouth daily 90 tablet 3    levothyroxine 112 mcg tablet Take 1 tablet (112 mcg total) by mouth daily 30 tablet 5    predniSONE 5 mg tablet Take 1 tablet (5 mg total) by mouth daily 90 tablet 1     No current facility-administered medications for this visit  She is allergic to bee venom, naproxen, penicillins, and vicodin  [hydrocodone-acetaminophen]       Review of Systems   Constitutional: Negative  HENT: Negative  Eyes: Negative  Respiratory: Negative  Cardiovascular: Negative  Negative for chest pain, palpitations and leg swelling  Gastrointestinal: Negative  Genitourinary: Negative  Nocturia x 3-5   Musculoskeletal: Positive for arthralgias (scattered)  Negative for gait problem (resolved)  Skin: Negative  Neurological: Negative for dizziness, weakness and numbness  Hematological: Negative  Psychiatric/Behavioral: Negative  Objective:      /78   Pulse 76   Temp 98 4 °F (36 9 °C)   Ht 5' (1 524 m)   Wt 72 6 kg (160 lb)   BMI 31 25 kg/m²          Physical Exam  Vitals reviewed  Constitutional:       Appearance: She is well-developed  She is not diaphoretic  HENT:      Head: Normocephalic and atraumatic  Right Ear: Tympanic membrane, ear canal and external ear normal       Left Ear: Tympanic membrane, ear canal and external ear normal       Mouth/Throat:      Mouth: Mucous membranes are moist    Eyes:      Extraocular Movements: Extraocular movements intact        Conjunctiva/sclera: Conjunctivae normal  Pupils: Pupils are equal, round, and reactive to light  Neck:      Thyroid: No thyromegaly  Vascular: No JVD  Cardiovascular:      Rate and Rhythm: Normal rate and regular rhythm  Heart sounds: No murmur heard  Pulmonary:      Effort: Pulmonary effort is normal       Breath sounds: Normal breath sounds  Abdominal:      General: There is no distension  Palpations: Abdomen is soft  There is no mass  Tenderness: There is no abdominal tenderness  Musculoskeletal:         General: No swelling, tenderness or deformity  Normal range of motion  Cervical back: Normal range of motion and neck supple  No muscular tenderness  Right lower leg: No edema  Left lower leg: No edema  Lymphadenopathy:      Cervical: No cervical adenopathy  Skin:     General: Skin is warm and dry  Capillary Refill: Capillary refill takes less than 2 seconds  Neurological:      Mental Status: She is alert and oriented to person, place, and time  Cranial Nerves: No cranial nerve deficit  Sensory: No sensory deficit  Motor: No weakness or abnormal muscle tone  Gait: Gait normal       Deep Tendon Reflexes: Reflexes are normal and symmetric  Comments: minicog 5/5   Psychiatric:         Mood and Affect: Mood normal          Behavior: Behavior normal          Thought Content:  Thought content normal          Judgment: Judgment normal       Comments: PHQ-2/9 Depression Screening    Little interest or pleasure in doing things: 0 - not at all  Feeling down, depressed, or hopeless: 0 - not at all  PHQ-2 Score: 0  PHQ-2 Interpretation: Negative depression screen

## 2022-05-13 NOTE — PROGRESS NOTES
Assessment and Plan:     Problem List Items Addressed This Visit        Endocrine    Primary hypothyroidism (Chronic)     Appears to be stable clinically  Monitor TSH  Adjust meds if TSH is not at goal  Recheck 6m             Relevant Orders    TSH, 3rd generation       Other    Peripheral edema     Stable  Monitor weight  Recheck 6m           PMR (polymyalgia rheumatica) (HCC)     Stable on prednisone  F/u with Rheum  Recheck 6m           Tobacco abuse     Counseled re: smoking cessation and lung cancer screening  Recheck 6m             Other Visit Diagnoses     Medicare annual wellness visit, subsequent    -  Primary           Preventive health issues were discussed with patient, and age appropriate screening tests were ordered as noted in patient's After Visit Summary  Personalized health advice and appropriate referrals for health education or preventive services given if needed, as noted in patient's After Visit Summary       History of Present Illness:     Patient presents for Medicare Annual Wellness visit    Patient Care Team:  Deborah Arevalo MD as PCP - General     Problem List:     Patient Active Problem List   Diagnosis    Atypical migraine    Primary hypothyroidism    Screening for cervical cancer    White coat syndrome with high blood pressure but without hypertension    PMR (polymyalgia rheumatica) (HCC)    Urinary frequency    Adult BMI 31 0-31 9 kg/sq m    Tobacco abuse    Peripheral edema    Nocturia      Past Medical and Surgical History:     Past Medical History:   Diagnosis Date    Disease of thyroid gland     Polymyalgia rheumatica ([address] )      Past Surgical History:   Procedure Laterality Date    CHOLECYSTECTOMY      FOOT SURGERY      GALLBLADDER SURGERY      SHOULDER ARTHROSCOPY Right       Family History:     Family History   Problem Relation Age of Onset    Heart disease Mother     Diabetes Mother     Hypertension Mother     Breast cancer Sister     Colon cancer Brother     Heart disease Brother     Diabetes Brother     Hypertension Brother       Social History:     Social History     Socioeconomic History    Marital status: Single     Spouse name: None    Number of children: 3    Years of education: HIGH SCHOOL GRADUATE     Highest education level: None   Occupational History    Occupation: RETIRED    Tobacco Use    Smoking status: Current Every Day Smoker     Packs/day: 0 50     Years: 53 00     Pack years: 26 50    Smokeless tobacco: Never Used   Vaping Use    Vaping Use: Never used   Substance and Sexual Activity    Alcohol use: No    Drug use: No    Sexual activity: Not Currently   Other Topics Concern    None   Social History Narrative    Always uses seat belt    Daily coffee consumption    Denied: history of daily cola consumption    Denied: history of daily tea consumption    Exercise: walking    No guns in the home    No living will    No Confucianism beliefs    Water intake, adequate      Social Determinants of Health     Financial Resource Strain: Not on file   Food Insecurity: Not on file   Transportation Needs: Not on file   Physical Activity: Not on file   Stress: Not on file   Social Connections: Not on file   Intimate Partner Violence: Not on file   Housing Stability: Not on file      Medications and Allergies:     Current Outpatient Medications   Medication Sig Dispense Refill    cholecalciferol (VITAMIN D3) 1,000 units tablet Take 1 tablet (1,000 Units total) by mouth daily 90 tablet 3    levothyroxine 112 mcg tablet Take 1 tablet (112 mcg total) by mouth daily 30 tablet 5    predniSONE 5 mg tablet Take 1 tablet (5 mg total) by mouth daily 90 tablet 1     No current facility-administered medications for this visit       Allergies   Allergen Reactions    Bee Venom     Naproxen Hives    Penicillins Hives    Vicodin  [Hydrocodone-Acetaminophen] Hives and Itching      Immunizations:     Immunization History   Administered Date(s) Administered  COVID-19 MODERNA VACC 0 5 ML IM 01/22/2021, 02/18/2021    INFLUENZA 10/16/2017, 10/31/2018, 08/17/2020, 10/14/2021    Influenza Quadrivalent, 6-35 Months IM 10/01/2014    Influenza, high dose seasonal 0 7 mL 08/17/2020    Influenza, seasonal, injectable 01/28/2014    Pneumococcal Conjugate 13-Valent 10/16/2017    Pneumococcal Polysaccharide PPV23 10/01/2014, 10/31/2018      Health Maintenance:         Topic Date Due    Breast Cancer Screening: Mammogram  06/28/2019    Lung Cancer Screening  05/12/2021    DXA SCAN  05/12/2023    Colorectal Cancer Screening  12/16/2023    Hepatitis C Screening  Completed         Topic Date Due    DTaP,Tdap,and Td Vaccines (1 - Tdap) Never done    COVID-19 Vaccine (3 - Moderna risk series) 03/18/2021      Medicare Health Risk Assessment:     /78   Pulse 76   Temp 98 4 °F (36 9 °C)   Ht 5' (1 524 m)   Wt 72 6 kg (160 lb)   BMI 31 25 kg/m²      Otto Catalan is here for her Subsequent Wellness visit  Health Risk Assessment:   Patient rates overall health as very good  Patient feels that their physical health rating is same  Patient is very satisfied with their life  Eyesight was rated as same  Hearing was rated as same  Patient feels that their emotional and mental health rating is same  Patients states they are never, rarely angry  Patient states they are sometimes unusually tired/fatigued  Pain experienced in the last 7 days has been none  Patient states that she has experienced no weight loss or gain in last 6 months  Depression Screening:   PHQ-2 Score: 0      Fall Risk Screening: In the past year, patient has experienced: no history of falling in past year      Urinary Incontinence Screening:   Patient has not leaked urine accidently in the last six months  Home Safety:  Patient does not have trouble with stairs inside or outside of their home  Patient has working smoke alarms and has working carbon monoxide detector   Home safety hazards include: none      Nutrition:   Current diet is Regular  Medications:   Patient is currently taking over-the-counter supplements  OTC medications include: see medication list  Patient is able to manage medications  Activities of Daily Living (ADLs)/Instrumental Activities of Daily Living (IADLs):   Walk and transfer into and out of bed and chair?: Yes  Dress and groom yourself?: Yes    Bathe or shower yourself?: Yes    Feed yourself? Yes  Do your laundry/housekeeping?: Yes  Manage your money, pay your bills and track your expenses?: Yes  Make your own meals?: Yes    Do your own shopping?: Yes    Previous Hospitalizations:   Any hospitalizations or ED visits within the last 12 months?: No      Advance Care Planning:   Living will: No    Durable POA for healthcare: No    Advanced directive: No    Advanced directive counseling given: Yes      Cognitive Screening:   Provider or family/friend/caregiver concerned regarding cognition?: No    PREVENTIVE SCREENINGS      Cardiovascular Screening:    General: Screening Current      Diabetes Screening:     General: Screening Current      Colorectal Cancer Screening:     General: Screening Current      Breast Cancer Screening:     General: Patient Declines      Cervical Cancer Screening:    General: Screening Not Indicated      Osteoporosis Screening:    General: Screening Current      Abdominal Aortic Aneurysm (AAA) Screening:        General: Screening Not Indicated      Lung Cancer Screening:     General: Screening Not Indicated      Hepatitis C Screening:    General: Screening Current    Screening, Brief Intervention, and Referral to Treatment (SBIRT)    Screening    Typical number of drinks in a week: 0    AUDIT-C Screenin) How often did you have a drink containing alcohol in the past year? never  2) How many drinks did you have on a typical day when you were drinking in the past year? 0  3) How often did you have 6 or more drinks on one occasion in the past year? never    AUDIT-C Score: 0  Interpretation: Score 0-2 (female): Negative screen for alcohol misuse    Single Item Drug Screening:  How often have you used an illegal drug (including marijuana) or a prescription medication for non-medical reasons in the past year? never    Single Item Drug Screen Score: 0  Interpretation: Negative screen for possible drug use disorder    Other Counseling Topics:   Calcium and vitamin D intake and regular weightbearing exercise  BMI Counseling: Body mass index is 31 25 kg/m²  The BMI is above normal  Nutrition recommendations include reducing portion sizes, moderation in carbohydrate intake, increasing intake of lean protein, reducing intake of saturated fat and trans fat and reducing intake of cholesterol  Exercise recommendations include exercising 3-5 times per week      Joel Courtney MD

## 2022-09-26 DIAGNOSIS — E03.9 HYPOTHYROIDISM, UNSPECIFIED TYPE: ICD-10-CM

## 2022-09-26 RX ORDER — LEVOTHYROXINE SODIUM 112 UG/1
112 TABLET ORAL DAILY
Qty: 90 TABLET | Refills: 1 | Status: SHIPPED | OUTPATIENT
Start: 2022-09-26

## 2022-11-15 ENCOUNTER — RA CDI HCC (OUTPATIENT)
Dept: OTHER | Facility: HOSPITAL | Age: 72
End: 2022-11-15

## 2022-11-15 NOTE — PROGRESS NOTES
[address]  coding opportunities       Chart reviewed, no opportunity found: CHART REVIEWED, NO OPPORTUNITY FOUND        Patients Insurance     Medicare Insurance: Medicare

## 2022-11-22 ENCOUNTER — OFFICE VISIT (OUTPATIENT)
Dept: FAMILY MEDICINE CLINIC | Facility: CLINIC | Age: 72
End: 2022-11-22

## 2022-11-22 VITALS
SYSTOLIC BLOOD PRESSURE: 136 MMHG | DIASTOLIC BLOOD PRESSURE: 74 MMHG | BODY MASS INDEX: 33.38 KG/M2 | WEIGHT: 170 LBS | TEMPERATURE: 97.7 F | RESPIRATION RATE: 16 BRPM | HEART RATE: 66 BPM | HEIGHT: 60 IN | OXYGEN SATURATION: 99 %

## 2022-11-22 DIAGNOSIS — Z72.0 TOBACCO ABUSE: ICD-10-CM

## 2022-11-22 DIAGNOSIS — M35.3 PMR (POLYMYALGIA RHEUMATICA) (HCC): ICD-10-CM

## 2022-11-22 DIAGNOSIS — Z13.83 SCREENING FOR CARDIOVASCULAR, RESPIRATORY, AND GENITOURINARY DISEASES: ICD-10-CM

## 2022-11-22 DIAGNOSIS — Z13.6 SCREENING FOR CARDIOVASCULAR, RESPIRATORY, AND GENITOURINARY DISEASES: ICD-10-CM

## 2022-11-22 DIAGNOSIS — E03.9 PRIMARY HYPOTHYROIDISM: Chronic | ICD-10-CM

## 2022-11-22 DIAGNOSIS — E03.9 HYPOTHYROIDISM, UNSPECIFIED TYPE: Primary | ICD-10-CM

## 2022-11-22 DIAGNOSIS — Z13.89 SCREENING FOR CARDIOVASCULAR, RESPIRATORY, AND GENITOURINARY DISEASES: ICD-10-CM

## 2022-11-22 RX ORDER — VARENICLINE TARTRATE 25 MG
KIT ORAL
Qty: 53 EACH | Refills: 0 | Status: SHIPPED | OUTPATIENT
Start: 2022-11-22

## 2022-11-22 NOTE — PROGRESS NOTES
Name: Delano Carmona      : 1950      MRN: [id number]  Encounter Provider: Jaciel Epps MD  Encounter Date: 2022   Encounter department: [address]     1  Hypothyroidism, unspecified type  -     TSH, 3rd generation; Future    2  PMR (polymyalgia rheumatica) (HCC)  Assessment & Plan:  Muscles were not tender to palp  Check labs including ESR, and CRP  Consider referral back to rheum if inflammatory markers are high  Recheck 6m - earlier if worse    Orders:  -     CBC and differential; Future  -     Comprehensive metabolic panel; Future  -     C-reactive protein; Future  -     Sedimentation rate, automated; Future    3  Tobacco abuse  Assessment & Plan:  I discussed with pt  She states that she had done well on Chantix without significant side effects in the past  Will restart the starter pack  Recheck 1m by phone - earlier if she is not effective, or pt with significant side effects    Orders:  -     varenicline 0 5 MG X 11 & 1 MG X 42 tablet therapy pack; Take as directed per pack    4  Screening for cardiovascular, respiratory, and genitourinary diseases  -     CBC and differential; Future  -     Comprehensive metabolic panel; Future  -     Lipid panel; Future    5  Primary hypothyroidism  Assessment & Plan:  Appears to be stable clinically  Check TSH  Adjust meds if TSH is not at goal  Recheck 6m               Subjective     f/u multiple med issues  - pt feels well  - leg edema never returned  - pt stopped her pred and did not return to Rheum  Has daily bodyaches that "I deal with"  - pt is active at work and does frequent lifting but does not have a formal exercise routine  Patient denies any chest pains, palpitations, lightheadedness or other cardiovascular symptoms with without exertion  - pt is still smoking 1/2 ppd  She was on Chantix in the past and would like to try it again  - pt denies any new GI ro  issues       - still refuses to have mammo and dexa  Review of Systems   Constitutional: Negative  HENT: Negative  Eyes: Negative  Respiratory: Negative  Cardiovascular: Negative  Negative for chest pain, palpitations and leg swelling  Gastrointestinal: Negative  Genitourinary: Negative  Musculoskeletal: Positive for arthralgias (scattered) and myalgias  Negative for back pain, gait problem and joint swelling  Skin: Negative  Neurological: Negative for dizziness, facial asymmetry, weakness, light-headedness, numbness and headaches  Hematological: Negative  Psychiatric/Behavioral: Negative          Past Medical History:   Diagnosis Date   • Disease of thyroid gland    • Polymyalgia rhe[address]a ([facility] [address] )      Past Surgical History:   Procedure Laterality Date   • CHOLECYSTECTOMY     • FOOT SURGERY     • GALLBLADDER SURGERY     • SHOULDER ARTHROSCOPY Right      Family History   Problem Relation Age of Onset   • Heart disease Mother    • Diabetes Mother    • Hypertension Mother    • Breast cancer Sister    • Colon cancer Brother    • Heart disease Brother    • Diabetes Brother    • Hypertension Brother      Social History     Socioeconomic History   • Marital status: Single     Spouse name: None   • Number of children: 4   • Years of education: HIGH SCHOOL GRADUATE    • Highest education level: None   Occupational History   • Occupation: RETIRED    Tobacco Use   • Smoking status: Every Day     Packs/day: 0 50     Years: 53 00     Pack years: 26 50     Types: Cigarettes   • Smokeless tobacco: Never   Vaping Use   • Vaping Use: Never used   Substance and Sexual Activity   • Alcohol use: No   • Drug use: No   • Sexual activity: Not Currently   Other Topics Concern   • None   Social History Narrative    Always uses seat belt    Daily coffee consumption    Denied: history of daily cola consumption    Denied: history of daily tea consumption    Exercise: walking    No guns in the home    No living will    No Buddhism beliefs    Water intake, adequate      Social Determinants of Health     Financial Resource Strain: Not on file   Food Insecurity: Not on file   Transportation Needs: Not on file   Physical Activity: Not on file   Stress: Not on file   Social Connections: Not on file   Intimate Partner Violence: Not on file   Housing Stability: Not on file     Current Outpatient Medications on File Prior to Visit   Medication Sig   • cholecalciferol (VITAMIN D3) 1,000 units tablet Take 1 tablet (1,000 Units total) by mouth daily   • levothyroxine 112 mcg tablet Take 1 tablet (112 mcg total) by mouth daily   • [DISCONTINUED] predniSONE 5 mg tablet Take 1 tablet (5 mg total) by mouth daily     Allergies   Allergen Reactions   • Bee Venom    • Naproxen Hives   • Penicillins Hives   • Vicodin  [Hydrocodone-Acetaminophen] Hives and Itching     Immunization History   Administered Date(s) Administered   • COVID-19 MODERNA VACC 0 5 ML IM 01/22/2021, 02/18/2021   • INFLUENZA 10/16/2017, 10/31/2018, 08/17/2020, 10/14/2021, 11/13/2022   • Influenza Quadrivalent, 6-35 Months IM 10/01/2014   • Influenza, high dose seasonal 0 7 mL 08/17/2020   • Influenza, seasonal, injectable 01/28/2014   • Pneumococcal Conjugate 13-Valent 10/16/2017   • Pneumococcal Polysaccharide PPV23 10/01/2014, 10/31/2018       Objective     /74   Pulse 66   Temp 97 7 °F (36 5 °C)   Resp 16   Ht 5' (1 524 m)   Wt 77 1 kg (170 lb)   SpO2 99%   BMI 33 20 kg/m²     Physical Exam  Vitals reviewed  Constitutional:       Appearance: She is well-developed  She is not diaphoretic  HENT:      Head: Normocephalic and atraumatic  Right Ear: Tympanic membrane, ear canal and external ear normal       Left Ear: Tympanic membrane, ear canal and external ear normal       Nose: Nose normal       Mouth/Throat:      Mouth: Mucous membranes are moist    Eyes:      Extraocular Movements: Extraocular movements intact        Conjunctiva/sclera: Conjunctivae normal  Pupils: Pupils are equal, round, and reactive to light  Neck:      Thyroid: No thyromegaly  Vascular: No JVD  Cardiovascular:      Rate and Rhythm: Normal rate and regular rhythm  Pulses: Normal pulses  Heart sounds: No murmur heard  Pulmonary:      Effort: Pulmonary effort is normal       Breath sounds: Normal breath sounds  Abdominal:      General: There is no distension  Palpations: Abdomen is soft  There is no mass  Tenderness: There is no abdominal tenderness  Musculoskeletal:         General: Deformity (mild arthritic changes) present  No swelling or tenderness  Normal range of motion  Cervical back: Normal range of motion and neck supple  No tenderness  No muscular tenderness  Right lower leg: No edema  Left lower leg: No edema  Lymphadenopathy:      Cervical: No cervical adenopathy  Skin:     General: Skin is warm and dry  Capillary Refill: Capillary refill takes less than 2 seconds  Neurological:      Mental Status: She is alert and oriented to person, place, and time  Cranial Nerves: No cranial nerve deficit  Sensory: No sensory deficit  Motor: No weakness or abnormal muscle tone  Gait: Gait normal       Deep Tendon Reflexes: Reflexes are normal and symmetric  Psychiatric:         Mood and Affect: Mood normal          Behavior: Behavior normal          Thought Content:  Thought content normal          Judgment: Judgment normal       Comments:             Adan Ruiz MD

## 2022-11-22 NOTE — ASSESSMENT & PLAN NOTE
I discussed with pt  She states that she had done well on Chantix without significant side effects in the past  Will restart the starter pack   Recheck 1m by phone - earlier if she is not effective, or pt with significant side effects

## 2022-11-22 NOTE — ASSESSMENT & PLAN NOTE
Muscles were not tender to palp  Check labs including ESR, and CRP  Consider referral back to rheum if inflammatory markers are high   Recheck 6m - earlier if worse pt denies/yes

## 2022-11-23 ENCOUNTER — APPOINTMENT (OUTPATIENT)
Dept: LAB | Facility: CLINIC | Age: 72
End: 2022-11-23

## 2022-11-23 DIAGNOSIS — M35.3 PMR (POLYMYALGIA RHEUMATICA) (HCC): ICD-10-CM

## 2022-11-23 DIAGNOSIS — Z13.6 SCREENING FOR CARDIOVASCULAR, RESPIRATORY, AND GENITOURINARY DISEASES: ICD-10-CM

## 2022-11-23 DIAGNOSIS — Z13.83 SCREENING FOR CARDIOVASCULAR, RESPIRATORY, AND GENITOURINARY DISEASES: ICD-10-CM

## 2022-11-23 DIAGNOSIS — Z13.89 SCREENING FOR CARDIOVASCULAR, RESPIRATORY, AND GENITOURINARY DISEASES: ICD-10-CM

## 2022-11-23 DIAGNOSIS — E03.9 HYPOTHYROIDISM, UNSPECIFIED TYPE: ICD-10-CM

## 2022-11-23 LAB
ALBUMIN SERPL BCP-MCNC: 3.5 G/DL (ref 3.5–5)
ALP SERPL-CCNC: 88 U/L (ref 46–116)
ALT SERPL W P-5'-P-CCNC: 36 U/L (ref 12–78)
ANION GAP SERPL CALCULATED.3IONS-SCNC: 5 MMOL/L (ref 4–13)
AST SERPL W P-5'-P-CCNC: 24 U/L (ref 5–45)
BASOPHILS # BLD AUTO: 0.12 THOUSANDS/ÂΜL (ref 0–0.1)
BASOPHILS NFR BLD AUTO: 1 % (ref 0–1)
BILIRUB SERPL-MCNC: 0.38 MG/DL (ref 0.2–1)
BUN SERPL-MCNC: 19 MG/DL (ref 5–25)
CALCIUM SERPL-MCNC: 9.8 MG/DL (ref 8.3–10.1)
CHLORIDE SERPL-SCNC: 106 MMOL/L (ref 96–108)
CHOLEST SERPL-MCNC: 187 MG/DL
CO2 SERPL-SCNC: 27 MMOL/L (ref 21–32)
CREAT SERPL-MCNC: 0.91 MG/DL (ref 0.6–1.3)
CRP SERPL QL: 7.6 MG/L
EOSINOPHIL # BLD AUTO: 0.29 THOUSAND/ÂΜL (ref 0–0.61)
EOSINOPHIL NFR BLD AUTO: 2 % (ref 0–6)
ERYTHROCYTE [DISTWIDTH] IN BLOOD BY AUTOMATED COUNT: 13.3 % (ref 11.6–15.1)
ERYTHROCYTE [SEDIMENTATION RATE] IN BLOOD: 25 MM/HOUR (ref 0–29)
GFR SERPL CREATININE-BSD FRML MDRD: 63 ML/MIN/1.73SQ M
GLUCOSE P FAST SERPL-MCNC: 101 MG/DL (ref 65–99)
HCT VFR BLD AUTO: 48.7 % (ref 34.8–46.1)
HDLC SERPL-MCNC: 40 MG/DL
HGB BLD-MCNC: 15.8 G/DL (ref 11.5–15.4)
IMM GRANULOCYTES # BLD AUTO: 0.09 THOUSAND/UL (ref 0–0.2)
IMM GRANULOCYTES NFR BLD AUTO: 1 % (ref 0–2)
LDLC SERPL CALC-MCNC: 124 MG/DL (ref 0–100)
LYMPHOCYTES # BLD AUTO: 3.49 THOUSANDS/ÂΜL (ref 0.6–4.47)
LYMPHOCYTES NFR BLD AUTO: 28 % (ref 14–44)
MCH RBC QN AUTO: 32.8 PG (ref 26.8–34.3)
MCHC RBC AUTO-ENTMCNC: 32.4 G/DL (ref 31.4–37.4)
MCV RBC AUTO: 101 FL (ref 82–98)
MONOCYTES # BLD AUTO: 1.24 THOUSAND/ÂΜL (ref 0.17–1.22)
MONOCYTES NFR BLD AUTO: 10 % (ref 4–12)
NEUTROPHILS # BLD AUTO: 7.22 THOUSANDS/ÂΜL (ref 1.85–7.62)
NEUTS SEG NFR BLD AUTO: 58 % (ref 43–75)
NONHDLC SERPL-MCNC: 147 MG/DL
NRBC BLD AUTO-RTO: 0 /100 WBCS
PLATELET # BLD AUTO: 239 THOUSANDS/UL (ref 149–390)
PMV BLD AUTO: 12.5 FL (ref 8.9–12.7)
POTASSIUM SERPL-SCNC: 4.2 MMOL/L (ref 3.5–5.3)
PROT SERPL-MCNC: 8.6 G/DL (ref 6.4–8.4)
RBC # BLD AUTO: 4.81 MILLION/UL (ref 3.81–5.12)
SODIUM SERPL-SCNC: 138 MMOL/L (ref 135–147)
TRIGL SERPL-MCNC: 114 MG/DL
TSH SERPL DL<=0.05 MIU/L-ACNC: 2.59 UIU/ML (ref 0.45–4.5)
WBC # BLD AUTO: 12.45 THOUSAND/UL (ref 4.31–10.16)

## 2023-01-25 ENCOUNTER — TELEPHONE (OUTPATIENT)
Dept: FAMILY MEDICINE CLINIC | Facility: CLINIC | Age: 73
End: 2023-01-25

## 2023-01-25 NOTE — TELEPHONE ENCOUNTER
Patient needs her right knee drained and possibly a shot  Ok to schedule with Ava Dumont today or tomorrow ?      Pt # [phone]

## 2023-01-26 ENCOUNTER — OFFICE VISIT (OUTPATIENT)
Dept: FAMILY MEDICINE CLINIC | Facility: CLINIC | Age: 73
End: 2023-01-26

## 2023-01-26 VITALS
HEART RATE: 88 BPM | TEMPERATURE: 97.5 F | SYSTOLIC BLOOD PRESSURE: 100 MMHG | WEIGHT: 172 LBS | HEIGHT: 60 IN | DIASTOLIC BLOOD PRESSURE: 70 MMHG | BODY MASS INDEX: 33.77 KG/M2 | OXYGEN SATURATION: 97 %

## 2023-01-26 DIAGNOSIS — M25.561 CHRONIC PAIN OF RIGHT KNEE: Primary | ICD-10-CM

## 2023-01-26 DIAGNOSIS — G89.29 CHRONIC PAIN OF RIGHT KNEE: Primary | ICD-10-CM

## 2023-01-26 RX ORDER — METHYLPREDNISOLONE ACETATE 80 MG/ML
1 INJECTION, SUSPENSION INTRA-ARTICULAR; INTRALESIONAL; INTRAMUSCULAR; SOFT TISSUE
Status: COMPLETED | OUTPATIENT
Start: 2023-01-26 | End: 2023-01-26

## 2023-01-26 RX ORDER — LIDOCAINE HYDROCHLORIDE 10 MG/ML
4 INJECTION, SOLUTION INFILTRATION; PERINEURAL
Status: COMPLETED | OUTPATIENT
Start: 2023-01-26 | End: 2023-01-26

## 2023-01-26 RX ADMIN — LIDOCAINE HYDROCHLORIDE 4 ML: 10 INJECTION, SOLUTION INFILTRATION; PERINEURAL at 14:37

## 2023-01-26 RX ADMIN — METHYLPREDNISOLONE ACETATE 1 ML: 80 INJECTION, SUSPENSION INTRA-ARTICULAR; INTRALESIONAL; INTRAMUSCULAR; SOFT TISSUE at 14:37

## 2023-01-26 NOTE — PROGRESS NOTES
Name: Elise Ying      : 1950      MRN: [id number]  Encounter Provider: RAY Lopez  Encounter Date: 2023   Encounter department: [address] [place] [address]     1  Chronic pain of right knee  -     Large joint arthrocentesis: R knee  -     lidocaine (XYLOCAINE) 1 % injection 4 mL  -     methylPREDNISolone acetate (DEPO-MEDROL) injection 1 mL         Subjective      72 y  o female presenting with right knee pain for the past 10 days  She reports that she knows that she has a Baker's cyst on posterior right knee but current pain feels different  She most likely has pain secondary to osteoarthritis  She denies trauma to the knee  She stands all day long for her job  She feels the knee is slightly swollen on the medial aspect and needs to have it drained and possible injection for pain  Her last knee injection was on 10/15/2021  Review of Systems   Constitutional: Negative  Respiratory: Negative  Cardiovascular: Negative  Musculoskeletal: Positive for arthralgias, gait problem, joint swelling and myalgias  Skin: Negative  Neurological: Negative for weakness and numbness  Psychiatric/Behavioral: Negative  Current Outpatient Medications on File Prior to Visit   Medication Sig   • cholecalciferol (VITAMIN D3) 1,000 units tablet Take 1 tablet (1,000 Units total) by mouth daily   • levothyroxine 112 mcg tablet Take 1 tablet (112 mcg total) by mouth daily   • varenicline 0 5 MG X 11 & 1 MG X 42 tablet therapy pack Take as directed per pack (Patient not taking: Reported on 2023)       Objective     /70 (BP Location: Left arm, Patient Position: Sitting, Cuff Size: Standard)   Pulse 88   Temp 97 5 °F (36 4 °C)   Ht 5' (1 524 m)   Wt 78 kg (172 lb)   SpO2 97%   BMI 33 59 kg/m² (Reviewed)    Physical Exam  Vitals reviewed  Constitutional:       General: She is not in acute distress  Appearance: She is not ill-appearing  HENT:      Head: Normocephalic and atraumatic  Eyes:      Extraocular Movements: Extraocular movements intact  Conjunctiva/sclera: Conjunctivae normal       Pupils: Pupils are equal, round, and reactive to light  Cardiovascular:      Rate and Rhythm: Normal rate and regular rhythm  Pulses:           Dorsalis pedis pulses are 2+ on the right side and 2+ on the left side  Posterior tibial pulses are 2+ on the right side and 2+ on the left side  Heart sounds: Normal heart sounds  Pulmonary:      Effort: Pulmonary effort is normal       Breath sounds: Normal breath sounds  Musculoskeletal:         General: Tenderness present  Right knee: Effusion present  Tenderness present over the medial joint line  Comments: R knee with trace effusion  Small posterior Baker's cyst  Ligaments intact  Skin:     General: Skin is warm and dry  Capillary Refill: Capillary refill takes less than 2 seconds  Neurological:      General: No focal deficit present  Mental Status: She is alert and oriented to person, place, and time  Motor: Motor function is intact  Gait: Gait abnormal ( antalgic)  Deep Tendon Reflexes: Reflexes are normal and symmetric  Large joint arthrocentesis: R knee  Universal Protocol:  Consent: Verbal consent obtained  Written consent obtained  Risks and benefits: risks, benefits and alternatives were discussed  Consent given by: patient  Time out: Immediately prior to procedure a "time out" was called to verify the correct patient, procedure, equipment, support staff and site/side marked as required    Timeout called at: 1/26/2023 1:10 PM   Patient understanding: patient states understanding of the procedure being performed  Patient consent: the patient's understanding of the procedure matches consent given  Procedure consent: procedure consent matches procedure scheduled  Site marked: the operative site was marked  Patient identity confirmed: verbally with patient    Supporting Documentation  Indications: pain   Procedure Details  Location: knee - R knee  Needle size: 25 G  Ultrasound guidance: no  Approach: lateral  Medications administered: 4 mL lidocaine 1 %; 1 mL methylPREDNISolone acetate 80 mg/mL    Aspirate amount: 2 mL  Aspirate: serous    Patient tolerance: patient tolerated the procedure well with no immediate complications  Dressing:  Sterile dressing applied        Charmayne Rams, CRNP

## 2023-06-09 ENCOUNTER — OFFICE VISIT (OUTPATIENT)
Dept: FAMILY MEDICINE CLINIC | Facility: CLINIC | Age: 73
End: 2023-06-09
Payer: MEDICARE

## 2023-06-09 ENCOUNTER — TELEPHONE (OUTPATIENT)
Dept: FAMILY MEDICINE CLINIC | Facility: CLINIC | Age: 73
End: 2023-06-09

## 2023-06-09 ENCOUNTER — APPOINTMENT (OUTPATIENT)
Dept: LAB | Facility: CLINIC | Age: 73
End: 2023-06-09
Payer: MEDICARE

## 2023-06-09 VITALS
HEIGHT: 62 IN | HEART RATE: 99 BPM | DIASTOLIC BLOOD PRESSURE: 90 MMHG | BODY MASS INDEX: 30.73 KG/M2 | TEMPERATURE: 97.5 F | WEIGHT: 167 LBS | OXYGEN SATURATION: 96 % | SYSTOLIC BLOOD PRESSURE: 160 MMHG

## 2023-06-09 DIAGNOSIS — Z13.89 SCREENING FOR CARDIOVASCULAR, RESPIRATORY, AND GENITOURINARY DISEASES: ICD-10-CM

## 2023-06-09 DIAGNOSIS — Z13.6 SCREENING FOR CARDIOVASCULAR, RESPIRATORY, AND GENITOURINARY DISEASES: ICD-10-CM

## 2023-06-09 DIAGNOSIS — E03.9 PRIMARY HYPOTHYROIDISM: Chronic | ICD-10-CM

## 2023-06-09 DIAGNOSIS — Z00.00 MEDICARE ANNUAL WELLNESS VISIT, SUBSEQUENT: Primary | ICD-10-CM

## 2023-06-09 DIAGNOSIS — R09.89: ICD-10-CM

## 2023-06-09 DIAGNOSIS — Z13.83 SCREENING FOR CARDIOVASCULAR, RESPIRATORY, AND GENITOURINARY DISEASES: ICD-10-CM

## 2023-06-09 DIAGNOSIS — Z72.0 TOBACCO ABUSE: ICD-10-CM

## 2023-06-09 DIAGNOSIS — M35.3 PMR (POLYMYALGIA RHEUMATICA) (HCC): ICD-10-CM

## 2023-06-09 LAB
ALBUMIN SERPL BCP-MCNC: 3.9 G/DL (ref 3.5–5)
ALP SERPL-CCNC: 88 U/L (ref 46–116)
ALT SERPL W P-5'-P-CCNC: 37 U/L (ref 12–78)
ANION GAP SERPL CALCULATED.3IONS-SCNC: 1 MMOL/L (ref 4–13)
AST SERPL W P-5'-P-CCNC: 26 U/L (ref 5–45)
BASOPHILS # BLD AUTO: 0.12 THOUSANDS/ÂΜL (ref 0–0.1)
BASOPHILS NFR BLD AUTO: 1 % (ref 0–1)
BILIRUB SERPL-MCNC: 0.37 MG/DL (ref 0.2–1)
BUN SERPL-MCNC: 24 MG/DL (ref 5–25)
CALCIUM SERPL-MCNC: 10 MG/DL (ref 8.3–10.1)
CHLORIDE SERPL-SCNC: 111 MMOL/L (ref 96–108)
CHOLEST SERPL-MCNC: 200 MG/DL
CO2 SERPL-SCNC: 27 MMOL/L (ref 21–32)
CREAT SERPL-MCNC: 1.1 MG/DL (ref 0.6–1.3)
CRP SERPL QL: 3.2 MG/L
EOSINOPHIL # BLD AUTO: 0.24 THOUSAND/ÂΜL (ref 0–0.61)
EOSINOPHIL NFR BLD AUTO: 3 % (ref 0–6)
ERYTHROCYTE [DISTWIDTH] IN BLOOD BY AUTOMATED COUNT: 13.9 % (ref 11.6–15.1)
ERYTHROCYTE [SEDIMENTATION RATE] IN BLOOD: 21 MM/HOUR (ref 0–29)
GFR SERPL CREATININE-BSD FRML MDRD: 50 ML/MIN/1.73SQ M
GLUCOSE P FAST SERPL-MCNC: 106 MG/DL (ref 65–99)
HCT VFR BLD AUTO: 51.2 % (ref 34.8–46.1)
HDLC SERPL-MCNC: 46 MG/DL
HGB BLD-MCNC: 16.1 G/DL (ref 11.5–15.4)
IMM GRANULOCYTES # BLD AUTO: 0.04 THOUSAND/UL (ref 0–0.2)
IMM GRANULOCYTES NFR BLD AUTO: 0 % (ref 0–2)
LDLC SERPL CALC-MCNC: 131 MG/DL (ref 0–100)
LYMPHOCYTES # BLD AUTO: 2.61 THOUSANDS/ÂΜL (ref 0.6–4.47)
LYMPHOCYTES NFR BLD AUTO: 29 % (ref 14–44)
MCH RBC QN AUTO: 32 PG (ref 26.8–34.3)
MCHC RBC AUTO-ENTMCNC: 31.4 G/DL (ref 31.4–37.4)
MCV RBC AUTO: 102 FL (ref 82–98)
MONOCYTES # BLD AUTO: 0.81 THOUSAND/ÂΜL (ref 0.17–1.22)
MONOCYTES NFR BLD AUTO: 9 % (ref 4–12)
NEUTROPHILS # BLD AUTO: 5.23 THOUSANDS/ÂΜL (ref 1.85–7.62)
NEUTS SEG NFR BLD AUTO: 58 % (ref 43–75)
NONHDLC SERPL-MCNC: 154 MG/DL
NRBC BLD AUTO-RTO: 0 /100 WBCS
PLATELET # BLD AUTO: 244 THOUSANDS/UL (ref 149–390)
PMV BLD AUTO: 13.2 FL (ref 8.9–12.7)
POTASSIUM SERPL-SCNC: 5.2 MMOL/L (ref 3.5–5.3)
PROT SERPL-MCNC: 8.5 G/DL (ref 6.4–8.4)
RBC # BLD AUTO: 5.03 MILLION/UL (ref 3.81–5.12)
SODIUM SERPL-SCNC: 139 MMOL/L (ref 135–147)
TRIGL SERPL-MCNC: 113 MG/DL
TSH SERPL DL<=0.05 MIU/L-ACNC: 2.41 UIU/ML (ref 0.45–4.5)
WBC # BLD AUTO: 9.05 THOUSAND/UL (ref 4.31–10.16)

## 2023-06-09 PROCEDURE — 80053 COMPREHEN METABOLIC PANEL: CPT

## 2023-06-09 PROCEDURE — 36415 COLL VENOUS BLD VENIPUNCTURE: CPT

## 2023-06-09 PROCEDURE — 80061 LIPID PANEL: CPT

## 2023-06-09 PROCEDURE — G0442 ANNUAL ALCOHOL SCREEN 15 MIN: HCPCS | Performed by: FAMILY MEDICINE

## 2023-06-09 PROCEDURE — 86140 C-REACTIVE PROTEIN: CPT

## 2023-06-09 PROCEDURE — G0439 PPPS, SUBSEQ VISIT: HCPCS | Performed by: FAMILY MEDICINE

## 2023-06-09 PROCEDURE — 85025 COMPLETE CBC W/AUTO DIFF WBC: CPT

## 2023-06-09 PROCEDURE — 85652 RBC SED RATE AUTOMATED: CPT

## 2023-06-09 PROCEDURE — 84443 ASSAY THYROID STIM HORMONE: CPT

## 2023-06-09 PROCEDURE — 99214 OFFICE O/P EST MOD 30 MIN: CPT | Performed by: FAMILY MEDICINE

## 2023-06-09 PROCEDURE — G0444 DEPRESSION SCREEN ANNUAL: HCPCS | Performed by: FAMILY MEDICINE

## 2023-06-09 NOTE — PROGRESS NOTES
Assessment and Plan:     Problem List Items Addressed This Visit        Endocrine    Primary hypothyroidism (Chronic)     Appears to be stable clinically  Check TSH  Adjust meds if TSH is not at goal  Recheck 6m           Relevant Orders    TSH, 3rd generation       Other    Absent pulse in one limb     Suspect subclavian stenosis  BP is elevated in R arm, but will not address until we get results of L arm arterial duplex  Recheck 4-6w - earlier if she develops L arm symptoms  Relevant Orders    VAS upper limb arterial duplex, unilateral/limited, graft    CBC and differential    Comprehensive metabolic panel    Lipid panel    PMR (polymyalgia rheumatica) (HCC)     No muscle tenderness on exam  Pt is off pred  Check CBC, CRP and ESR  Recheck 6m         Relevant Orders    CBC and differential    Comprehensive metabolic panel    Sedimentation rate, automated    C-reactive protein    Tobacco abuse     Counseled  Pt refuses CT of chest and is not interested in other smoking cessation treatments at present  Will Recheck 6m        Other Visit Diagnoses     Medicare annual wellness visit, subsequent    -  Primary    Screening for cardiovascular, respiratory, and genitourinary diseases        Relevant Orders    Lipid panel           Preventive health issues were discussed with patient, and age appropriate screening tests were ordered as noted in patient's After Visit Summary  Personalized health advice and appropriate referrals for health education or preventive services given if needed, as noted in patient's After Visit Summary  History of Present Illness:     Patient presents for a Medicare Wellness Visit    f/u multiple med issues and AWV  - pt feels well  - knee pain is stable as long as she avoids steps  - BP taken at home have been low  Pt notes that she has trouble getting BP from her L arm at times - taken at home and at work  BP yesterday from her R arm was 110/70  - pt started smoking again   States that Wellbutrin caused road rage and worsening mood  Pt is off meds and started smoking again  - pt does not exercise but does a lot of lifting at work  Pt denies CP, palpitations, lightheadedness or other CV symptoms with or without exertion  - pt denies any new GI or  issues  - still refuses to have mammo and dexa  - AWV           Patient Care Team:  Saw Hutson MD as PCP - General     Review of Systems:     Review of Systems   Constitutional: Negative  HENT: Negative  Eyes: Negative  Respiratory: Negative  Cardiovascular: Negative  Gastrointestinal: Negative  Endocrine: Negative  Genitourinary: Negative  Musculoskeletal: Positive for arthralgias  Negative for back pain, gait problem, joint swelling, myalgias, neck pain and neck stiffness  Skin: Negative  Allergic/Immunologic: Negative  Neurological: Negative  Hematological: Negative  Psychiatric/Behavioral: Negative           Problem List:     Patient Active Problem List   Diagnosis   • Atypical migraine   • Primary hypothyroidism   • Screening for cervical cancer   • White coat syndrome with high blood pressure but without hypertension   • PMR (polymyalgia rheumatica) (HCC)   • Urinary frequency   • Adult BMI 31 0-31 9 kg/sq m   • Tobacco abuse   • Peripheral edema   • Nocturia   • Absent pulse in one limb      Past Medical and Surgical History:     Past Medical History:   Diagnosis Date   • Disease of thyroid gland    • Polymyalgia rheumatica (HCC)      Past Surgical History:   Procedure Laterality Date   • CHOLECYSTECTOMY     • FOOT SURGERY     • GALLBLADDER SURGERY     • SHOULDER ARTHROSCOPY Right       Family History:     Family History   Problem Relation Age of Onset   • Heart disease Mother    • Diabetes Mother    • Hypertension Mother    • Breast cancer Sister    • Colon cancer Brother    • Heart disease Brother    • Diabetes Brother    • Hypertension Brother       Social History:     Social History     Socioeconomic History   • Marital status: Single     Spouse name: None   • Number of children: 4   • Years of education: HIGH SCHOOL GRADUATE    • Highest education level: None   Occupational History   • Occupation: RETIRED    Tobacco Use   • Smoking status: Every Day     Packs/day: 0 50     Years: 53 00     Total pack years: 26 50     Types: Cigarettes     Passive exposure: Current   • Smokeless tobacco: Never   Vaping Use   • Vaping Use: Never used   Substance and Sexual Activity   • Alcohol use: No   • Drug use: No   • Sexual activity: Not Currently   Other Topics Concern   • None   Social History Narrative    Always uses seat belt    Daily coffee consumption    Denied: history of daily cola consumption    Denied: history of daily tea consumption    Exercise: walking    No guns in the home    No living will    No Denominational beliefs    Water intake, adequate      Social Determinants of Health     Financial Resource Strain: Low Risk  (6/9/2023)    Overall Financial Resource Strain (CARDIA)    • Difficulty of Paying Living Expenses: Not hard at all   Food Insecurity: Not on file   Transportation Needs: No Transportation Needs (6/9/2023)    PRAPARE - Transportation    • Lack of Transportation (Medical): No    • Lack of Transportation (Non-Medical): No   Physical Activity: Not on file   Stress: Not on file   Social Connections: Not on file   Intimate Partner Violence: Not on file   Housing Stability: Not on file      Medications and Allergies:     Current Outpatient Medications   Medication Sig Dispense Refill   • cholecalciferol (VITAMIN D3) 1,000 units tablet Take 1 tablet (1,000 Units total) by mouth daily 90 tablet 3   • levothyroxine 112 mcg tablet Take 1 tablet (112 mcg total) by mouth daily 90 tablet 1     No current facility-administered medications for this visit       Allergies   Allergen Reactions   • Bee Venom    • Naproxen Hives   • Penicillins Hives   • Vicodin  [Hydrocodone-Acetaminophen] Hives and Itching   • Other Rash     Spider bite       Immunizations:     Immunization History   Administered Date(s) Administered   • COVID-19 MODERNA VACC 0 5 ML IM 01/22/2021, 02/18/2021   • INFLUENZA 10/16/2017, 10/31/2018, 08/17/2020, 10/14/2021, 11/13/2022   • Influenza Quadrivalent, 6-35 Months IM 10/01/2014   • Influenza, high dose seasonal 0 7 mL 08/17/2020   • Influenza, seasonal, injectable 01/28/2014   • Pneumococcal Conjugate 13-Valent 10/16/2017   • Pneumococcal Polysaccharide PPV23 10/01/2014, 10/31/2018      Health Maintenance:         Topic Date Due   • Breast Cancer Screening: Mammogram  06/19/2019   • Lung Cancer Screening  05/12/2021   • DXA SCAN  05/12/2023   • Colorectal Cancer Screening  12/16/2025   • Hepatitis C Screening  Completed         Topic Date Due   • COVID-19 Vaccine (3 - [place] [facility]) 04/15/2021      Medicare Screening Tests and Risk Assessments:     Jesus Cavazos is here for her Subsequent Wellness visit  Health Risk Assessment:   Patient rates overall health as excellent  Patient feels that their physical health rating is much better  Patient is very satisfied with their life  Eyesight was rated as same  Hearing was rated as same  Patient feels that their emotional and mental health rating is same  Patients states they are never, rarely angry  Patient states they are never, rarely unusually tired/fatigued  Pain experienced in the last 7 days has been none  Patient states that she has experienced no weight loss or gain in last 6 months  Depression Screening:   PHQ-2 Score: 0      Fall Risk Screening: In the past year, patient has experienced: no history of falling in past year      Urinary Incontinence Screening:   Patient has not leaked urine accidently in the last six months  Home Safety:  Patient has trouble with stairs inside or outside of their home  Patient has working smoke alarms and has working carbon monoxide detector  Home safety hazards include: none  Nutrition:   Current diet is Regular  Medications:   Patient is currently taking over-the-counter supplements  OTC medications include: see medication list  Patient is able to manage medications  Activities of Daily Living (ADLs)/Instrumental Activities of Daily Living (IADLs):   Walk and transfer into and out of bed and chair?: Yes  Dress and groom yourself?: Yes    Bathe or shower yourself?: Yes    Feed yourself?  Yes  Do your laundry/housekeeping?: Yes  Manage your money, pay your bills and track your expenses?: Yes  Make your own meals?: Yes    Do your own shopping?: Yes    Previous Hospitalizations:   Any hospitalizations or ED visits within the last 12 months?: No      Advance Care Planning:   Living will: No    Durable POA for healthcare: No    Advanced directive: No    Advanced directive counseling given: Yes    Five wishes given: Yes      Cognitive Screening:   Provider or family/friend/caregiver concerned regarding cognition?: No    PREVENTIVE SCREENINGS      Cardiovascular Screening:    General: Screening Current      Diabetes Screening:     General: Screening Current      Colorectal Cancer Screening:     General: Screening Current      Breast Cancer Screening:     General: Patient Declines      Cervical Cancer Screening:    General: Screening Not Indicated      Osteoporosis Screening:    General: Screening Current      Abdominal Aortic Aneurysm (AAA) Screening:        General: Screening Not Indicated      Lung Cancer Screening:     General: Patient Declines      Hepatitis C Screening:    General: Screening Current    Screening, Brief Intervention, and Referral to Treatment (SBIRT)    Screening    Typical number of drinks in a week: 0    AUDIT-C Screenin) How often did you have a drink containing alcohol in the past year? never  2) How many drinks did you have on a typical day when you were drinking in the past year? 0  3) How often did you have 6 or more drinks on one occasion in the past "year? never    AUDIT-C Score: 0  Interpretation: Score 0-2 (female): Negative screen for alcohol misuse    Single Item Drug Screening:  How often have you used an illegal drug (including marijuana) or a prescription medication for non-medical reasons in the past year? never    Single Item Drug Screen Score: 0  Interpretation: Negative screen for possible drug use disorder    Time Spent  Time spent screening/evaluating the patient for alcohol misuse: 5 minutes  Annual Depression Screening  Time spent screening and evaluating the patient for depression during today's encounter was 5 minutes  Other Counseling Topics:   Calcium and vitamin D intake and regular weightbearing exercise  No results found  Physical Exam:     /90 (BP Location: Right arm, Patient Position: Sitting)   Pulse 99   Temp 97 5 °F (36 4 °C)   Ht 5' 2 28\" (1 582 m)   Wt 75 8 kg (167 lb)   SpO2 96%   BMI 30 27 kg/m²     Physical Exam  Vitals reviewed  Constitutional:       Appearance: She is well-developed  She is not diaphoretic  HENT:      Head: Normocephalic and atraumatic  Right Ear: Tympanic membrane, ear canal and external ear normal       Left Ear: Tympanic membrane, ear canal and external ear normal       Nose: Nose normal       Mouth/Throat:      Mouth: Mucous membranes are moist       Pharynx: No oropharyngeal exudate  Eyes:      Extraocular Movements: Extraocular movements intact  Conjunctiva/sclera: Conjunctivae normal       Pupils: Pupils are equal, round, and reactive to light  Neck:      Thyroid: No thyromegaly  Vascular: No carotid bruit or JVD  Cardiovascular:      Rate and Rhythm: Normal rate and regular rhythm  Heart sounds: Normal heart sounds  No murmur heard  Comments: Absent L radial and antecubital pulses  Arm warm  Pulmonary:      Effort: Pulmonary effort is normal       Breath sounds: Normal breath sounds  Abdominal:      General: There is no distension        " Palpations: Abdomen is soft  There is no mass  Tenderness: There is no abdominal tenderness  Musculoskeletal:         General: Deformity (mild diffuse OA changes) present  No swelling or tenderness  Cervical back: Normal range of motion and neck supple  No tenderness  Right lower leg: Edema (trace) present  Left lower leg: Edema (trace) present  Lymphadenopathy:      Cervical: No cervical adenopathy  Skin:     General: Skin is warm and dry  Neurological:      General: No focal deficit present  Mental Status: She is alert and oriented to person, place, and time  Cranial Nerves: No cranial nerve deficit  Sensory: No sensory deficit  Motor: No weakness or abnormal muscle tone  Gait: Gait normal       Deep Tendon Reflexes: Reflexes are normal and symmetric  Comments: [employer] 4/5   Psychiatric:         Mood and Affect: Mood normal          Behavior: Behavior normal          Thought Content:  Thought content normal          Judgment: Judgment normal       Comments: PHQ-2/9 Depression Screening    Little interest or pleasure in doing things: 0 - not at all  Feeling down, depressed, or hopeless: 0 - not at all  PHQ-2 Score: 0  PHQ-2 Interpretation: Negative depression screen          Aarti Melgar MD

## 2023-06-09 NOTE — ASSESSMENT & PLAN NOTE
Counseled  Pt refuses CT of chest and is not interested in other smoking cessation treatments at present   Will Recheck 6m

## 2023-06-09 NOTE — PATIENT INSTRUCTIONS
Medicare Preventive Visit Patient Instructions  Thank you for completing your Welcome to Medicare Visit or Medicare Annual Wellness Visit today  Your next wellness visit will be due in one year (6/9/2024)  The screening/preventive services that you may require over the next 5-10 years are detailed below  Some tests may not apply to you based off risk factors and/or age  Screening tests ordered at today's visit but not completed yet may show as past due  Also, please note that scanned in results may not display below  Preventive Screenings:  Service Recommendations Previous Testing/Comments   Colorectal Cancer Screening  * Colonoscopy    * Fecal Occult Blood Test (FOBT)/Fecal Immunochemical Test (FIT)  * Fecal DNA/Cologuard Test  * Flexible Sigmoidoscopy Age: 39-70 years old   Colonoscopy: every 10 years (may be performed more frequently if at higher risk)  OR  FOBT/FIT: every 1 year  OR  Cologuard: every 3 years  OR  Sigmoidoscopy: every 5 years  Screening may be recommended earlier than age 39 if at higher risk for colorectal cancer  Also, an individualized decision between you and your healthcare provider will decide whether screening between the ages of 74-80 would be appropriate  Colonoscopy: 12/16/2020  FOBT/FIT: Not on file  Cologuard: 08/20/2020  Sigmoidoscopy: Not on file    Screening Current     Breast Cancer Screening Age: 36 years old  Frequency: every 1-2 years  Not required if history of left and right mastectomy Mammogram: 06/19/2018        Cervical Cancer Screening Between the ages of 21-29, pap smear recommended once every 3 years  Between the ages of 33-67, can perform pap smear with HPV co-testing every 5 years     Recommendations may differ for women with a history of total hysterectomy, cervical cancer, or abnormal pap smears in past  Pap Smear: 06/13/2018    Screening Not Indicated   Hepatitis C Screening Once for adults born between 1945 and 1965  More frequently in patients at high risk for Hepatitis C Hep C Antibody: 06/22/2018    Screening Current   Diabetes Screening 1-2 times per year if you're at risk for diabetes or have pre-diabetes Fasting glucose: 101 mg/dL (11/23/2022)  A1C: No results in last 5 years (No results in last 5 years)  Screening Current   Cholesterol Screening Once every 5 years if you don't have a lipid disorder  May order more often based on risk factors  Lipid panel: 11/23/2022    Screening Current     Other Preventive Screenings Covered by Medicare:  1  Abdominal Aortic Aneurysm (AAA) Screening: covered once if your at risk  You're considered to be at risk if you have a family history of AAA  2  Lung Cancer Screening: covers low dose CT scan once per year if you meet all of the following conditions: (1) Age 50-69; (2) No signs or symptoms of lung cancer; (3) Current smoker or have quit smoking within the last 15 years; (4) You have a tobacco smoking history of at least 20 pack years (packs per day multiplied by number of years you smoked); (5) You get a written order from a healthcare provider  3  Glaucoma Screening: covered annually if you're considered high risk: (1) You have diabetes OR (2) Family history of glaucoma OR (3)  aged 48 and older OR (3)  American aged 72 and older  3  Osteoporosis Screening: covered every 2 years if you meet one of the following conditions: (1) You're estrogen deficient and at risk for osteoporosis based off medical history and other findings; (2) Have a vertebral abnormality; (3) On glucocorticoid therapy for more than 3 months; (4) Have primary hyperparathyroidism; (5) On osteoporosis medications and need to assess response to drug therapy  · Last bone density test (DXA Scan): 05/12/2020   5  HIV Screening: covered annually if you're between the age of 15-65  Also covered annually if you are younger than 13 and older than 72 with risk factors for HIV infection   For pregnant patients, it is covered up to 3 times per pregnancy  Immunizations:  Immunization Recommendations   Influenza Vaccine Annual influenza vaccination during flu season is recommended for all persons aged >= 6 months who do not have contraindications   Pneumococcal Vaccine   * Pneumococcal conjugate vaccine = PCV13 (Prevnar 13), PCV15 (Vaxneuvance), PCV20 (Prevnar 20)  * Pneumococcal polysaccharide vaccine = PPSV23 (Pneumovax) Adults 25-60 years old: 1-3 doses may be recommended based on certain risk factors  Adults 72 years old: 1-2 doses may be recommended based off what pneumonia vaccine you previously received   Hepatitis B Vaccine 3 dose series if at intermediate or high risk (ex: diabetes, end stage renal disease, liver disease)   Tetanus (Td) Vaccine - COST NOT COVERED BY MEDICARE PART B Following completion of primary series, a booster dose should be given every 10 years to maintain immunity against tetanus  Td may also be given as tetanus wound prophylaxis  Tdap Vaccine - COST NOT COVERED BY MEDICARE PART B Recommended at least once for all adults  For pregnant patients, recommended with each pregnancy  Shingles Vaccine (Shingrix) - COST NOT COVERED BY MEDICARE PART B  2 shot series recommended in those aged 48 and above     Health Maintenance Due:      Topic Date Due   • Breast Cancer Screening: Mammogram  06/19/2019   • Lung Cancer Screening  05/12/2021   • DXA SCAN  05/12/2023   • Colorectal Cancer Screening  12/16/2023   • Hepatitis C Screening  Completed     Immunizations Due:      Topic Date Due   • COVID-19 Vaccine (3 - Manuela Wright series) 04/15/2021     Advance Directives   What are advance directives? Advance directives are legal documents that state your wishes and plans for medical care  These plans are made ahead of time in case you lose your ability to make decisions for yourself  Advance directives can apply to any medical decision, such as the treatments you want, and if you want to donate organs     What are the types of advance directives? There are many types of advance directives, and each state has rules about how to use them  You may choose a combination of any of the following:  · Living will: This is a written record of the treatment you want  You can also choose which treatments you do not want, which to limit, and which to stop at a certain time  This includes surgery, medicine, IV fluid, and tube feedings  · Durable power of  for healthcare [facility]): This is a written record that states who you want to make healthcare choices for you when you are unable to make them for yourself  This person, called a proxy, is usually a family member or a friend  You may choose more than 1 proxy  · Do not resuscitate (DNR) order:  A DNR order is used in case your heart stops beating or you stop breathing  It is a request not to have certain forms of treatment, such as CPR  A DNR order may be included in other types of advance directives  · Medical directive: This covers the care that you want if you are in a coma, near death, or unable to make decisions for yourself  You can list the treatments you want for each condition  Treatment may include pain medicine, surgery, blood transfusions, dialysis, IV or tube feedings, and a ventilator (breathing machine)  · Values history: This document has questions about your views, beliefs, and how you feel and think about life  This information can help others choose the care that you would choose  Why are advance directives important? An advance directive helps you control your care  Although spoken wishes may be used, it is better to have your wishes written down  Spoken wishes can be misunderstood, or not followed  Treatments may be given even if you do not want them  An advance directive may make it easier for your family to make difficult choices about your care  Urinary Incontinence   Urinary incontinence (UI)  is when you lose control of your bladder   UI develops because your bladder cannot store or empty urine properly  The 3 most common types of UI are stress incontinence, urge incontinence, or both  Medicines:   · May be given to help strengthen your bladder control  Report any side effects of medication to your healthcare provider  Do pelvic muscle exercises often:  Your pelvic muscles help you stop urinating  Squeeze these muscles tight for 5 seconds, then relax for 5 seconds  Gradually work up to squeezing for 10 seconds  Do 3 sets of 15 repetitions a day, or as directed  This will help strengthen your pelvic muscles and improve bladder control  Train your bladder:  Go to the bathroom at set times, such as every 2 hours, even if you do not feel the urge to go  You can also try to hold your urine when you feel the urge to go  For example, hold your urine for 5 minutes when you feel the urge to go  As that becomes easier, hold your urine for 10 minutes  Self-care:   · Keep a UI record  Write down how often you leak urine and how much you leak  Make a note of what you were doing when you leaked urine  · Drink liquids as directed  You may need to limit the amount of liquid you drink to help control your urine leakage  Do not drink any liquid right before you go to bed  Limit or do not have drinks that contain caffeine or alcohol  · Prevent constipation  Eat a variety of high-fiber foods  Good examples are high-fiber cereals, beans, vegetables, and whole-grain breads  Walking is the best way to trigger your intestines to have a bowel movement  · Exercise regularly and maintain a healthy weight  Weight loss and exercise will decrease pressure on your bladder and help you control your leakage  · Use a catheter as directed  to help empty your bladder  A catheter is a tiny, plastic tube that is put into your bladder to drain your urine  · Go to behavior therapy as directed  Behavior therapy may be used to help you learn to control your urge to urinate      Cigarette Smoking and Your Health   Risks to your health if you smoke:  Nicotine and other chemicals found in tobacco damage every cell in your body  Even if you are a light smoker, you have an increased risk for cancer, heart disease, and lung disease  If you are pregnant or have diabetes, smoking increases your risk for complications  Benefits to your health if you stop smoking:   · You decrease respiratory symptoms such as coughing, wheezing, and shortness of breath  · You reduce your risk for cancers of the lung, mouth, throat, kidney, bladder, pancreas, stomach, and cervix  If you already have cancer, you increase the benefits of chemotherapy  You also reduce your risk for cancer returning or a second cancer from developing  · You reduce your risk for heart disease, blood clots, heart attack, and stroke  · You reduce your risk for lung infections, and diseases such as pneumonia, asthma, chronic bronchitis, and emphysema  · Your circulation improves  More oxygen can be delivered to your body  If you have diabetes, you lower your risk for complications, such as kidney, artery, and eye diseases  You also lower your risk for nerve damage  Nerve damage can lead to amputations, poor vision, and blindness  · You improve your body's ability to heal and to fight infections  For more information and support to stop smoking:   · [employer]  Phone: 9- 826 - 737-6828  Web Address: www [employer]  Weight Management   Why it is important to manage your weight:  Being overweight increases your risk of health conditions such as heart disease, high blood pressure, type 2 diabetes, and certain types of cancer  It can also increase your risk for osteoarthritis, sleep apnea, and other respiratory problems  Aim for a slow, steady weight loss  Even a small amount of weight loss can lower your risk of health problems  How to lose weight safely:  A safe and healthy way to lose weight is to eat fewer calories and get regular exercise  You can lose up about 1 pound a week by decreasing the number of calories you eat by 500 calories each day  Healthy meal plan for weight management:  A healthy meal plan includes a variety of foods, contains fewer calories, and helps you stay healthy  A healthy meal plan includes the following:  · Eat whole-grain foods more often  A healthy meal plan should contain fiber  Fiber is the part of grains, fruits, and vegetables that is not broken down by your body  Whole-grain foods are healthy and provide extra fiber in your diet  Some examples of whole-grain foods are whole-wheat breads and pastas, oatmeal, brown rice, and bulgur  · Eat a variety of vegetables every day  Include dark, leafy greens such as spinach, kale, lima greens, and mustard greens  Eat yellow and orange vegetables such as carrots, sweet potatoes, and winter squash  · Eat a variety of fruits every day  Choose fresh or canned fruit (canned in its own juice or light syrup) instead of juice  Fruit juice has very little or no fiber  · Eat low-fat dairy foods  Drink fat-free (skim) milk or 1% milk  Eat fat-free yogurt and low-fat cottage cheese  Try low-fat cheeses such as mozzarella and other reduced-fat cheeses  · Choose meat and other protein foods that are low in fat  Choose beans or other legumes such as split peas or lentils  Choose fish, skinless poultry (chicken or turkey), or lean cuts of red meat (beef or pork)  Before you cook meat or poultry, cut off any visible fat  · Use less fat and oil  Try baking foods instead of frying them  Add less fat, such as margarine, sour cream, regular salad dressing and mayonnaise to foods  Eat fewer high-fat foods  Some examples of high-fat foods include french fries, doughnuts, ice cream, and cakes  · Eat fewer sweets  Limit foods and drinks that are high in sugar  This includes candy, cookies, regular soda, and sweetened drinks    Exercise:  Exercise at least 30 minutes per day on most days of the week  Some examples of exercise include walking, biking, dancing, and swimming  You can also fit in more physical activity by taking the stairs instead of the elevator or parking farther away from stores  Ask your healthcare provider about the best exercise plan for you  © Copyright [employer] 2018 Information is for End User's use only and may not be sold, redistributed or otherwise used for commercial purposes   All illustrations and images included in CareNotes® are the copyrighted property of A [employer] M [employer]  or [address] [place] [address] [facility]

## 2023-06-09 NOTE — ASSESSMENT & PLAN NOTE
Suspect subclavian stenosis  BP is elevated in R arm, but will not address until we get results of L arm arterial duplex  Recheck 4-6w - earlier if she develops L arm symptoms

## 2023-06-09 NOTE — TELEPHONE ENCOUNTER
Patient needs a 4 week f/u appointment with you    Can you please advise us where to put her on your schedule?

## 2023-06-16 ENCOUNTER — HOSPITAL ENCOUNTER (OUTPATIENT)
Dept: NON INVASIVE DIAGNOSTICS | Facility: HOSPITAL | Age: 73
Discharge: HOME | End: 2023-06-16
Attending: FAMILY MEDICINE
Payer: MEDICARE

## 2023-06-16 DIAGNOSIS — R09.89: ICD-10-CM

## 2023-06-16 PROCEDURE — 93922 UPR/L XTREMITY ART 2 LEVELS: CPT | Performed by: SURGERY

## 2023-06-16 PROCEDURE — 93931 UPPER EXTREMITY STUDY: CPT

## 2023-06-16 PROCEDURE — 93931 UPPER EXTREMITY STUDY: CPT | Performed by: SURGERY

## 2024-02-21 PROBLEM — Z12.4 SCREENING FOR CERVICAL CANCER: Status: RESOLVED | Noted: 2018-06-18 | Resolved: 2024-02-21

## 2024-04-08 DIAGNOSIS — E03.9 HYPOTHYROIDISM, UNSPECIFIED TYPE: ICD-10-CM

## 2024-04-08 RX ORDER — LEVOTHYROXINE SODIUM 112 UG/1
112 TABLET ORAL DAILY
Qty: 90 TABLET | Refills: 1 | Status: SHIPPED | OUTPATIENT
Start: 2024-04-08

## 2024-05-07 ENCOUNTER — NURSE TRIAGE (OUTPATIENT)
Age: 74
End: 2024-05-07

## 2024-05-07 ENCOUNTER — HOSPITAL ENCOUNTER (INPATIENT)
Facility: HOSPITAL | Age: 74
LOS: 1 days | Discharge: HOME | DRG: 305 | End: 2024-05-08
Attending: EMERGENCY MEDICINE | Admitting: INTERNAL MEDICINE
Payer: MEDICARE

## 2024-05-07 ENCOUNTER — APPOINTMENT (EMERGENCY)
Dept: RADIOLOGY | Facility: HOSPITAL | Age: 74
DRG: 305 | End: 2024-05-07
Payer: MEDICARE

## 2024-05-07 DIAGNOSIS — R79.89 ELEVATED TROPONIN: ICD-10-CM

## 2024-05-07 DIAGNOSIS — N17.9 AKI (ACUTE KIDNEY INJURY) (HCC): ICD-10-CM

## 2024-05-07 DIAGNOSIS — I16.1 HYPERTENSIVE EMERGENCY: Primary | ICD-10-CM

## 2024-05-07 DIAGNOSIS — I45.10 RIGHT BUNDLE BRANCH BLOCK: ICD-10-CM

## 2024-05-07 PROBLEM — I20.89 ATYPICAL ANGINA: Status: ACTIVE | Noted: 2024-05-07

## 2024-05-07 PROBLEM — R07.9 CHEST PAIN: Status: ACTIVE | Noted: 2024-05-07

## 2024-05-07 PROBLEM — I16.0 HYPERTENSIVE URGENCY: Status: ACTIVE | Noted: 2024-05-07

## 2024-05-07 LAB
2HR DELTA HS TROPONIN: -4 NG/L
4HR DELTA HS TROPONIN: -4 NG/L
ALBUMIN SERPL BCP-MCNC: 4.3 G/DL (ref 3.5–5)
ALP SERPL-CCNC: 66 U/L (ref 34–104)
ALT SERPL W P-5'-P-CCNC: 19 U/L (ref 7–52)
ANION GAP SERPL CALCULATED.3IONS-SCNC: 6 MMOL/L (ref 4–13)
APTT PPP: 30 SECONDS (ref 23–37)
AST SERPL W P-5'-P-CCNC: 20 U/L (ref 13–39)
ATRIAL RATE: 68 BPM
ATRIAL RATE: 75 BPM
ATRIAL RATE: 78 BPM
BASOPHILS # BLD AUTO: 0.1 THOUSANDS/ÂΜL (ref 0–0.1)
BASOPHILS NFR BLD AUTO: 1 % (ref 0–1)
BILIRUB SERPL-MCNC: 0.37 MG/DL (ref 0.2–1)
BILIRUB UR QL STRIP: NEGATIVE
BUN SERPL-MCNC: 37 MG/DL (ref 5–25)
CALCIUM SERPL-MCNC: 9.9 MG/DL (ref 8.4–10.2)
CARDIAC TROPONIN I PNL SERPL HS: 129 NG/L
CARDIAC TROPONIN I PNL SERPL HS: 129 NG/L
CARDIAC TROPONIN I PNL SERPL HS: 133 NG/L
CHLORIDE SERPL-SCNC: 102 MMOL/L (ref 96–108)
CLARITY UR: CLEAR
CO2 SERPL-SCNC: 28 MMOL/L (ref 21–32)
COLOR UR: COLORLESS
CREAT SERPL-MCNC: 1.49 MG/DL (ref 0.6–1.3)
EOSINOPHIL # BLD AUTO: 0.16 THOUSAND/ÂΜL (ref 0–0.61)
EOSINOPHIL NFR BLD AUTO: 2 % (ref 0–6)
ERYTHROCYTE [DISTWIDTH] IN BLOOD BY AUTOMATED COUNT: 13.5 % (ref 11.6–15.1)
GFR SERPL CREATININE-BSD FRML MDRD: 34 ML/MIN/1.73SQ M
GLUCOSE SERPL-MCNC: 97 MG/DL (ref 65–140)
GLUCOSE UR STRIP-MCNC: NEGATIVE MG/DL
HCT VFR BLD AUTO: 47.3 % (ref 34.8–46.1)
HGB BLD-MCNC: 15.6 G/DL (ref 11.5–15.4)
HGB UR QL STRIP.AUTO: NEGATIVE
IMM GRANULOCYTES # BLD AUTO: 0.06 THOUSAND/UL (ref 0–0.2)
IMM GRANULOCYTES NFR BLD AUTO: 1 % (ref 0–2)
INR PPP: 0.93 (ref 0.84–1.19)
KETONES UR STRIP-MCNC: NEGATIVE MG/DL
LEUKOCYTE ESTERASE UR QL STRIP: NEGATIVE
LYMPHOCYTES # BLD AUTO: 2.39 THOUSANDS/ÂΜL (ref 0.6–4.47)
LYMPHOCYTES NFR BLD AUTO: 25 % (ref 14–44)
MCH RBC QN AUTO: 32.6 PG (ref 26.8–34.3)
MCHC RBC AUTO-ENTMCNC: 33 G/DL (ref 31.4–37.4)
MCV RBC AUTO: 99 FL (ref 82–98)
MONOCYTES # BLD AUTO: 0.8 THOUSAND/ÂΜL (ref 0.17–1.22)
MONOCYTES NFR BLD AUTO: 8 % (ref 4–12)
NEUTROPHILS # BLD AUTO: 5.97 THOUSANDS/ÂΜL (ref 1.85–7.62)
NEUTS SEG NFR BLD AUTO: 63 % (ref 43–75)
NITRITE UR QL STRIP: NEGATIVE
NRBC BLD AUTO-RTO: 0 /100 WBCS
P AXIS: 54 DEGREES
P AXIS: 64 DEGREES
P AXIS: 64 DEGREES
PH UR STRIP.AUTO: 6.5 [PH]
PLATELET # BLD AUTO: 199 THOUSANDS/UL (ref 149–390)
PMV BLD AUTO: 12.3 FL (ref 8.9–12.7)
POTASSIUM SERPL-SCNC: 4.7 MMOL/L (ref 3.5–5.3)
PR INTERVAL: 118 MS
PR INTERVAL: 120 MS
PR INTERVAL: 128 MS
PROT SERPL-MCNC: 7.8 G/DL (ref 6.4–8.4)
PROT UR STRIP-MCNC: NEGATIVE MG/DL
PROTHROMBIN TIME: 13 SECONDS (ref 11.6–14.5)
QRS AXIS: -16 DEGREES
QRS AXIS: -3 DEGREES
QRS AXIS: 3 DEGREES
QRSD INTERVAL: 112 MS
QRSD INTERVAL: 114 MS
QRSD INTERVAL: 116 MS
QT INTERVAL: 396 MS
QT INTERVAL: 424 MS
QT INTERVAL: 428 MS
QTC INTERVAL: 450 MS
QTC INTERVAL: 451 MS
QTC INTERVAL: 477 MS
RBC # BLD AUTO: 4.79 MILLION/UL (ref 3.81–5.12)
SODIUM SERPL-SCNC: 136 MMOL/L (ref 135–147)
SP GR UR STRIP.AUTO: 1.01 (ref 1–1.03)
T WAVE AXIS: 25 DEGREES
T WAVE AXIS: 31 DEGREES
T WAVE AXIS: 32 DEGREES
TSH SERPL DL<=0.05 MIU/L-ACNC: 4.86 UIU/ML (ref 0.45–4.5)
UROBILINOGEN UR STRIP-ACNC: <2 MG/DL
VENTRICULAR RATE: 68 BPM
VENTRICULAR RATE: 75 BPM
VENTRICULAR RATE: 78 BPM
WBC # BLD AUTO: 9.48 THOUSAND/UL (ref 4.31–10.16)

## 2024-05-07 PROCEDURE — 85025 COMPLETE CBC W/AUTO DIFF WBC: CPT | Performed by: EMERGENCY MEDICINE

## 2024-05-07 PROCEDURE — 84439 ASSAY OF FREE THYROXINE: CPT

## 2024-05-07 PROCEDURE — 99285 EMERGENCY DEPT VISIT HI MDM: CPT

## 2024-05-07 PROCEDURE — 96374 THER/PROPH/DIAG INJ IV PUSH: CPT

## 2024-05-07 PROCEDURE — 99223 1ST HOSP IP/OBS HIGH 75: CPT | Performed by: INTERNAL MEDICINE

## 2024-05-07 PROCEDURE — 93010 ELECTROCARDIOGRAM REPORT: CPT | Performed by: INTERNAL MEDICINE

## 2024-05-07 PROCEDURE — 84443 ASSAY THYROID STIM HORMONE: CPT

## 2024-05-07 PROCEDURE — 96361 HYDRATE IV INFUSION ADD-ON: CPT

## 2024-05-07 PROCEDURE — 71046 X-RAY EXAM CHEST 2 VIEWS: CPT

## 2024-05-07 PROCEDURE — 81003 URINALYSIS AUTO W/O SCOPE: CPT

## 2024-05-07 PROCEDURE — 85610 PROTHROMBIN TIME: CPT | Performed by: NURSE PRACTITIONER

## 2024-05-07 PROCEDURE — 85730 THROMBOPLASTIN TIME PARTIAL: CPT | Performed by: NURSE PRACTITIONER

## 2024-05-07 PROCEDURE — 96376 TX/PRO/DX INJ SAME DRUG ADON: CPT

## 2024-05-07 PROCEDURE — 36415 COLL VENOUS BLD VENIPUNCTURE: CPT | Performed by: EMERGENCY MEDICINE

## 2024-05-07 PROCEDURE — 84484 ASSAY OF TROPONIN QUANT: CPT | Performed by: EMERGENCY MEDICINE

## 2024-05-07 PROCEDURE — 80053 COMPREHEN METABOLIC PANEL: CPT | Performed by: EMERGENCY MEDICINE

## 2024-05-07 PROCEDURE — 93005 ELECTROCARDIOGRAM TRACING: CPT

## 2024-05-07 RX ORDER — HEPARIN SODIUM 1000 [USP'U]/ML
4000 INJECTION, SOLUTION INTRAVENOUS; SUBCUTANEOUS ONCE
Status: COMPLETED | OUTPATIENT
Start: 2024-05-07 | End: 2024-05-07

## 2024-05-07 RX ORDER — HEPARIN SODIUM 10000 [USP'U]/100ML
3-20 INJECTION, SOLUTION INTRAVENOUS
Status: DISCONTINUED | OUTPATIENT
Start: 2024-05-07 | End: 2024-05-07

## 2024-05-07 RX ORDER — HEPARIN SODIUM 1000 [USP'U]/ML
2000 INJECTION, SOLUTION INTRAVENOUS; SUBCUTANEOUS EVERY 6 HOURS PRN
Status: DISCONTINUED | OUTPATIENT
Start: 2024-05-07 | End: 2024-05-07

## 2024-05-07 RX ORDER — HYDRALAZINE HYDROCHLORIDE 20 MG/ML
10 INJECTION INTRAMUSCULAR; INTRAVENOUS EVERY 6 HOURS PRN
Status: DISCONTINUED | OUTPATIENT
Start: 2024-05-07 | End: 2024-05-08 | Stop reason: HOSPADM

## 2024-05-07 RX ORDER — ASPIRIN 81 MG/1
324 TABLET, CHEWABLE ORAL ONCE
Status: COMPLETED | OUTPATIENT
Start: 2024-05-07 | End: 2024-05-07

## 2024-05-07 RX ORDER — ASPIRIN 81 MG/1
81 TABLET, CHEWABLE ORAL DAILY
Status: DISCONTINUED | OUTPATIENT
Start: 2024-05-08 | End: 2024-05-08 | Stop reason: HOSPADM

## 2024-05-07 RX ORDER — ONDANSETRON 2 MG/ML
4 INJECTION INTRAMUSCULAR; INTRAVENOUS EVERY 6 HOURS PRN
Status: DISCONTINUED | OUTPATIENT
Start: 2024-05-07 | End: 2024-05-08 | Stop reason: HOSPADM

## 2024-05-07 RX ORDER — SENNOSIDES 8.6 MG
1 TABLET ORAL
Status: DISCONTINUED | OUTPATIENT
Start: 2024-05-07 | End: 2024-05-08 | Stop reason: HOSPADM

## 2024-05-07 RX ORDER — LEVOTHYROXINE SODIUM 112 UG/1
112 TABLET ORAL
Status: DISCONTINUED | OUTPATIENT
Start: 2024-05-08 | End: 2024-05-08 | Stop reason: HOSPADM

## 2024-05-07 RX ORDER — SODIUM CHLORIDE 9 MG/ML
75 INJECTION, SOLUTION INTRAVENOUS CONTINUOUS
Status: DISPENSED | OUTPATIENT
Start: 2024-05-07 | End: 2024-05-08

## 2024-05-07 RX ORDER — MAGNESIUM HYDROXIDE/ALUMINUM HYDROXICE/SIMETHICONE 120; 1200; 1200 MG/30ML; MG/30ML; MG/30ML
30 SUSPENSION ORAL EVERY 6 HOURS PRN
Status: DISCONTINUED | OUTPATIENT
Start: 2024-05-07 | End: 2024-05-08 | Stop reason: HOSPADM

## 2024-05-07 RX ORDER — LABETALOL HYDROCHLORIDE 5 MG/ML
10 INJECTION, SOLUTION INTRAVENOUS EVERY 6 HOURS PRN
Status: DISCONTINUED | OUTPATIENT
Start: 2024-05-07 | End: 2024-05-08 | Stop reason: HOSPADM

## 2024-05-07 RX ORDER — HEPARIN SODIUM 1000 [USP'U]/ML
4000 INJECTION, SOLUTION INTRAVENOUS; SUBCUTANEOUS EVERY 6 HOURS PRN
Status: DISCONTINUED | OUTPATIENT
Start: 2024-05-07 | End: 2024-05-07

## 2024-05-07 RX ORDER — ATORVASTATIN CALCIUM 40 MG/1
40 TABLET, FILM COATED ORAL
Status: DISCONTINUED | OUTPATIENT
Start: 2024-05-07 | End: 2024-05-08 | Stop reason: HOSPADM

## 2024-05-07 RX ORDER — CARVEDILOL 6.25 MG/1
6.25 TABLET ORAL 2 TIMES DAILY WITH MEALS
Status: DISCONTINUED | OUTPATIENT
Start: 2024-05-07 | End: 2024-05-08 | Stop reason: HOSPADM

## 2024-05-07 RX ADMIN — HEPARIN SODIUM 12 UNITS/KG/HR: 10000 INJECTION, SOLUTION INTRAVENOUS at 13:56

## 2024-05-07 RX ADMIN — CARVEDILOL 6.25 MG: 6.25 TABLET, FILM COATED ORAL at 16:07

## 2024-05-07 RX ADMIN — CARVEDILOL 6.25 MG: 6.25 TABLET, FILM COATED ORAL at 13:54

## 2024-05-07 RX ADMIN — HEPARIN SODIUM 4000 UNITS: 1000 INJECTION INTRAVENOUS; SUBCUTANEOUS at 13:55

## 2024-05-07 RX ADMIN — ASPIRIN 81 MG 324 MG: 81 TABLET ORAL at 13:53

## 2024-05-07 RX ADMIN — Medication 10 MG: at 18:50

## 2024-05-07 RX ADMIN — SODIUM CHLORIDE 1000 ML: 0.9 INJECTION, SOLUTION INTRAVENOUS at 11:34

## 2024-05-07 RX ADMIN — ATORVASTATIN CALCIUM 40 MG: 40 TABLET, FILM COATED ORAL at 16:07

## 2024-05-07 RX ADMIN — SODIUM CHLORIDE 75 ML/HR: 0.9 INJECTION, SOLUTION INTRAVENOUS at 15:13

## 2024-05-07 NOTE — ED PROCEDURE NOTE
Procedure  POC Cardiac US    Date/Time: 5/7/2024 1:35 PM    Performed by: Edward Garcia MD  Authorized by: Edward Garcia MD    Patient location:  ED  Procedure details:     Exam Type:  Diagnostic    Assessment / Evaluation for: cardiac function and pericardial effusion      Image quality: non-diagnostic    POC Lung US    Date/Time: 5/7/2024 1:38 PM    Performed by: Edward Garcia MD  Authorized by: Edward Garcia MD    Patient location:  ED  Procedure details:     Exam Type:  Diagnostic    Assessment / Evaluation for:  Pneumothorax and pleural effusion    Structures Visualized: pleural line, rib, left hemithorax and right hemithorax      Exam Type: initial exam      Image quality: diagnostic      Image availability:  Video obtained  Left Hemithorax Findings:     Left pleura visualized:  Visualized    Left Hemithorax Findings: normal      Left lung findings: normal interstitium    Right Lung Findings:     Right pleural visualized:  Visualized    Right hemithorax findings: normal      Right lung findings: normal interstitium    Interpretation:     Findings: normal thoracic ultrasound    POC AAA US    Date/Time: 5/7/2024 1:41 PM    Performed by: Edward Garcia MD  Authorized by: Edward Garcia MD    Patient location:  ED  Performing Provider:  Resident  Procedure details:     Exam Type:  Diagnostic    Indications: chest pain      Views Obtained:  Transverse proximal, transverse mid view and sagittal (longitudinal) view    Image quality: diagnostic      Image availability:  Video obtained  Findings:     Abdominal Aorta Findings: normal    Interpretation:     Aortic ultrasound impression: aorta normal    POC Renal US    Date/Time: 5/7/2024 1:42 PM    Performed by: Edward Garcia MD  Authorized by: Edward Garcia MD    Patient location:  ED  Performed by:  Resident  Procedure details:     Exam Type:  Diagnostic    Indications: acute renal insufficiency      Assessment for:  Bladder volume and suspected hydronephrosis    Views  obtained: bladder (transverse and sagittal), left kidney and right kidney      Image quality: diagnostic      Image availability:  Video obtained  Findings:     LEFT kidney findings: unremarkable      LEFT hydronephrosis: none      RIGHT kidney findings: unremarkable      RIGHT hydronephrosis: none      Bladder:  Visualized    Bladder findings: distended bladder    Interpretation:     Renal ultrasound impressions: normal exam                     Edward Garcia MD  05/07/24 [id number]

## 2024-05-07 NOTE — CONSULTS
Cardiology   Alyse Gray 74 y.o. female MRN: [id number]  Unit/Bed#: ED-10 Encounter: [id number]      Reason for Consult / Principal Problem: Chest pain    Physician Requesting Consult:  Whit Temple MD    Outpatient Cardiologist: none     Assessment  1. Chest pain  -No current complaints of CP  -ECG on admission demonstrated NSR w/RBBB (chronic)  -HS troponin level 133 on admission  2. Accelerated hypertension  -BP reported to have up to 237/116 at home (this reading was from this a.m prior to admission).  -BP elevated on admission at 192/92, last recorded at 141/85.   -Outpatient BP regimen: none  -Inpatient BP regimen: none  3. CKD stage III- elevated creatinine above baseline on admission - likely prerenal - lab data suggestive of dehydration  -Creatinine level around 0.9-1.1, creatinine level 1.49 on admission.  -Currently receiving IV fluids for hydration.  4.  Dyslipidemia  -Lipid profile 6/2023; cholesterol 200, triglyceride 113, HDL 46 and LDL calculated 131.  -Not previously on statin therapy.  5.  Longstanding nicotine dependence.  -Smokes half a pack per day.  6.  Hypothyroidism  -TSH level 2.4    Plan   -Symptomatology could certainly be secondary to uncontrolled/accelerated HTN vs possible coronary ischemia - as she does have risk factors for CAD. Currently asymptomatic from a cardiac perspective.ECG on admission demonstrated NSR with a chronic RBBB. Initial HS troponin level only mildly elevated at 133; 2nd and 3rd set pending. Would recommend proceeding w/[facility] for further ischemic testing if no significant rise in troponin levels  -Obtain TTE  -Start IV heparin GTT ACS low protocol until ACS has been ruled out.  -Start low dose aspirin.   -Needs improved BP control.  Start Coreg 6.25 mg twice daily. Consider addition of ARB if needed - pending improvement in renal function.   -Start atorvastatin 40 mg daily (30% risk of CV event in 10 yrs). Repeat lipid profile with LDL reflex.    -IV fluids for hydration.  Repeat BMP in the AM.  -Smoking cessation advised, nicotine patch.  -Monitor on telemetry.    HPI: Alyse Gray 74 y.o. year old female w/a medical hx of hypertension, dyslipidemia, CKD stage III, and hypothyroidism. Current every day smoker, denies excessive alcohol use or recreational use.  Family history of CAD/stenting in her older brother.  She did not previously follow with a routine Cardiology provider prior to admission.   She resides at a residence with her son and daughter-in-law.   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  Per her report she is quite active at her job, on her feet all day, and  lifting heavy baskets of laundry.  With this level of activity she does not report any concerning anginal or anginal equivalent symptoms.   She presented to the [facility] campus on 5/7 w/c/o elevated blood pressures readings since last evening w/associated chest pain.  Symptoms lasted for a few hours from the morning to early afternoon.  No further symptoms in the afternoon, however blood pressure levels remained elevated throughout the day and early this morning prompting her to seek further evaluation. BP level was 237/116 PTA.  Further workup in the ED  Hemodynamics on admission  Temp 97.7F, HR 93, RR 20, /92, RA sat 97%  Lab data on admission  Na+136, K+level 4.7, chloride 102, CO2 28, anion gap 6, BUN / creatinine level 1.49, glucose, LFTs - normal, WBC 9.5, HGB 15.6, PLT   Imaging  CXR pa.lateral - pending    ECG on admission demonstrated NSR w/RBBB    Cardiology has been consulted for further treatment recommendations/management.     Family History:   Family History   Problem Relation Age of Onset    Heart disease Mother     Diabetes Mother     Hypertension Mother     Breast cancer Sister     Colon cancer Brother     Heart disease Brother     Diabetes Brother     Hypertension Brother      Historical Information   Past Medical History:    Diagnosis Date    Disease of thyroid gland     Polymyalgia rheumatica (HCC)      Past Surgical History:   Procedure Laterality Date    CHOLECYSTECTOMY      FOOT SURGERY      GALLBLADDER SURGERY      SHOULDER ARTHROSCOPY Right      Social History   Social History     Substance and Sexual Activity   Alcohol Use No     Social History     Substance and Sexual Activity   Drug Use No     Social History     Tobacco Use   Smoking Status Every Day    Current packs/day: 0.50    Average packs/day: 0.5 packs/day for 53.0 years (26.5 ttl pk-yrs)    Types: Cigarettes    Passive exposure: Current   Smokeless Tobacco Never     Family History:   Family History   Problem Relation Age of Onset    Heart disease Mother     Diabetes Mother     Hypertension Mother     Breast cancer Sister     Colon cancer Brother     Heart disease Brother     Diabetes Brother     Hypertension Brother        Review of Systems:  Review of Systems   Constitutional:  Negative for chills, fatigue and fever.   Eyes:  Negative for visual disturbance.   Respiratory:  Negative for cough, chest tightness and shortness of breath.    Cardiovascular:  Negative for chest pain, palpitations and leg swelling.   Gastrointestinal:  Negative for abdominal pain.   Neurological:  Negative for dizziness, light-headedness and headaches.   All other systems reviewed and are negative.          Scheduled Meds:  Current Facility-Administered Medications   Medication Dose Route Frequency Provider Last Rate    sodium chloride  1,000 mL Intravenous Once Dianne Greco MD 1,000 mL (05/07/24 1134)     Continuous Infusions:   PRN Meds:.  all current active meds have been reviewed and current meds:   Current Facility-Administered Medications   Medication Dose Route Frequency    sodium chloride 0.9 % bolus 1,000 mL  1,000 mL Intravenous Once       Allergies   Allergen Reactions    Bee Venom     Naproxen Hives    Penicillins Hives    Vicodin  [Hydrocodone-Acetaminophen] Hives and  Itching    Other Rash     Spider bite        Objective   Vitals: Blood pressure 141/85, pulse 88, temperature 97.7 °F (36.5 °C), temperature source Oral, resp. rate 20, SpO2 91%., There is no height or weight on file to calculate BMI.,   Orthostatic Blood Pressures      Flowsheet Row Most Recent Value   Blood Pressure 141/85 filed at 05/07/2024 1130   Patient Position - Orthostatic VS Sitting filed at 05/07/2024 1130          No intake or output data in the 24 hours ending 05/07/24 1205    Invasive Devices       Peripheral Intravenous Line  Duration             Peripheral IV 05/07/24 Right Antecubital <1 day                  Physical Exam:  Physical Exam  Vitals and nursing note reviewed.   Constitutional:       General: She is not in acute distress.     Appearance: She is not diaphoretic.   HENT:      Head: Normocephalic and atraumatic.   Eyes:      General: No scleral icterus.  Cardiovascular:      Rate and Rhythm: Normal rate and regular rhythm.      Pulses: Normal pulses.      Heart sounds: Normal heart sounds.   Pulmonary:      Effort: Pulmonary effort is normal.      Breath sounds: Normal breath sounds. No wheezing or rales.   Abdominal:      Palpations: Abdomen is soft.   Musculoskeletal:      Right lower leg: No edema.      Left lower leg: No edema.   Skin:     General: Skin is warm and dry.      Capillary Refill: Capillary refill takes less than 2 seconds.   Neurological:      General: No focal deficit present.      Mental Status: She is alert and oriented to person, place, and time.   Psychiatric:         Mood and Affect: Mood normal.         Lab Results:   Recent Results (from the past 24 hour(s))   CBC and differential    Collection Time: 05/07/24 10:47 AM   Result Value Ref Range    WBC 9.48 4.31 - 10.16 Thousand/uL    RBC 4.79 3.81 - 5.12 Million/uL    Hemoglobin 15.6 (H) 11.5 - 15.4 g/dL    Hematocrit 47.3 (H) 34.8 - 46.1 %    MCV 99 (H) 82 - 98 fL    MCH 32.6 26.8 - 34.3 pg    MCHC 33.0 31.4 - 37.4  "g/dL    RDW 13.5 11.6 - 15.1 %    MPV 12.3 8.9 - 12.7 fL    Platelets 199 149 - 390 Thousands/uL    nRBC 0 /100 WBCs    Segmented % 63 43 - 75 %    Immature Grans % 1 0 - 2 %    Lymphocytes % 25 14 - 44 %    Monocytes % 8 4 - 12 %    Eosinophils Relative 2 0 - 6 %    Basophils Relative 1 0 - 1 %    Absolute Neutrophils 5.97 1.85 - 7.62 Thousands/µL    Absolute Immature Grans 0.06 0.00 - 0.20 Thousand/uL    Absolute Lymphocytes 2.39 0.60 - 4.47 Thousands/µL    Absolute Monocytes 0.80 0.17 - 1.22 Thousand/µL    Eosinophils Absolute 0.16 0.00 - 0.61 Thousand/µL    Basophils Absolute 0.10 0.00 - 0.10 Thousands/µL   Comprehensive metabolic panel    Collection Time: 05/07/24 10:47 AM   Result Value Ref Range    Sodium 136 135 - 147 mmol/L    Potassium 4.7 3.5 - 5.3 mmol/L    Chloride 102 96 - 108 mmol/L    CO2 28 21 - 32 mmol/L    ANION GAP 6 4 - 13 mmol/L    BUN 37 (H) 5 - 25 mg/dL    Creatinine 1.49 (H) 0.60 - 1.30 mg/dL    Glucose 97 65 - 140 mg/dL    Calcium 9.9 8.4 - 10.2 mg/dL    AST 20 13 - 39 U/L    ALT 19 7 - 52 U/L    Alkaline Phosphatase 66 34 - 104 U/L    Total Protein 7.8 6.4 - 8.4 g/dL    Albumin 4.3 3.5 - 5.0 g/dL    Total Bilirubin 0.37 0.20 - 1.00 mg/dL    eGFR 34 ml/min/1.73sq m   HS Troponin 0hr (reflex protocol)    Collection Time: 05/07/24 10:47 AM   Result Value Ref Range    hs TnI 0hr 133 (H) \"Refer to ACS Flowchart\"- see link ng/L   ECG 12 lead    Collection Time: 05/07/24 10:47 AM   Result Value Ref Range    Ventricular Rate 78 BPM    Atrial Rate 78 BPM    ND Interval 120 ms    QRSD Interval 112 ms    QT Interval 396 ms    QTC Interval 451 ms    P [place] 64 degrees    QRS Axis -3 degrees    T Wave Axis 31 degrees     Imaging: I have personally reviewed pertinent reports.   and I have personally reviewed pertinent films in PACS  Code Status: LVL 1 Full Code.   Epic/ Allscripts/Care Everywhere records reviewed: Yes    * Please Note: Fluency DirectDictation voice to text software may have been used in " the creation of this document. **

## 2024-05-07 NOTE — ED PROVIDER NOTES
History  Chief Complaint   Patient presents with    Hypertension     Pt presents with HTN. States she took her BP at home and it was elevated. States she does not take any BP meds but has some midsternal CP since yesterday.     74-year-old female with high blood pressure without diagnosis of hypertension presenting to ED for evaluation of chest pain, hypertension.  Patient reports her chest pain started yesterday while at work and has continued since, is left-sided, nonexertional, nonpleuritic, nonreproducible, nonradiating, has been constant.  Took her BP yesterday when the chest pain started and it was 228/99.  This morning, chest pain continued but was less intense than yesterday, patient again took her BP which was 205/111.  Not on any antihypertensives.  Patient normally does not take her blood pressure at home.  Called her PCP this morning due to her symptoms who advised coming to the ED for an evaluation.    Denies dizziness, headache, vision changes, neck pain, shortness of breath, ab pain, urinary symptoms, URI symptoms, fever or chills.    MDM: Overall clinical picture concerning for hypertensive emergency w/ elevated troponin (133-->129-->129) and PEG.  ECG with RBBB without ischemic changes.  Blood pressure in triage was 192/92, blood pressure improved throughout ED stay down to 140 systolic without the use of antihypertensive medications.  Case discussed with cardiology on-call who saw and evaluated patient at bedside: thought symptoms were due to accelerated hypertension rather than ischemia on ECG.  Recommended aspirin and heparin.  HEART Score= 9. Case discussed with internal medicine, patient admitted to their service.        Prior to Admission Medications   Prescriptions Last Dose Informant Patient Reported? Taking?   cholecalciferol (VITAMIN D3) 1,000 units tablet 5/6/2024 Self No Yes   Sig: Take 1 tablet (1,000 Units total) by mouth daily   levothyroxine 112 mcg tablet 5/7/2024  No Yes   Sig:  Take 1 tablet by mouth once daily      Facility-Administered Medications: None       Past Medical History:   Diagnosis Date    Disease of thyroid gland     Polymyalgia rheumatica (HCC)        Past Surgical History:   Procedure Laterality Date    CHOLECYSTECTOMY      FOOT SURGERY      GALLBLADDER SURGERY      SHOULDER ARTHROSCOPY Right        Family History   Problem Relation Age of Onset    Heart disease Mother     Diabetes Mother     Hypertension Mother     Breast cancer Sister     Colon cancer Brother     Heart disease Brother     Diabetes Brother     Hypertension Brother      I have reviewed and agree with the history as documented.    E-Cigarette/Vaping    E-Cigarette Use Never User      E-Cigarette/Vaping Substances     Social History     Tobacco Use    Smoking status: Every Day     Current packs/day: 0.50     Average packs/day: 0.5 packs/day for 53.0 years (26.5 ttl pk-yrs)     Types: Cigarettes     Passive exposure: Current    Smokeless tobacco: Never   Vaping Use    Vaping status: Never Used   Substance Use Topics    Alcohol use: No    Drug use: No        Review of Systems   Constitutional:  Negative for chills and fever.   HENT:  Negative for ear pain and sore throat.    Eyes:  Negative for pain and visual disturbance.   Respiratory:  Negative for cough and shortness of breath.    Cardiovascular:  Positive for chest pain. Negative for palpitations.        Hypertension   Gastrointestinal:  Negative for abdominal pain and vomiting.   Genitourinary:  Negative for dysuria and hematuria.   Musculoskeletal:  Negative for arthralgias and back pain.   Skin:  Negative for color change and rash.   Neurological:  Negative for seizures and syncope.   All other systems reviewed and are negative.      Physical Exam  ED Triage Vitals   Temperature Pulse Respirations Blood Pressure SpO2   05/07/24 1042 05/07/24 1042 05/07/24 1042 05/07/24 1042 05/07/24 1042   97.7 °F (36.5 °C) 93 20 (!) 192/92 97 %      Temp Source Heart  Rate Source Patient Position - Orthostatic VS BP Location FiO2 (%)   05/07/24 1042 05/07/24 1042 05/07/24 1130 05/07/24 1042 --   Oral Monitor Sitting Right arm       Pain Score       05/07/24 1042       5             Orthostatic Vital Signs  Vitals:    05/07/24 1430 05/07/24 1500 05/07/24 1607 05/07/24 1630   BP: 138/94 100/82 (!) 196/89 (!) 201/93   Pulse: 91 73 67 88   Patient Position - Orthostatic VS:    Sitting       Physical Exam  Vitals and nursing note reviewed.   Constitutional:       General: She is not in acute distress.     Appearance: She is well-developed.   HENT:      Head: Normocephalic and atraumatic.   Eyes:      General: Lids are normal. Lids are everted, no foreign bodies appreciated. Vision grossly intact. Gaze aligned appropriately.      Extraocular Movements: Extraocular movements intact.      Conjunctiva/sclera: Conjunctivae normal.      Pupils: Pupils are equal, round, and reactive to light.      Visual Fields: Right eye visual fields normal and left eye visual fields normal.   Cardiovascular:      Rate and Rhythm: Normal rate and regular rhythm.      Heart sounds: No murmur heard.  Pulmonary:      Effort: Pulmonary effort is normal. No respiratory distress.      Breath sounds: Normal breath sounds.   Abdominal:      Palpations: Abdomen is soft.      Tenderness: There is no abdominal tenderness.   Musculoskeletal:         General: No swelling.      Cervical back: Neck supple.   Skin:     General: Skin is warm and dry.      Capillary Refill: Capillary refill takes less than 2 seconds.   Neurological:      Mental Status: She is alert and oriented to person, place, and time.      GCS: GCS eye subscore is 4. GCS verbal subscore is 5. GCS motor subscore is 6.      Cranial Nerves: Cranial nerves 2-12 are intact.      Sensory: Sensation is intact.      Motor: Motor function is intact.      Coordination: Coordination is intact.      Gait: Gait is intact.   Psychiatric:         Mood and Affect: Mood  normal.         ED Medications  Medications   carvedilol (COREG) tablet 6.25 mg (6.25 mg Oral Given 5/7/24 1607)   atorvastatin (LIPITOR) tablet 40 mg (40 mg Oral Given 5/7/24 1607)   labetalol (NORMODYNE) injection 10 mg (has no administration in time range)   hydrALAZINE (APRESOLINE) injection 10 mg (has no administration in time range)   sodium chloride 0.9 % infusion (75 mL/hr Intravenous New Bag 5/7/24 1513)   aspirin chewable tablet 81 mg (has no administration in time range)   sodium chloride 0.9 % bolus 1,000 mL (0 mL Intravenous Stopped 5/7/24 1514)   aspirin chewable tablet 324 mg (324 mg Oral Given 5/7/24 1353)   heparin (porcine) injection 4,000 Units (4,000 Units Intravenous Given 5/7/24 1355)       Diagnostic Studies  Results Reviewed       Procedure Component Value Units Date/Time    UA (URINE) with reflex to Scope [[id number]] Collected: 05/07/24 1635    Lab Status: Final result Specimen: Urine, Clean Catch Updated: 05/07/24 1649     Color, UA Colorless     Clarity, UA Clear     Specific Gravity, UA 1.009     pH, UA 6.5     Leukocytes, UA Negative     Nitrite, UA Negative     Protein, UA Negative mg/dl      Glucose, UA Negative mg/dl      Ketones, UA Negative mg/dl      Urobilinogen, UA <2.0 mg/dl      Bilirubin, UA Negative     Occult Blood, UA Negative    HS Troponin I 4hr [[id number]]  (Abnormal) Collected: 05/07/24 1510    Lab Status: Final result Specimen: Blood from Arm, Left Updated: 05/07/24 1543     hs TnI 4hr 129 ng/L      Delta 4hr hsTnI -4 ng/L     APTT six (6) hours after Heparin bolus/drip initiation or dosing change [[id number]]  (Normal) Collected: 05/07/24 1325    Lab Status: Final result Specimen: Blood from Arm, Right Updated: 05/07/24 1359     PTT 30 seconds     Protime-INR [[id number]]  (Normal) Collected: 05/07/24 1325    Lab Status: Final result Specimen: Blood from Arm, Right Updated: 05/07/24 1359     Protime 13.0 seconds      INR 0.93    HS Troponin I 2hr [[id number]]  (Abnormal)  Collected: 05/07/24 1321    Lab Status: Final result Specimen: Blood from Arm, Right Updated: 05/07/24 1357     hs TnI 2hr 129 ng/L      Delta 2hr hsTnI -4 ng/L     HS Troponin 0hr (reflex protocol) [[id number]]  (Abnormal) Collected: 05/07/24 1047    Lab Status: Final result Specimen: Blood from Arm, Right Updated: 05/07/24 1126     hs TnI 0hr 133 ng/L     Comprehensive metabolic panel [[id number]]  (Abnormal) Collected: 05/07/24 1047    Lab Status: Final result Specimen: Blood from Arm, Right Updated: 05/07/24 1119     Sodium 136 mmol/L      Potassium 4.7 mmol/L      Chloride 102 mmol/L      CO2 28 mmol/L      ANION GAP 6 mmol/L      BUN 37 mg/dL      Creatinine 1.49 mg/dL      Glucose 97 mg/dL      Calcium 9.9 mg/dL      AST 20 U/L      ALT 19 U/L      Alkaline Phosphatase 66 U/L      Total Protein 7.8 g/dL      Albumin 4.3 g/dL      Total Bilirubin 0.37 mg/dL      eGFR 34 ml/min/1.73sq m     Narrative:      National Kid[employer] guidelines for Chronic Kidney Disease (CKD):     Stage 1 with normal or high GFR (GFR > 90 mL/min/1.73 square meters)    Stage 2 Mild CKD (GFR = 60-89 mL/min/1.73 square meters)    Stage 3A Moderate CKD (GFR = 45-59 mL/min/1.73 square meters)    Stage 3B Moderate CKD (GFR = 30-44 mL/min/1.73 square meters)    Stage 4 Severe CKD (GFR = 15-29 mL/min/1.73 square meters)    Stage 5 End Stage CKD (GFR <15 mL/min/1.73 square meters)  Note: GFR calculation is accurate only with a steady state creatinine    CBC and differential [[id number]]  (Abnormal) Collected: 05/07/24 1047    Lab Status: Final result Specimen: Blood from Arm, Right Updated: 05/07/24 1112     WBC 9.48 Thousand/uL      RBC 4.79 Million/uL      Hemoglobin 15.6 g/dL      Hematocrit 47.3 %      MCV 99 fL      MCH 32.6 pg      MCHC 33.0 g/dL      RDW 13.5 %      MPV 12.3 fL      Platelets 199 Thousands/uL      nRBC 0 /100 WBCs      Segmented % 63 %      Immature Grans % 1 %      Lymphocytes % 25 %      Monocytes % 8 %       Eosinophils Relative 2 %      Basophils Relative 1 %      Absolute Neutrophils 5.97 Thousands/µL      Absolute Immature Grans 0.06 Thousand/uL      Absolute Lymphocytes 2.39 Thousands/µL      Absolute Monocytes 0.80 Thousand/µL      Eosinophils Absolute 0.16 Thousand/µL      Basophils Absolute 0.10 Thousands/µL                    XR chest 2 views   ED Interpretation by Dianne Greco MD (05/07 1246)   No acute cardiopulmonary disease, no wide mediastinum            Procedures  ECG 12 Lead Documentation Only    Date/Time: 5/7/2024 11:15 AM    Performed by: Dianne Greco MD  Authorized by: Dianne Greco MD    Indications / Diagnosis:  CP  Patient location:  ED  Previous ECG:     Previous ECG:  Compared to current    Comparison ECG info:  8/29/2017    Similarity:  Changes noted (Nonspecific T wave abnormality no longer seen in anterior leads)  Interpretation:     Interpretation: abnormal    Rate:     ECG rate:  78    ECG rate assessment: normal    Rhythm:     Rhythm: sinus rhythm    Ectopy:     Ectopy: none    QRS:     QRS axis:  Normal    QRS intervals:  Wide  Conduction:     Conduction: abnormal      Abnormal conduction: complete RBBB    ST segments:     ST segments:  Normal  T waves:     T waves: inverted      Inverted:  III        ED Course  ED Course as of 05/07/24 1722   Tue May 07, 2024   1127 Creatinine(!): 1.49  PEG   1128 hs TnI 0hr(!): 133  No STD or RADHA on ECG   1134 Cardiology on-call, Dr. Roger Welch texted for reccs    1144 I sent cardiology EKGs for their review.  They believe this seems like patient's symptoms are due to accelerated hypertension.  ECG is nonischemic, if patient is having chest pain and no concern for dissection, then can treat as ACS while trending troponins.   1320 Cardiology saw and evaluated patient at bedside, ordered heparin, aspirin, Coreg, Lipitor   1406 SLIM texted for admission              HEART Risk Score      Flowsheet Row Most Recent Value   Heart Score Risk  Calculator    History 2 Filed at: 05/07/2024 1409   ECG 1 Filed at: 05/07/2024 1409   Age 2 Filed at: 05/07/2024 1409   Risk Factors 2 Filed at: 05/07/2024 1409   Troponin 2 Filed at: 05/07/2024 1409   HEART Score 9 Filed at: 05/07/2024 1409             Stroke Assessment       Row Name 05/07/24 1710             NIH Stroke Scale    Interval Baseline      Level of Consciousness (1a.) 0      LOC Questions (1b.) 0      LOC Commands (1c.) 0      Best Gaze (2.) 0      Visual (3.) 0      Facial Palsy (4.) 0      Motor Arm, Left (5a.) 0      Motor Arm, Right (5b.) 0      Motor Leg, Left (6a.) 0      Motor Leg, Right (6b.) 0      Limb Ataxia (7.) 0      Sensory (8.) 0      Best Language (9.) 0      Dysarthria (10.) 0      Extinction and Inattention (11.) (Formerly Neglect) 0      Total 0                    Flowsheet Row Most Recent Value   Thrombolytic Decision Options    Thrombolytic Decision Patient not a candidate.                              Medical Decision Making  74-year-old female presenting to ED for evaluation of high blood pressure without diagnosis of hypertension not on any antihypertensives at home for the past 48 hours, with associated left-sided chest pain.    Differentials including but not limited to: Hypertensive emergency, hypertensive urgency, undiagnosed hypertension, angina, ACS, arrhythmia, anemia, electrolyte abnormality.      No focal neurodeficits on exam, NIHSS = 0, doubt TIA, stroke, or press.     Doubt dissection without other sx's, normal pulses throughout, no wide mediastinum on CXR, no neuro sx's, no fnd's and declined CP on cardiology's evaluation, very well appearing/in no acute distress, bedside US without dissection flap.    Will obtain labs, ECG, CXR        Amount and/or Complexity of Data Reviewed  Labs: ordered. Decision-making details documented in ED Course.  Radiology: ordered and independent interpretation performed.  ECG/medicine tests: ordered and independent interpretation  performed.    Risk  Decision regarding hospitalization.          Disposition  Final diagnoses:   Hypertensive emergency   Elevated troponin   PEG (acute kidney injury) ([facility])   Right bundle branch block     Time reflects when diagnosis was documented in both MDM as applicable and the Disposition within this note       Time User Action Codes Description Comment    5/7/2024  2:07 PM Rendano, Dianne Add [I10] Hypertension     5/7/2024  2:07 PM Rendano, Dianne Add [R79.89] Elevated troponin     5/7/2024  2:07 PM Rendano, Dianne Add [N17.9] PEG (acute kidney injury) ([facility])     5/7/2024  2:08 PM Rendano, Dianne Add [I45.10] RBBB     5/7/2024  5:06 PM Rendano, Dianne Modify [R79.89] Elevated troponin     5/7/2024  5:06 PM Rendano, Dianne Remove [I10] Hypertension     5/7/2024  5:07 PM Rendano, Dianne Add [I16.1] Hypertensive emergency     5/7/2024  5:07 PM Rendano, Idanne Modify [R79.89] Elevated troponin     5/7/2024  5:07 PM Rendano, Dianne Modify [I16.1] Hypertensive emergency     5/7/2024  5:07 PM Rendano, Dianne Remove [R79.89] Elevated troponin     5/7/2024  5:07 PM Rendano, Dianne Remove [N17.9] PEG (acute kidney injury) ([facility])     5/7/2024  5:07 PM Rendano, Dianne Remove [I45.10] RBBB     5/7/2024  5:07 PM Rendano, Dianne Add [R79.89] Elevated troponin     5/7/2024  5:07 PM Rendano, Dianne Add [N17.9] PEG (acute kidney injury) ([facility])     5/7/2024  5:07 PM Rendano, Dianne Add [I45.10] Right bundle branch block           ED Disposition       ED Disposition   Admit    Condition   Stable    Date/Time   Tue May 7, 2024 7668    Comment   Case was discussed with PHILIPPE and the patient's admission status was agreed to be Admission Status: inpatient status to the service of Dr. Dominguez .               Follow-up Information    None         Patient's Medications   Discharge Prescriptions    No medications on file     No discharge procedures on file.    PDMP Review         Value Time User    PDMP Reviewed  Yes 11/29/2019  2:43 PM Kaylee  Tyree Duran,              ED Provider  Attending physically available and evaluated Alyse Gray. I managed the patient along with the ED Attending.    Electronically Signed by           Dianne Greco MD  05/07/24 6920

## 2024-05-07 NOTE — ASSESSMENT & PLAN NOTE
BP elevated to as high as 201/93, also with PEG  Latest Blood Pressure: 120/83  BP now well-controlled on carvedilol      Plan:  Start Carvedilol 6.25 mg PO BID, prescription sent to pharmacy   Patient instructed to monitor blood pressure at home, bring records to PCP visit  Continue to encourage smoking cessation   Outpatient follow up with PCP

## 2024-05-07 NOTE — ASSESSMENT & PLAN NOTE
Blood Pressure: 138/94      Plan:  Continue Carvedilol 6/25mg PO BID  Monitor blood pressure  Tele  Hydralazine 10mg IV q6hrs for SBP>160 and/or DBP>100 if HR <70  Labetalol 10mg IV q6hrs for SBP>160 and/or DBP> 100 if HR >70  Smoking cessation   Follow up with cardiology outpt

## 2024-05-07 NOTE — Clinical Note
Case was discussed with PHILIPPE and the patient's admission status was agreed to be Admission Status: inpatient status to the service of Dr. ALICIA Rodas

## 2024-05-07 NOTE — ASSESSMENT & PLAN NOTE
Lab Results   Component Value Date    HSTNI0 133 (H) 05/07/2024    HSTNI2 129 (H) 05/07/2024    HSTNID2 -4 05/07/2024     Lab Results   Component Value Date    GYE4JVHABWJD 2.409 06/09/2023    FREET4 1.64 (H) 03/23/2022     Lab Results   Component Value Date    CHOLESTEROL 200 06/09/2023    TRIG 113 06/09/2023    HDL 46 (L) 06/09/2023    LDLCALC 131 (H) 06/09/2023     Type II MI as noted by Detection of a rise and or fall of cTn values w/ at least one value above the 99th % URL (12ng/L) and evidence of an imbalance btwn myocardial oxygen supply and demand unrelated to acute coronary athero-thrombosis, requiring at least 1 of the following   Sxs of acute MI  TXT:     Cardiac consult  Trend [place]  Smoking cessation   [facility]   Cardiac rehab consult   Cardiac Diet   Repeat Lipid panel  TTE  MPI per cardiology   Heparin drip low ACS  Cardiac diet   Start Carvedilol 6.25mg PO BID to help with BP control, continue outpatient  Aspirin 81mg PO daily   Start atorvastatin 40mg PO daily continue outpatient

## 2024-05-07 NOTE — ASSESSMENT & PLAN NOTE
KDIGO guidelines define PEG as an increase in Creatinine of 0.3 mg/dL over a 48 hour period, or an increase in Creatinine (over 7 days) of greater than 1.5 times the baseline. Full PEG KDIGO criteria and staging info can be found in the PEG order set below.     Recent Labs     05/07/24  1047 05/08/24  0439   CREATININE 1.49* 1.22   EGFR 34 44     Estimated Creatinine Clearance: 39.6 mL/min (by C-G formula based on SCr of 1.22 mg/dL).    POA 1.49; (baseline 0.7-1.10)  Likely 2/2 hypertensive emergency  UA within normal limits    Plan:  PEG improving  Patient instructed to request BMP prescription from PCP for 5/10/2024 to monitor downward trend of creatinine   Patient instructed to maintain good hydration  Outpatient follow up with PCP

## 2024-05-07 NOTE — QUICK NOTE
Pt currently denies CP.  Repeat HS trop level down trending. Will DC IV heparin  Still planning on Carla MPI in tomorrow.

## 2024-05-07 NOTE — TELEPHONE ENCOUNTER
"Pt called in stating that her blood pressure has been elevated since yesterday at 233/95. This morning her BP was 205/104. Pt states she has been having headaches for the last couple of months. Pt also states that she had left sided chest pain yesterday, none today. Pt advised to go to ED. Pt ultimately agreeable. Pt to go to [facility].     Reason for Disposition   Systolic BP >= 160 OR Diastolic >= 100, and any cardiac or neurologic symptoms (e.g., chest pain, difficulty breathing, unsteady gait, blurred vision)    Answer Assessment - Initial Assessment Questions  1. BLOOD PRESSURE: \"What is the blood pressure?\" \"Did you take at least two measurements 5 minutes apart?\"      Yesterday 223/95, 210/111 today 205/104  2. ONSET: \"When did you take your blood pressure?\"      Yesterday  and today  3. HOW: \"How did you obtain the blood pressure?\" (e.g., visiting nurse, automatic home BP monitor)      Home BP monitor  4. HISTORY: \"Do you have a history of high blood pressure?\"      Yes, year ago ended up in hospital  5. MEDICATIONS: \"Are you taking any medications for blood pressure?\" \"Have you missed any doses recently?\"      Denies  6. OTHER SYMPTOMS: \"Do you have any symptoms?\" (e.g., headache, chest pain, blurred vision, difficulty breathing, weakness)      Headache for \"months\", left chest pain.  7. PREGNANCY: \"Is there any chance you are pregnant?\" \"When was your last menstrual period?\"      NA    Protocols used: Blood Pressure - High-ADULT-[place]    "

## 2024-05-07 NOTE — LETTER
Dear Dr. Lundy,     My name is Whit Hagan, I am a resident physician taking care of your patient Alyse Gray in the hospital.  We have treated her for hypertensive emergency, complicated by PEG.  We have recommended that she request an order for a follow-up BMP from you for 5/10/2024 to make sure her creatinine is trending down.  We recommended that she record her blood pressures at home, and bring a log to her follow-up visit with you.  We also started her on Coreg 6.25 mg twice daily.  And we have advised her to schedule a follow-up appointment with you in the next week.    Thank you!    Sincerely,  Whit Hagan MD  PGY-1 Transitional Year Resident

## 2024-05-07 NOTE — ASSESSMENT & PLAN NOTE
KDIGO guidelines define PEG as an increase in Creatinine of 0.3 mg/dL over a 48 hour period, or an increase in Creatinine (over 7 days) of greater than 1.5 times the baseline. Full PEG KDIGO criteria and staging info can be found in the PEG order set below.     Recent Labs     05/07/24  1047   CREATININE 1.49*   EGFR 34     Estimated Creatinine Clearance: 32.7 mL/min (A) (by C-G formula based on SCr of 1.49 mg/dL (H)).    POA 1.49; (baseline 0.7-1.10)  Likely 2/2 hypertension    Plan:  IV Fluids gentle fluids in setting of HTN 75ml/Hr IVNS  Monitor BMP daily and observe for downward trend of creatinine  Avoid hypoperfusion of the kidneys, minimize nephrotoxins  RAAS Blockers/Diuretics held: None pt not taking

## 2024-05-07 NOTE — H&P
[facility]  H&P  Name: Alyse Gray 74 y.o. female I MRN: [id number]  Unit/Bed#: ED-10 I Date of Admission: 5/7/2024   Date of Service: 5/7/2024 I Hospital Day: 0      Assessment/Plan   * Elevated troponin level not due myocardial infarction  Assessment & Plan  Lab Results   Component Value Date    HSTNI0 133 (H) 05/07/2024    HSTNI2 129 (H) 05/07/2024    HSTNID2 -4 05/07/2024     Lab Results   Component Value Date    GHQ0OERCTOHY 2.409 06/09/2023    FREET4 1.64 (H) 03/23/2022     Lab Results   Component Value Date    CHOLESTEROL 200 06/09/2023    TRIG 113 06/09/2023    HDL 46 (L) 06/09/2023    LDLCALC 131 (H) 06/09/2023     Type II MI as noted by Detection of a rise and or fall of cTn values w/ at least one value above the 99th % URL (12ng/L) and evidence of an imbalance btwn myocardial oxygen supply and demand unrelated to acute coronary athero-thrombosis, requiring at least 1 of the following   Sxs of acute MI  TXT:     Cardiac consult  Trend Trops  Smoking cessation   Ambulatory referal to caridology   Cardiac rehab consult   Cardiac Diet   Repeat Lipid panel  TTE  MPI per cardiology   Heparin drip low ACS  Cardiac diet   Start Carvedilol 6.25mg PO BID to help with BP control, continue outpatient  Aspirin 81mg PO daily   Start atorvastatin 40mg PO daily continue outpatient              Hypertensive emergency  Assessment & Plan  Blood Pressure: 138/94      Plan:  Continue Carvedilol 6/25mg PO BID  Monitor blood pressure  Tele  Hydralazine 10mg IV q6hrs for SBP>160 and/or DBP>100 if HR <70  Labetalol 10mg IV q6hrs for SBP>160 and/or DBP> 100 if HR >70  Smoking cessation   Follow up with cardiology outpt                 PEG (acute kidney injury) (HCC)  Assessment & Plan  KDIGO guidelines define PEG as an increase in Creatinine of 0.3 mg/dL over a 48 hour period, or an increase in Creatinine (over 7 days) of greater than 1.5 times the baseline. Full PEG KDIGO criteria and staging info  "can be found in the PEG order set below.     Recent Labs     05/07/24  1047   CREATININE 1.49*   EGFR 34       Estimated Creatinine Clearance: 32.7 mL/min (A) (by C-G formula based on SCr of 1.49 mg/dL (H)).    POA 1.49; (baseline 0.7-1.10)  Likely 2/2 hypertension    Plan:  IV Fluids gentle fluids in setting of HTN 75ml/Hr IVNS  U/A to look for RBCs and cast   Monitor BMP daily and observe for downward trend of creatinine  Avoid hypoperfusion of the kidneys, minimize nephrotoxins  RAAS Blockers/Diuretics held: None pt not taking             VTE Pharmacologic Prophylaxis: VTE Score: 4 High Risk (Score >/= 5) - Pharmacological DVT Prophylaxis Ordered: heparin drip. Sequential Compression Devices Ordered.  Code Status: Prior Full   Discussion with family: Patient declined call to .     Anticipated Length of Stay: Patient will be admitted on an inpatient basis with an anticipated length of stay of greater than 2 midnights secondary to chest pain .    Chief Complaint: Chest pain     History of Present Illness:  Alyse Gray is a 74 y.o. female with a PMH of hypothyroidism who presents with elevated BP and CP.  [id number] When pt pain started pt was walking into work.  Pt friend said \"you dont look  good\". 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  BP was 223/99.  Pt left work came home and  went to sleep.  Chest pain was sharp upper left chest.  Non radiating 9/10. No  sweating no nausea.    When pt  woke up around 19:00 pain was gone.  Pt took BP and  reading  was 205/111.  Pt  sat done and watched TV. Pt went to be at 03:00 and got  up at 7:00.  Pt had  coffee and cig took BP and it was 227/116.  Pt called  Who said to come in.  Pt had chest pain on ride  over 4/10 in same place dull non radiating pain.  Pt also had sore neck and HA.  Pt denies chest pain sore neck during encounter.     Review of Systems:  Review of Systems   Constitutional:  Negative for fever and unexpected weight " change.   HENT: Negative.     Eyes: Negative.    Respiratory: Negative.     Cardiovascular:  Positive for chest pain. Negative for palpitations and leg swelling.   Gastrointestinal: Negative.    Genitourinary: Negative.    Musculoskeletal:  Positive for neck pain.   Skin: Negative.    Neurological:  Positive for headaches. Negative for dizziness, syncope, facial asymmetry, speech difficulty and light-headedness.       Past Medical and Surgical History:   Past Medical History:   Diagnosis Date    Disease of thyroid gland     Polymyalgia rheumatica (HCC)        Past Surgical History:   Procedure Laterality Date    CHOLECYSTECTOMY      FOOT SURGERY      GALLBLADDER SURGERY      SHOULDER ARTHROSCOPY Right        Meds/Allergies:  Prior to Admission medications    Medication Sig Start Date End Date Taking? Authorizing Provider   cholecalciferol (VITAMIN D3) 1,000 units tablet Take 1 tablet (1,000 Units total) by mouth daily 12/2/20  Yes Petty Lim MD   levothyroxine 112 mcg tablet Take 1 tablet by mouth once daily 4/8/24  Yes Venkatesh Lundy MD     I have reviewed home medications with patient personally.    Allergies:   Allergies   Allergen Reactions    Bee Venom     Naproxen Hives    Penicillins Hives    Vicodin  [Hydrocodone-Acetaminophen] Hives and Itching    Other Rash     Spider bite        Social History:  Marital Status: Single   Occupation: Retired works  part time  [employer]  Patient Pre-hospital Living Situation: Apartment  Patient Pre-hospital Level of Mobility: walks  Patient Pre-hospital Diet Restrictions: None  Substance Use History:   Social History     Substance and Sexual Activity   Alcohol Use No     Social History     Tobacco Use   Smoking Status Every Day    Current packs/day: 0.50    Average packs/day: 0.5 packs/day for 53.0 years (26.5 ttl pk-yrs)    Types: Cigarettes    Passive exposure: Current   Smokeless Tobacco Never     Social History     Substance and Sexual Activity    Drug Use No       Family History:  Family History   Problem Relation Age of Onset    Heart disease Mother     Diabetes Mother     Hypertension Mother     Breast cancer Sister     Colon cancer Brother     Heart disease Brother     Diabetes Brother     Hypertension Brother        Physical Exam:     Vitals:   Blood Pressure: 138/94 (05/07/24 1430)  Pulse: 91 (05/07/24 1430)  Temperature: 97.7 °F (36.5 °C) (05/07/24 1042)  Temp Source: Oral (05/07/24 1042)  Respirations: 20 (05/07/24 1230)  Weight - Scale: 77.7 kg (171 lb 3.2 oz) (05/07/24 1346)  SpO2: 96 % (05/07/24 1430)    Physical Exam  Constitutional:       General: She is not in acute distress.     Appearance: Normal appearance. She is not ill-appearing, toxic-appearing or diaphoretic.   HENT:      Head: Normocephalic and atraumatic.      Ears:      Comments: Bilateral Blevins sign     Nose: No congestion.      Mouth/Throat:      Mouth: Mucous membranes are moist.      Pharynx: Oropharynx is clear. No oropharyngeal exudate.      Comments: Missing teeth  Eyes:      General: No visual field deficit or scleral icterus.        Right eye: No discharge.         Left eye: No discharge.      Conjunctiva/sclera: Conjunctivae normal.   Cardiovascular:      Rate and Rhythm: Normal rate and regular rhythm.      Comments: Thread pulse in Left upper extremity   2+ pulse inf right upper extremity  Pulmonary:      Effort: Pulmonary effort is normal. No respiratory distress.      Breath sounds: No stridor. No wheezing, rhonchi or rales.   Chest:      Chest wall: No tenderness.   Abdominal:      General: There is no distension.      Tenderness: There is no abdominal tenderness. There is no guarding or rebound.   Musculoskeletal:      Cervical back: Normal range of motion and neck supple. No rigidity or tenderness.      Right lower leg: No edema.      Left lower leg: No edema.   Lymphadenopathy:      Cervical: No cervical adenopathy.   Skin:     Capillary Refill: Capillary refill  takes less than 2 seconds.   Neurological:      Mental Status: She is alert and oriented to person, place, and time.      GCS: GCS eye subscore is 4. GCS verbal subscore is 5. GCS motor subscore is 6.      Cranial Nerves: Cranial nerves 2-12 are intact. No cranial nerve deficit, dysarthria or facial asymmetry.      Motor: No weakness or tremor.      Coordination: Coordination normal. Finger-Nose-Finger Test and Heel to Shin Test normal.   Psychiatric:         Behavior: Behavior normal.          Additional Data:     Lab Results:  Results from last 7 days   Lab Units 05/07/24  1047   WBC Thousand/uL 9.48   HEMOGLOBIN g/dL 15.6*   HEMATOCRIT % 47.3*   PLATELETS Thousands/uL 199   SEGS PCT % 63   LYMPHO PCT % 25   MONO PCT % 8   EOS PCT % 2     Results from last 7 days   Lab Units 05/07/24  1047   SODIUM mmol/L 136   POTASSIUM mmol/L 4.7   CHLORIDE mmol/L 102   CO2 mmol/L 28   BUN mg/dL 37*   CREATININE mg/dL 1.49*   ANION GAP mmol/L 6   CALCIUM mg/dL 9.9   ALBUMIN g/dL 4.3   TOTAL BILIRUBIN mg/dL 0.37   ALK PHOS U/L 66   ALT U/L 19   AST U/L 20   GLUCOSE RANDOM mg/dL 97     Results from last 7 days   Lab Units 05/07/24  1325   INR  0.93                   Lines/Drains:  Invasive Devices       Peripheral Intravenous Line  Duration             Peripheral IV 05/07/24 Right Antecubital <1 day                        Imaging: Reviewed radiology reports from this admission including: chest xray  XR chest 2 views   ED Interpretation by Dianne Greco MD (05/07 7273)   No acute cardiopulmonary disease, no wide mediastinum          EKG and Other Studies Reviewed on Admission:   EKG: NSR. HR 75.  Nutrition Assessment and Intervention:     Ordered nutritional assessment labs      Physical Activity Assessment and Intervention:    Exercise capacity assessment recommended    Physical Activity Prescription completed with patient    Referral given to exercise professional      Emotional and Mental Well-being, Sleep, Connectedness  Assessment and Intervention:    Sleep/stress assessment performed      Tobacco and Toxic Substance Assessment and Intervention:     Tobacco use screening performed    Alcohol and drug use screening performed       ** Please Note: This note has been constructed using a voice recognition system. **

## 2024-05-08 ENCOUNTER — APPOINTMENT (INPATIENT)
Dept: NUCLEAR MEDICINE | Facility: HOSPITAL | Age: 74
DRG: 305 | End: 2024-05-08
Payer: MEDICARE

## 2024-05-08 ENCOUNTER — APPOINTMENT (INPATIENT)
Dept: NON INVASIVE DIAGNOSTICS | Facility: HOSPITAL | Age: 74
DRG: 305 | End: 2024-05-08
Payer: MEDICARE

## 2024-05-08 VITALS
RESPIRATION RATE: 18 BRPM | WEIGHT: 171.3 LBS | TEMPERATURE: 98 F | HEART RATE: 65 BPM | HEIGHT: 62 IN | OXYGEN SATURATION: 93 % | BODY MASS INDEX: 31.52 KG/M2 | DIASTOLIC BLOOD PRESSURE: 83 MMHG | SYSTOLIC BLOOD PRESSURE: 120 MMHG

## 2024-05-08 LAB
ANION GAP SERPL CALCULATED.3IONS-SCNC: 5 MMOL/L (ref 4–13)
AORTIC ROOT: 2.4 CM
APICAL FOUR CHAMBER EJECTION FRACTION: 68 %
ASCENDING AORTA: 2.5 CM
BSA FOR ECHO PROCEDURE: 1.79 M2
BUN SERPL-MCNC: 33 MG/DL (ref 5–25)
CALCIUM SERPL-MCNC: 8.6 MG/DL (ref 8.4–10.2)
CHEST PAIN STATEMENT: NORMAL
CHLORIDE SERPL-SCNC: 107 MMOL/L (ref 96–108)
CO2 SERPL-SCNC: 24 MMOL/L (ref 21–32)
CREAT SERPL-MCNC: 1.22 MG/DL (ref 0.6–1.3)
E WAVE DECELERATION TIME: 275 MS
E/A RATIO: 0.72
FRACTIONAL SHORTENING: 34 (ref 28–44)
GFR SERPL CREATININE-BSD FRML MDRD: 44 ML/MIN/1.73SQ M
GLUCOSE SERPL-MCNC: 88 MG/DL (ref 65–140)
INTERVENTRICULAR SEPTUM IN DIASTOLE (PARASTERNAL SHORT AXIS VIEW): 1.2 CM
INTERVENTRICULAR SEPTUM: 1.2 CM (ref 0.6–1.1)
LAAS-AP2: 16.7 CM2
LAAS-AP4: 17.8 CM2
LEFT ATRIUM AREA SYSTOLE SINGLE PLANE A4C: 18.3 CM2
LEFT ATRIUM SIZE: 3.6 CM
LEFT ATRIUM VOLUME (MOD BIPLANE): 49 ML
LEFT ATRIUM VOLUME INDEX (MOD BIPLANE): 27.4 ML/M2
LEFT INTERNAL DIMENSION IN SYSTOLE: 2.3 CM (ref 2.1–4)
LEFT VENTRICULAR INTERNAL DIMENSION IN DIASTOLE: 3.5 CM (ref 3.5–6)
LEFT VENTRICULAR POSTERIOR WALL IN END DIASTOLE: 1.2 CM
LEFT VENTRICULAR STROKE VOLUME: 32 ML
LVSV (TEICH): 32 ML
MAX DIASTOLIC BP: 82 MMHG
MAX PREDICTED HEART RATE: 147 BPM
MV E'TISSUE VEL-SEP: 5 CM/S
MV PEAK A VEL: 1.29 M/S
MV PEAK E VEL: 93 CM/S
MV STENOSIS PRESSURE HALF TIME: 80 MS
MV VALVE AREA P 1/2 METHOD: 2.75
NUC STRESS EJECTION FRACTION: 73 %
POTASSIUM SERPL-SCNC: 4 MMOL/L (ref 3.5–5.3)
PROTOCOL NAME: NORMAL
RA PRESSURE ESTIMATED: 3 MMHG
RATE PRESSURE PRODUCT: 214
REASON FOR TERMINATION: NORMAL
RIGHT ATRIUM AREA SYSTOLE A4C: 10.3 CM2
RIGHT VENTRICLE ID DIMENSION: 2.7 CM
RV PSP: 39 MMHG
SL CV LEFT ATRIUM LENGTH A2C: 5.1 CM
SL CV LV EF: 60
SL CV PED ECHO LEFT VENTRICLE DIASTOLIC VOLUME (MOD BIPLANE) 2D: 51 ML
SL CV PED ECHO LEFT VENTRICLE SYSTOLIC VOLUME (MOD BIPLANE) 2D: 19 ML
SL CV REST NUCLEAR ISOTOPE DOSE: 11 MCI
SL CV STRESS NUCLEAR ISOTOPE DOSE: 32.6 MCI
SL CV STRESS RECOVERY BP: NORMAL MMHG
SL CV STRESS RECOVERY HR: 89 BPM
SL CV STRESS RECOVERY O2 SAT: 96 %
SODIUM SERPL-SCNC: 136 MMOL/L (ref 135–147)
STRESS ANGINA INDEX: 0
STRESS BASELINE BP: NORMAL MMHG
STRESS BASELINE HR: 65 BPM
STRESS O2 SAT REST: 95 %
STRESS PEAK HR: 107 BPM
STRESS POST EXERCISE DUR MIN: 3 MIN
STRESS POST EXERCISE DUR SEC: 0 SEC
STRESS POST O2 SAT PEAK: 98 %
STRESS POST PEAK BP: 2 MMHG
STRESS POST PEAK HR: 121 BPM
STRESS POST PEAK SYSTOLIC BP: 162 MMHG
STRESS/REST PERFUSION RATIO: 1.06
T4 FREE SERPL-MCNC: 1.04 NG/DL (ref 0.61–1.12)
TARGET HR FORMULA: NORMAL
TEST INDICATION: NORMAL
TR MAX PG: 36 MMHG
TR PEAK VELOCITY: 3 M/S
TRICUSPID ANNULAR PLANE SYSTOLIC EXCURSION: 2 CM
TRICUSPID VALVE PEAK REGURGITATION VELOCITY: 3.02 M/S

## 2024-05-08 PROCEDURE — 93016 CV STRESS TEST SUPVJ ONLY: CPT | Performed by: INTERNAL MEDICINE

## 2024-05-08 PROCEDURE — 93306 TTE W/DOPPLER COMPLETE: CPT | Performed by: INTERNAL MEDICINE

## 2024-05-08 PROCEDURE — 80048 BASIC METABOLIC PNL TOTAL CA: CPT

## 2024-05-08 PROCEDURE — 93017 CV STRESS TEST TRACING ONLY: CPT

## 2024-05-08 PROCEDURE — 93018 CV STRESS TEST I&R ONLY: CPT | Performed by: INTERNAL MEDICINE

## 2024-05-08 PROCEDURE — 99239 HOSP IP/OBS DSCHRG MGMT >30: CPT | Performed by: INTERNAL MEDICINE

## 2024-05-08 PROCEDURE — 78452 HT MUSCLE IMAGE SPECT MULT: CPT | Performed by: INTERNAL MEDICINE

## 2024-05-08 PROCEDURE — 99232 SBSQ HOSP IP/OBS MODERATE 35: CPT | Performed by: INTERNAL MEDICINE

## 2024-05-08 PROCEDURE — A9502 TC99M TETROFOSMIN: HCPCS

## 2024-05-08 PROCEDURE — 78452 HT MUSCLE IMAGE SPECT MULT: CPT

## 2024-05-08 PROCEDURE — 93306 TTE W/DOPPLER COMPLETE: CPT

## 2024-05-08 RX ORDER — CARVEDILOL 6.25 MG/1
6.25 TABLET ORAL 2 TIMES DAILY WITH MEALS
Qty: 60 TABLET | Refills: 0 | Status: SHIPPED | OUTPATIENT
Start: 2024-05-08 | End: 2024-06-07

## 2024-05-08 RX ORDER — REGADENOSON 0.08 MG/ML
0.4 INJECTION, SOLUTION INTRAVENOUS ONCE
Status: COMPLETED | OUTPATIENT
Start: 2024-05-08 | End: 2024-05-08

## 2024-05-08 RX ADMIN — REGADENOSON 0.4 MG: 0.08 INJECTION, SOLUTION INTRAVENOUS at 09:39

## 2024-05-08 RX ADMIN — ASPIRIN 81 MG 81 MG: 81 TABLET ORAL at 10:41

## 2024-05-08 RX ADMIN — LEVOTHYROXINE SODIUM 112 MCG: 112 TABLET ORAL at 05:34

## 2024-05-08 RX ADMIN — Medication 1000 UNITS: at 10:41

## 2024-05-08 RX ADMIN — CARVEDILOL 6.25 MG: 6.25 TABLET, FILM COATED ORAL at 10:41

## 2024-05-08 NOTE — PROGRESS NOTES
"General Cardiology   Progress Note -  [facility]   Alyse Gray 74 y.o. female MRN: [id number]    Unit/Bed#: S -01 Encounter: [id number]    Assessment/ Plan    Chest pain likely in the setting of #2   HS troponin 133-->129-->129  Nuclear stress test today reviewed by attending showing no evidence of ischemia. Final read pending    Reviewed ECGs showing NSR with RBBB (Chronic)   Echocardiogram reviewed showing EF 60% and grade I diastolic dysfunction    2. Accelerated hypertension   BP POA: 192/92 and peaked at 201/93  BP average today: 146/80  On coreg 6.25 mg PO BID (new medication this admission). Continue at discharge     3. CKD stage III   Creatinine 1.22     4. Dyslipidemia     On atorvastatin 40 mg PO daily (new this admission) Recommend at discharge     5. Tobacco abuse   Counseled on cessation   Patient has no interest     6. Hypothyroidism   TSH 2.4  On levothyroxine 112 mcg PO daily     Patient is stable for discharge today. She will follow up with her PCP.     Subjective  Patient eager to go home today. She denies further chest pain. No SOB or palpitations. No fever or chills.     Review of Systems   Constitutional: Negative for chills and fever.   HENT:  Negative for congestion.    Cardiovascular:  Negative for chest pain, dyspnea on exertion, leg swelling, orthopnea and palpitations.   Respiratory:  Negative for shortness of breath.    Musculoskeletal:  Negative for falls.   Gastrointestinal:  Negative for bloating.   Neurological:  Negative for dizziness and light-headedness.   Psychiatric/Behavioral:  Negative for altered mental status.    All other systems reviewed and are negative.      Objective:   Vitals: Blood pressure 120/83, pulse 66, temperature 98 °F (36.7 °C), resp. rate 18, height 5' 2\" (1.575 m), weight 77.7 kg (171 lb 3.2 oz), SpO2 93%.,       Body mass index is 31.31 kg/m².,     Systolic (24hrs), Av , Min:90 , Max:201     Diastolic (24hrs), Av, Min:59, " Max:94          Intake/Output Summary (Last 24 hours) at 5/8/2024 0913  Last data filed at 5/8/2024 0700  Gross per 24 hour   Intake 1019.5 ml   Output --   Net 1019.5 ml     Weight (last 2 days)       Date/Time Weight    05/07/24 1346 77.7 (171.2)            Physical Exam  Constitutional:       General: She is not in acute distress.     Appearance: She is obese.   HENT:      Head: Normocephalic.      Mouth/Throat:      Mouth: Mucous membranes are moist.   Cardiovascular:      Rate and Rhythm: Normal rate and regular rhythm.   Pulmonary:      Effort: Pulmonary effort is normal. No respiratory distress.      Comments: Course   RA   Abdominal:      General: Bowel sounds are normal.      Palpations: Abdomen is soft.   Musculoskeletal:         General: No swelling. Normal range of motion.      Cervical back: Neck supple.   Skin:     General: Skin is warm and dry.      Capillary Refill: Capillary refill takes less than 2 seconds.   Neurological:      General: No focal deficit present.      Mental Status: She is alert and oriented to person, place, and time.   Psychiatric:         Mood and Affect: Mood normal.         LABORATORY RESULTS      CBC with diff:   Results from last 7 days   Lab Units 05/07/24  1047   WBC Thousand/uL 9.48   HEMOGLOBIN g/dL 15.6*   HEMATOCRIT % 47.3*   MCV fL 99*   PLATELETS Thousands/uL 199   RBC Million/uL 4.79   MCH pg 32.6   MCHC g/dL 33.0   RDW % 13.5   MPV fL 12.3   NRBC AUTO /100 WBCs 0       CMP:  Results from last 7 days   Lab Units 05/08/24  0439 05/07/24  1047   POTASSIUM mmol/L 4.0 4.7   CHLORIDE mmol/L 107 102   CO2 mmol/L 24 28   BUN mg/dL 33* 37*   CREATININE mg/dL 1.22 1.49*   CALCIUM mg/dL 8.6 9.9   AST U/L  --  20   ALT U/L  --  19   ALK PHOS U/L  --  66   EGFR ml/min/1.73sq m 44 34       BMP:  Results from last 7 days   Lab Units 05/08/24  0439 05/07/24  1047   POTASSIUM mmol/L 4.0 4.7   CHLORIDE mmol/L 107 102   CO2 mmol/L 24 28   BUN mg/dL 33* 37*   CREATININE mg/dL 1.22  "1.49*   CALCIUM mg/dL 8.6 9.9       No results found for: \"NTBNP\"                        Results from last 7 days   Lab Units 05/07/24  1047   TSH 3RD GENERATON uIU/mL 4.862*   FREE T4 ng/dL 1.04       Results from last 7 days   Lab Units 05/07/24  1325   INR  0.93       Lipid Profile:   No results found for: \"CHOL\"  Lab Results   Component Value Date    HDL 46 (L) 06/09/2023    HDL 40 (L) 11/23/2022    HDL 36 (L) 03/23/2022     Lab Results   Component Value Date    LDLCALC 131 (H) 06/09/2023    LDLCALC 124 (H) 11/23/2022    LDLCALC 97 03/23/2022     Lab Results   Component Value Date    TRIG 113 06/09/2023    TRIG 114 11/23/2022    TRIG 112 03/23/2022       Cardiac testing:   No results found for this or any previous visit.    No results found for this or any previous visit.    No results found for this or any previous visit.    No valid procedures specified.  No results found for this or any previous visit.        Meds/Allergies   all current active meds have been reviewed and current meds:   Current Facility-Administered Medications   Medication Dose Route Frequency    aluminum-magnesium hydroxide-simethicone (MAALOX) oral suspension 30 mL  30 mL Oral Q6H PRN    aspirin chewable tablet 81 mg  81 mg Oral Daily    atorvastatin (LIPITOR) tablet 40 mg  40 mg Oral Daily With Dinner    carvedilol (COREG) tablet 6.25 mg  6.25 mg Oral BID With Meals    Cholecalciferol (VITAMIN D3) tablet 1,000 Units  1,000 Units Oral Daily    hydrALAZINE (APRESOLINE) injection 10 mg  10 mg Intravenous Q6H PRN    labetalol (NORMODYNE) injection 10 mg  10 mg Intravenous Q6H PRN    levothyroxine tablet 112 mcg  112 mcg Oral Early Morning    ondansetron (ZOFRAN) injection 4 mg  4 mg Intravenous Q6H PRN    senna (SENOKOT) tablet 8.6 mg  1 tablet Oral HS PRN     Medications Prior to Admission   Medication    cholecalciferol (VITAMIN D3) 1,000 units tablet    levothyroxine 112 mcg tablet       Counseling / Coordination of Care  Total floor / " unit time spent today 20 minutes.  Greater than 50% of total time was spent with the patient and / or family counseling and / or coordination of care.      ** Please Note: Dragon 360 Dictation voice to text software may have been used in the creation of this document. **

## 2024-05-08 NOTE — ASSESSMENT & PLAN NOTE
Encouraged tobacco cessation.  Nicotine patch was offered, patient declined.    Plan:  Outpatient follow-up with PCP

## 2024-05-08 NOTE — DISCHARGE INSTR - AVS FIRST PAGE
Dear Alyse Gray,     It was our pleasure to care for you here at [facility].  It is our hope that we were always able to exceed the expected standards for your care during your stay.  You were hospitalized due to chest pain and hypertensive emergency.  You were cared for on the 3rd floor by Whit Hagan MD under the service of Zoila Moncada MD with the [facility] who covers for your primary care physician (PCP), Venkatesh Lundy MD, while you were hospitalized.  If you have any questions or concerns related to this hospitalization, you may contact us at .  For follow up as well as any medication refills, we recommend that you follow up with your primary care physician.  A registered nurse will reach out to you by phone within a few days after your discharge to answer any additional questions that you may have after going home.  However, at this time we provide for you here, the most important instructions / recommendations at discharge:     Notable Medication Adjustments -   Please start taking carvedilol (Coreg) 6.25 mg tablet: Take 1 tablet (6.25 mg total) 2 times a day with meals  Please continue to take all of your other medications as prescribed  Testing Required after Discharge -   BMP on 5/10/2024  ** Please contact your PCP to request testing orders for any of the testing recommended here **  Important follow up information -   Please call to make a follow-up appointment with your PCP within 1 week of discharge  Please call your PCP to request ordering the blood work listed above  Other Instructions -   Please check your blood pressures at home twice per day, and keep a record of these values to bring to your follow-up appointment with your PCP  Please make sure to drink plenty of water at home and stay hydrated  Please see attached for resources for quitting smoking. Please ask your PCP about smoking cessation.    Please return to the emergency department if you have any worsening symptoms, including but not limited to: Chest pain, palpitations, dizziness, lightheadedness, or fainting.   Please review this entire after visit summary as additional general instructions including medication list, appointments, activity, diet, any pertinent wound care, and other additional recommendations from your care team that may be provided for you.      Sincerely,     Whit Hagan MD

## 2024-05-08 NOTE — DISCHARGE SUMMARY
[facility]  Discharge- Alyse Gray 1950, 74 y.o. female MRN: [id number]  Unit/Bed#: S -01 Encounter: [id number]  Primary Care Provider: Venkatesh Lundy MD   Date and time admitted to hospital: 5/7/2024 11:09 AM    * Elevated troponin level not due myocardial infarction  Assessment & Plan  Troponin 133>129>129  Likely type II MI, likely due to hypertensive emergency.  Troponin trend flat, Low suspicion for ACS.  TSH elevated, free T4 normal  LDL  Cardiology consulted, recommended Echo and pharmacologic stress test  Echo: LVEF 60%, normal systolic function, G1DD  Stress Test: LV perfusion is normal     Plan:  Carvedilol 6.25mg PO BID started while inpatient, to be continued on discharge  Patient instructed to monitor BP at home, and keep a log for her PCP visit  Cardiology recommended continuing Atorvastatin 40mg, started while inpatient. However, the patient is hesitant to start more medications. So plan to discuss initiating a statin with her PCP at follow up.   Smoking cessation encouraged  Outpatient follow up with PCP within 1 week    Hypertensive emergency  Assessment & Plan  BP elevated to as high as 201/93, also with PEG  Latest Blood Pressure: 120/83  BP now well-controlled on carvedilol      Plan:  Start Carvedilol 6.25 mg PO BID, prescription sent to pharmacy   Patient instructed to monitor blood pressure at home, bring records to PCP visit  Continue to encourage smoking cessation   Outpatient follow up with PCP    PEG (acute kidney injury) (HCC)  Assessment & Plan  KDIGO guidelines define PEG as an increase in Creatinine of 0.3 mg/dL over a 48 hour period, or an increase in Creatinine (over 7 days) of greater than 1.5 times the baseline. Full PEG KDIGO criteria and staging info can be found in the PGE order set below.     Recent Labs     05/07/24  1047 05/08/24  0439   CREATININE 1.49* 1.22   EGFR 34 44     Estimated Creatinine Clearance: 39.6 mL/min (by  C-G formula based on SCr of 1.22 mg/dL).    POA 1.49; (baseline 0.7-1.10)  Likely 2/2 hypertensive emergency  UA within normal limits    Plan:  PEG improving  Patient instructed to request BMP prescription from PCP for 5/10/2024 to monitor downward trend of creatinine   Patient instructed to maintain good hydration  Outpatient follow up with PCP      Tobacco abuse  Assessment & Plan  Encouraged tobacco cessation.  Nicotine patch was offered, patient declined.    Plan:  Outpatient follow-up with PCP        Medical Problems       Resolved Problems  Date Reviewed: 5/7/2024   None       Discharging Resident: Whit Hagan MD  Discharging Attending: No att. providers found  PCP: Venkatesh Lundy MD  Admission Date:   Admission Orders (From admission, onward)       Ordered        05/07/24 1408  INPATIENT ADMISSION  Once                          Discharge Date: 05/08/24    Consultations During Hospital Stay:  Cardiology    Procedures Performed:   Transthoracic echocardiogram  Pharmacologic stress test    Significant Findings / Test Results:   NM myocardial perfusion spect (rx stress and/or rest)     Stress ECG: No ST deviation is noted. The ECG was not diagnostic due to pharmacological (vasodilator) stress. The stress ECG is equivocal for ischemia after pharmacologic vasodilation.    Stress Function: Left ventricular function post-stress is normal. Stress ejection fraction is 73%.    Perfusion: There are no perfusion defects.    Stress Combined Conclusion: Left ventricular perfusion is normal.    Echocardiogram 5/8/2024:    Left Ventricle: Left ventricular cavity size is normal. Wall thickness is mildly increased. There is mild concentric hypertrophy. The left ventricular ejection fraction is 60%. Systolic function is normal. Wall motion is normal. Diastolic function is mildly abnormal, consistent with grade I (abnormal) relaxation.    IVS: There is sigmoid appearance of the septum.    Tricuspid Valve: The right  ventricular systolic pressure is mildly elevated. The estimated right ventricular systolic pressure is 39.00 mmHg.    Lab Results   Component Value Date    HSTNI0 133 (H) 05/07/2024    HSTNI2 129 (H) 05/07/2024    HSTNID2 -4 05/07/2024    HSTNI4 129 (H) 05/07/2024    HSTNID4 -4 05/07/2024     Lab Results   Component Value Date    VRE0KIEJHQLV 4.862 (H) 05/07/2024    FREET4 1.04 05/07/2024     Lab Results   Component Value Date    CHOLESTEROL 200 06/09/2023    TRIG 113 06/09/2023    HDL 46 (L) 06/09/2023    LDLCALC 131 (H) 06/09/2023     XR chest 2 views  Result Date: 5/8/2024  Impression: No acute cardiopulmonary disease. Workstation performed: [id number]     Incidental Findings:   None    Test Results Pending at Discharge (will require follow up):  None     Outpatient Tests Requested:  [facility] on 5/10/2024    Complications:  None    Reason for Admission: Troponin elevation and hypertensive emergency     Hospital Course:   Alyse Gray is a 74 y.o. female patient who originally presented to the hospital on 5/7/2024 due to chest pain and elevated blood pressure. In the ED troponins were elevated to 133>129>129. BP was as high as 201/93. She also had PEG on admission, as creatinine was elevated to 1.49 from a baseline of 0.7 to 1. Chest pain resolved in the ED. No ischemic changes on EKG. She was started on a heparin drip and cardiology was consulted. Heparin drip was discontinued due to low suspicion for ACS, as troponin trend was flat. Suspect troponin elevation was due to hypertensive emergency. She was started on carvedilol 6.25mg BID, with improved BP control. Echo showed normal systolic function and grade 1 diastolic dysfunction. There is mild pulmonary hypertension. Pharmacologic stress test was negative for LV perfusion defects. The patient is stable for discharge home. She will be continued on carvedilol for discharge. The patient is hesitant to start more medications at this time, so will discuss initiating  "statin with PCP. Cr improved from 1.49 to 1.22 on day of discharge. She was instructed to maintain good hydration, and request prescription for BMP from PCP to ensure continued downward trend of Cr. The patient will follow up with PCP in 1 week. Per cardiology, there is no need for cardiology follow-up at this time.     Please see above list of diagnoses and related plan for additional information.     Condition at Discharge: good    Discharge Day Visit / Exam:   Subjective:  The patient was seen and examined sitting up in bed this morning. She says that she feels well and her chest pain has resolved. She would like to go home as soon as possible. She notes that her blood pressure is always high at doctor's appointments, but is normal at home. Discussed keeping a record of home blood pressures to bring to her PCP. She agreed, and says she has done this in the past. She denies fever, chills, headaches, dizziness, lightheadedness, chest pain, palpitations, SOB, abdominal pain, N/V/D, constipation, or changes in urination. Smoking cessation was discussed, and patient declined nicotine patch. All questions answered.   Vitals: Blood Pressure: 120/83 (05/08/24 0715)  Pulse: 65 (05/08/24 0715)  Temperature: 98 °F (36.7 °C) (05/07/24 2135)  Temp Source: Oral (05/07/24 1042)  Respirations: 18 (05/07/24 2100)  Height: 5' 2\" (157.5 cm) (05/08/24 0715)  Weight - Scale: 77.7 kg (171 lb 4.8 oz) (05/08/24 0715)  SpO2: 93 % (05/07/24 2135)  Exam:   Physical Exam  Vitals and nursing note reviewed.   Constitutional:       General: She is not in acute distress.     Appearance: Normal appearance.   HENT:      Head: Normocephalic and atraumatic.      Right Ear: External ear normal.      Left Ear: External ear normal.      Mouth/Throat:      Mouth: Mucous membranes are moist.      Pharynx: Oropharynx is clear.   Eyes:      General: No scleral icterus.     Conjunctiva/sclera: Conjunctivae normal.   Cardiovascular:      Rate and Rhythm: " Normal rate and regular rhythm.   Pulmonary:      Effort: Pulmonary effort is normal.      Breath sounds: Normal breath sounds.   Abdominal:      General: Bowel sounds are normal. There is no distension.      Tenderness: There is no abdominal tenderness. There is no guarding.   Musculoskeletal:      Cervical back: Neck supple.      Right lower leg: No edema.      Left lower leg: No edema.   Skin:     General: Skin is warm and dry.   Neurological:      General: No focal deficit present.      Mental Status: She is alert and oriented to person, place, and time. Mental status is at baseline.      Motor: No weakness.   Psychiatric:         Mood and Affect: Mood normal.        Discussion with Family: Patient declined call to .     Discharge instructions/Information to patient and family:   See after visit summary for information provided to patient and family.      Provisions for Follow-Up Care:  See after visit summary for information related to follow-up care and any pertinent home health orders.      Mobility at time of Discharge:   Basic Mobility Inpatient Raw Score: 24  JH-HLM Goal: 8: Walk 250 feet or more  JH-HLM Achieved: 8: Walk 250 feet ot more  HLM Goal achieved. Continue to encourage appropriate mobility.     Disposition:   Home    Planned Readmission: No    Discharge Medications:  See after visit summary for reconciled discharge medications provided to patient and/or family.      **Please Note: This note may have been constructed using a voice recognition system**

## 2024-05-09 ENCOUNTER — TELEPHONE (OUTPATIENT)
Dept: FAMILY MEDICINE CLINIC | Facility: CLINIC | Age: 74
End: 2024-05-09

## 2024-05-09 ENCOUNTER — TRANSITIONAL CARE MANAGEMENT (OUTPATIENT)
Dept: FAMILY MEDICINE CLINIC | Facility: CLINIC | Age: 74
End: 2024-05-09

## 2024-05-09 LAB
CHEST PAIN STATEMENT: NORMAL
MAX DIASTOLIC BP: 82 MMHG
MAX PREDICTED HEART RATE: 147 BPM
PROTOCOL NAME: NORMAL
REASON FOR TERMINATION: NORMAL
STRESS POST EXERCISE DUR MIN: 3 MIN
STRESS POST EXERCISE DUR SEC: 0 SEC
STRESS POST PEAK HR: 121 BPM
STRESS POST PEAK SYSTOLIC BP: 162 MMHG
TARGET HR FORMULA: NORMAL
TEST INDICATION: NORMAL

## 2024-05-09 NOTE — TELEPHONE ENCOUNTER
Pt called and made TCM. She would like her labs looked at. She is concerned with all the abnormal values. Most concerned with wording that she is stage 3 kidney disease and TSH. However she would like all  the abnormal values reviewed/

## 2024-05-09 NOTE — ED ATTENDING ATTESTATION
5/7/2024  I, Whit Temple MD, saw and evaluated the patient. I have discussed the patient with the resident/non-physician practitioner and agree with the resident's/non-physician practitioner's findings, Plan of Care, and MDM as documented in the resident's/non-physician practitioner's note, except where noted. All available labs and Radiology studies were reviewed.  I was present for key portions of any procedure(s) performed by the resident/non-physician practitioner and I was immediately available to provide assistance.       At this point I agree with the current assessment done in the Emergency Department.  I have conducted an independent evaluation of this patient a history and physical is as follows:    ED Course         Critical Care Time  Procedures

## 2024-05-09 NOTE — TELEPHONE ENCOUNTER
Called patient and advised   She wants you to know that her BP was 146 /80 today. She is taking her pills as she is supposed to

## 2024-05-20 ENCOUNTER — OFFICE VISIT (OUTPATIENT)
Dept: FAMILY MEDICINE CLINIC | Facility: CLINIC | Age: 74
End: 2024-05-20
Payer: MEDICARE

## 2024-05-20 ENCOUNTER — APPOINTMENT (OUTPATIENT)
Dept: LAB | Facility: CLINIC | Age: 74
End: 2024-05-20
Payer: MEDICARE

## 2024-05-20 VITALS
TEMPERATURE: 96.7 F | WEIGHT: 169.8 LBS | DIASTOLIC BLOOD PRESSURE: 78 MMHG | BODY MASS INDEX: 31.25 KG/M2 | HEIGHT: 62 IN | OXYGEN SATURATION: 97 % | SYSTOLIC BLOOD PRESSURE: 130 MMHG | HEART RATE: 74 BPM

## 2024-05-20 DIAGNOSIS — I16.1 HYPERTENSIVE EMERGENCY: ICD-10-CM

## 2024-05-20 DIAGNOSIS — N17.9 AKI (ACUTE KIDNEY INJURY) (HCC): ICD-10-CM

## 2024-05-20 DIAGNOSIS — R79.89 ELEVATED TROPONIN LEVEL NOT DUE MYOCARDIAL INFARCTION: ICD-10-CM

## 2024-05-20 DIAGNOSIS — Z76.89 ENCOUNTER FOR SUPPORT AND COORDINATION OF TRANSITION OF CARE: Primary | ICD-10-CM

## 2024-05-20 DIAGNOSIS — M35.3 PMR (POLYMYALGIA RHEUMATICA) (HCC): ICD-10-CM

## 2024-05-20 PROBLEM — R03.0 WHITE COAT SYNDROME WITH HIGH BLOOD PRESSURE BUT WITHOUT HYPERTENSION: Status: RESOLVED | Noted: 2018-06-18 | Resolved: 2024-05-20

## 2024-05-20 LAB
ANION GAP SERPL CALCULATED.3IONS-SCNC: 7 MMOL/L (ref 4–13)
BASOPHILS # BLD AUTO: 0.12 THOUSANDS/ÂΜL (ref 0–0.1)
BASOPHILS NFR BLD AUTO: 1 % (ref 0–1)
BUN SERPL-MCNC: 45 MG/DL (ref 5–25)
CALCIUM SERPL-MCNC: 9.4 MG/DL (ref 8.4–10.2)
CHLORIDE SERPL-SCNC: 102 MMOL/L (ref 96–108)
CO2 SERPL-SCNC: 24 MMOL/L (ref 21–32)
CREAT SERPL-MCNC: 1.57 MG/DL (ref 0.6–1.3)
EOSINOPHIL # BLD AUTO: 0.23 THOUSAND/ÂΜL (ref 0–0.61)
EOSINOPHIL NFR BLD AUTO: 2 % (ref 0–6)
ERYTHROCYTE [DISTWIDTH] IN BLOOD BY AUTOMATED COUNT: 13.4 % (ref 11.6–15.1)
GFR SERPL CREATININE-BSD FRML MDRD: 32 ML/MIN/1.73SQ M
GLUCOSE P FAST SERPL-MCNC: 89 MG/DL (ref 65–99)
HCT VFR BLD AUTO: 48.6 % (ref 34.8–46.1)
HGB BLD-MCNC: 16 G/DL (ref 11.5–15.4)
IMM GRANULOCYTES # BLD AUTO: 0.06 THOUSAND/UL (ref 0–0.2)
IMM GRANULOCYTES NFR BLD AUTO: 1 % (ref 0–2)
LYMPHOCYTES # BLD AUTO: 2.67 THOUSANDS/ÂΜL (ref 0.6–4.47)
LYMPHOCYTES NFR BLD AUTO: 25 % (ref 14–44)
MCH RBC QN AUTO: 32.9 PG (ref 26.8–34.3)
MCHC RBC AUTO-ENTMCNC: 32.9 G/DL (ref 31.4–37.4)
MCV RBC AUTO: 100 FL (ref 82–98)
MONOCYTES # BLD AUTO: 0.93 THOUSAND/ÂΜL (ref 0.17–1.22)
MONOCYTES NFR BLD AUTO: 9 % (ref 4–12)
NEUTROPHILS # BLD AUTO: 6.51 THOUSANDS/ÂΜL (ref 1.85–7.62)
NEUTS SEG NFR BLD AUTO: 62 % (ref 43–75)
NRBC BLD AUTO-RTO: 0 /100 WBCS
PLATELET # BLD AUTO: 211 THOUSANDS/UL (ref 149–390)
PMV BLD AUTO: 13.9 FL (ref 8.9–12.7)
POTASSIUM SERPL-SCNC: 5.1 MMOL/L (ref 3.5–5.3)
RBC # BLD AUTO: 4.87 MILLION/UL (ref 3.81–5.12)
SODIUM SERPL-SCNC: 133 MMOL/L (ref 135–147)
WBC # BLD AUTO: 10.52 THOUSAND/UL (ref 4.31–10.16)

## 2024-05-20 PROCEDURE — 36415 COLL VENOUS BLD VENIPUNCTURE: CPT

## 2024-05-20 PROCEDURE — 80048 BASIC METABOLIC PNL TOTAL CA: CPT

## 2024-05-20 PROCEDURE — 99495 TRANSJ CARE MGMT MOD F2F 14D: CPT | Performed by: FAMILY MEDICINE

## 2024-05-20 PROCEDURE — 85025 COMPLETE CBC W/AUTO DIFF WBC: CPT

## 2024-05-20 NOTE — PROGRESS NOTES
"Transition of Care Visit  Name: Alyse Gray      : 1950      MRN: [id number]  Encounter Provider: Venkatesh Lundy MD  Encounter Date: 2024   Encounter department: [facility]    Assessment & Plan   1. Encounter for support and coordination of transition of care  2. Hypertensive emergency  -     CBC and differential; Future  -     Basic metabolic panel; Future  3. PEG (acute kidney injury) ([facility])  -     CBC and differential; Future  -     Basic metabolic panel; Future  4. Elevated troponin level not due myocardial infarction  5. PMR (polymyalgia rheumatica) ([facility])     Discussion\"  I reviewed all withpt.   - in regards to her hypertensive emergency with PEG, pt has improved. Labs improved by time of discharge and BP remains stable during today's visit. Will recheck CBC and BMP. Continue present care. F/u with Nephro. Recheck 3m  - in regards to her elevated troponins, this appears to be related to her hypertensive emergency and heart strain. Stress testing was negative. F/u with Cardio. Recheck 3m  - in re: to her PMR, bodyaches are stable. Monitor inflammatory markers. Avoid NSAIDs. Recheck 3m    Pt to f/u as above. Pt to call for problems or concerns in the interim  History of Present Illness     Transitional Care Management Review:   Alyse Gray is a 74 y.o. female here for TCM follow up.     During the TCM phone call patient stated:  TCM Call     Date and time call was made  2024  8:46 AM    Hospital care reviewed  Records reviewed    Patient was hospitialized at  [facility]    Date of Admission  24    Date of discharge  24    Diagnosis  Elevated troponin level not due myocardial infarction    Disposition  Home    Were the patients medications reviewed and updated  No    Current Symptoms  None      TCM Call     Post hospital issues  None    Should patient be enrolled in anticoag monitoring?  No    Scheduled for follow up?  Yes    Did " "you obtain your prescribed medications  Yes    Do you need help managing your prescriptions or medications  No    Is transportation to your appointment needed  No    I have advised the patient to call PCP with any new or worsening symptoms  Velia Villalta        the patient here for TCM visit  -74-year-old female was in normal state of health until 5/7 when she developed sharp left-sided chest pain while driving to work.  Patient did also have pain in her left shoulder and left arm.  While at work, coworkers noted that \"you do not look well\".  Somebody took her blood pressure and found it to be 227/116.  Patient was sent home.  She called this office and was advised to go to the ER for further evaluation.  In the ED, she was found to have elevated troponins as well as an elevated creatinine.  EKG showed right bundle branch block but no ischemic changes.  While in the ER, blood pressure was initially 192/92 however systolic did slowly decrease to 140.  Because of elevation in symptoms, patient was subsequently admitted.  Patient was evaluated by cardiology who felt that the elevated troponins were due to hypertensive emergency.  Nephrology evaluated patient and started on Coreg.  She had a nuclear stress test which was negative for ischemia.  By 5/8, blood pressure had improved and patient was feeling better and was able to be discharged home.  Patient here for transition of care visit.  - since discharge, pt states that she has been feeling well. She has been compliant with with meds. She denies CP, palpitations, lightheadedness or other CV symptoms with or without exertion  .- I reviewed available hospital records, lab results and study reports with pt      Review of Systems   Constitutional: Negative.    HENT: Negative.     Eyes: Negative.    Respiratory: Negative.     Cardiovascular: Negative.    Gastrointestinal: Negative.    Genitourinary: Negative.    Musculoskeletal:  Positive for arthralgias, back pain and " "myalgias. Negative for gait problem.   Skin: Negative.    Neurological: Negative.    Psychiatric/Behavioral: Negative.       Objective     /78 (BP Location: Left arm, Patient Position: Sitting, Cuff Size: Adult)   Pulse 74   Temp (!) 96.7 °F (35.9 °C)   Ht 5' 2.25\" (1.581 m)   Wt 77 kg (169 lb 12.8 oz)   SpO2 97%   BMI 30.81 kg/m²     Physical Exam  Vitals reviewed.   Constitutional:       Appearance: She is well-developed. She is not diaphoretic.   HENT:      Head: Normocephalic and atraumatic.      Right Ear: Tympanic membrane, ear canal and external ear normal.      Left Ear: Tympanic membrane, ear canal and external ear normal.      Nose: Nose normal.      Mouth/Throat:      Mouth: Mucous membranes are moist.      Pharynx: No oropharyngeal exudate.   Eyes:      Extraocular Movements: Extraocular movements intact.      Conjunctiva/sclera: Conjunctivae normal.      Pupils: Pupils are equal, round, and reactive to light.   Neck:      Thyroid: No thyromegaly.      Vascular: No carotid bruit or JVD.   Cardiovascular:      Rate and Rhythm: Normal rate and regular rhythm.      Heart sounds: Normal heart sounds. No murmur heard.     Comments: Absent L radial and antecubital pulses. Arm warm  Pulmonary:      Effort: Pulmonary effort is normal.      Breath sounds: Normal breath sounds.   Abdominal:      General: There is no distension.      Palpations: Abdomen is soft. There is no mass.      Tenderness: There is no abdominal tenderness.   Musculoskeletal:         General: Deformity (mild diffuse arthritic changes) present. No swelling or tenderness.      Cervical back: Normal range of motion and neck supple. No tenderness.      Right lower leg: Edema (trace) present.      Left lower leg: Edema (trace) present.   Lymphadenopathy:      Cervical: No cervical adenopathy.   Skin:     General: Skin is warm and dry.   Neurological:      General: No focal deficit present.      Mental Status: She is alert and oriented " to person, place, and time.      Cranial Nerves: No cranial nerve deficit.      Sensory: No sensory deficit.      Motor: No weakness or abnormal muscle tone.      Gait: Gait normal.      Deep Tendon Reflexes: Reflexes are normal and symmetric.   Psychiatric:         Mood and Affect: Mood normal.         Behavior: Behavior normal.         Thought Content: Thought content normal.         Judgment: Judgment normal.       Medications have been reviewed by provider in current encounter    Administrative Statements

## 2024-05-23 ENCOUNTER — TELEPHONE (OUTPATIENT)
Dept: FAMILY MEDICINE CLINIC | Facility: CLINIC | Age: 74
End: 2024-05-23

## 2024-05-23 DIAGNOSIS — N17.9 AKI (ACUTE KIDNEY INJURY) (HCC): Primary | ICD-10-CM

## 2024-06-05 ENCOUNTER — CLINICAL SUPPORT (OUTPATIENT)
Dept: FAMILY MEDICINE CLINIC | Facility: CLINIC | Age: 74
End: 2024-06-05
Payer: MEDICARE

## 2024-06-05 ENCOUNTER — TELEPHONE (OUTPATIENT)
Dept: FAMILY MEDICINE CLINIC | Facility: CLINIC | Age: 74
End: 2024-06-05

## 2024-06-05 VITALS — HEART RATE: 70 BPM | OXYGEN SATURATION: 96 % | SYSTOLIC BLOOD PRESSURE: 128 MMHG | DIASTOLIC BLOOD PRESSURE: 70 MMHG

## 2024-06-05 DIAGNOSIS — I16.1 HYPERTENSIVE EMERGENCY: Primary | ICD-10-CM

## 2024-06-05 PROCEDURE — 99211 OFF/OP EST MAY X REQ PHY/QHP: CPT

## 2024-06-05 NOTE — TELEPHONE ENCOUNTER
Patient came into the office for a two week blood pressure check. First reading was 128/70. Patient also brought in home wrist cuff, Her reading was 153/79. States she get headaches after taking Coreg but also has chronic migraines.    Also patient states Creatinine levels elevated from labs 5/20, would like to know why that happens.     Please advise.

## 2024-06-05 NOTE — PROGRESS NOTES
Patient came into the office for a two week blood pressure check. First reading was 128/70. Patient also brought in home wrist cuff, Her reading was 153/79.  States she get headaches after taking Coreg but also has chronic migraines.    Spoke with Dr. Harris, continue present care. Take 15 points off the top number if patient will continue using home blood pressure cuff. 3 month follow up.

## 2024-06-10 ENCOUNTER — APPOINTMENT (OUTPATIENT)
Dept: LAB | Facility: CLINIC | Age: 74
End: 2024-06-10
Payer: MEDICARE

## 2024-06-10 DIAGNOSIS — N17.9 AKI (ACUTE KIDNEY INJURY) (HCC): ICD-10-CM

## 2024-06-10 LAB
ANION GAP SERPL CALCULATED.3IONS-SCNC: 12 MMOL/L (ref 4–13)
BUN SERPL-MCNC: 20 MG/DL (ref 5–25)
CALCIUM SERPL-MCNC: 9.4 MG/DL (ref 8.4–10.2)
CHLORIDE SERPL-SCNC: 102 MMOL/L (ref 96–108)
CO2 SERPL-SCNC: 24 MMOL/L (ref 21–32)
CREAT SERPL-MCNC: 1.2 MG/DL (ref 0.6–1.3)
GFR SERPL CREATININE-BSD FRML MDRD: 44 ML/MIN/1.73SQ M
GLUCOSE P FAST SERPL-MCNC: 81 MG/DL (ref 65–99)
POTASSIUM SERPL-SCNC: 4.3 MMOL/L (ref 3.5–5.3)
SODIUM SERPL-SCNC: 138 MMOL/L (ref 135–147)

## 2024-06-10 PROCEDURE — 80048 BASIC METABOLIC PNL TOTAL CA: CPT

## 2024-06-10 PROCEDURE — 36415 COLL VENOUS BLD VENIPUNCTURE: CPT

## 2024-09-04 ENCOUNTER — RA CDI HCC (OUTPATIENT)
Dept: OTHER | Facility: HOSPITAL | Age: 74
End: 2024-09-04

## 2024-09-19 ENCOUNTER — APPOINTMENT (OUTPATIENT)
Dept: LAB | Facility: CLINIC | Age: 74
End: 2024-09-19
Payer: MEDICARE

## 2024-09-19 ENCOUNTER — OFFICE VISIT (OUTPATIENT)
Dept: FAMILY MEDICINE CLINIC | Facility: CLINIC | Age: 74
End: 2024-09-19

## 2024-09-19 VITALS
HEART RATE: 76 BPM | OXYGEN SATURATION: 99 % | DIASTOLIC BLOOD PRESSURE: 82 MMHG | BODY MASS INDEX: 30.22 KG/M2 | HEIGHT: 62 IN | TEMPERATURE: 97.3 F | SYSTOLIC BLOOD PRESSURE: 140 MMHG | WEIGHT: 164.2 LBS

## 2024-09-19 DIAGNOSIS — R35.0 URINARY FREQUENCY: ICD-10-CM

## 2024-09-19 DIAGNOSIS — E03.9 PRIMARY HYPOTHYROIDISM: Chronic | ICD-10-CM

## 2024-09-19 DIAGNOSIS — L30.9 DERMATITIS: ICD-10-CM

## 2024-09-19 DIAGNOSIS — I10 PRIMARY HYPERTENSION: ICD-10-CM

## 2024-09-19 DIAGNOSIS — Z00.00 MEDICARE ANNUAL WELLNESS VISIT, SUBSEQUENT: Primary | ICD-10-CM

## 2024-09-19 LAB
ALBUMIN SERPL BCG-MCNC: 4.3 G/DL (ref 3.5–5)
ALP SERPL-CCNC: 60 U/L (ref 34–104)
ALT SERPL W P-5'-P-CCNC: 17 U/L (ref 7–52)
ANION GAP SERPL CALCULATED.3IONS-SCNC: 9 MMOL/L (ref 4–13)
AST SERPL W P-5'-P-CCNC: 24 U/L (ref 5–45)
BASOPHILS # BLD AUTO: 0.14 THOUSANDS/ΜL (ref 0–0.1)
BASOPHILS NFR BLD AUTO: 2 % (ref 0–1)
BILIRUB SERPL-MCNC: 0.58 MG/DL (ref 0.2–1)
BUN SERPL-MCNC: 24 MG/DL (ref 5–25)
CALCIUM SERPL-MCNC: 9.1 MG/DL (ref 8.4–10.2)
CHLORIDE SERPL-SCNC: 103 MMOL/L (ref 96–108)
CHOLEST SERPL-MCNC: 197 MG/DL
CO2 SERPL-SCNC: 24 MMOL/L (ref 21–32)
CREAT SERPL-MCNC: 1.23 MG/DL (ref 0.6–1.3)
EOSINOPHIL # BLD AUTO: 0.28 THOUSAND/ΜL (ref 0–0.61)
EOSINOPHIL NFR BLD AUTO: 3 % (ref 0–6)
ERYTHROCYTE [DISTWIDTH] IN BLOOD BY AUTOMATED COUNT: 13.5 % (ref 11.6–15.1)
GLUCOSE P FAST SERPL-MCNC: 86 MG/DL (ref 65–99)
HCT VFR BLD AUTO: 47.6 % (ref 36.5–46.1)
HDLC SERPL-MCNC: 45 MG/DL
HGB BLD-MCNC: 15.4 G/DL (ref 12–15.4)
IMM GRANULOCYTES # BLD AUTO: 0.06 THOUSAND/UL (ref 0–0.2)
IMM GRANULOCYTES NFR BLD AUTO: 1 % (ref 0–2)
LDLC SERPL CALC-MCNC: 124 MG/DL (ref 0–100)
LYMPHOCYTES # BLD AUTO: 2.63 THOUSANDS/ΜL (ref 0.6–4.47)
LYMPHOCYTES NFR BLD AUTO: 28 % (ref 14–44)
MCH RBC QN AUTO: 32.4 PG (ref 26.8–34.3)
MCHC RBC AUTO-ENTMCNC: 32.4 G/DL (ref 31.4–37.4)
MCV RBC AUTO: 100 FL (ref 82–98)
MONOCYTES # BLD AUTO: 1.05 THOUSAND/ΜL (ref 0.17–1.22)
MONOCYTES NFR BLD AUTO: 11 % (ref 4–12)
NEUTROPHILS # BLD AUTO: 5.33 THOUSANDS/ΜL (ref 1.85–7.62)
NEUTS SEG NFR BLD AUTO: 55 % (ref 43–75)
NONHDLC SERPL-MCNC: 152 MG/DL
NRBC BLD AUTO-RTO: 0 /100 WBCS
PLATELET # BLD AUTO: 197 THOUSANDS/UL (ref 149–390)
PMV BLD AUTO: 13.4 FL (ref 8.9–12.7)
POTASSIUM SERPL-SCNC: 4.6 MMOL/L (ref 3.5–5.3)
PROT SERPL-MCNC: 7.5 G/DL (ref 6.4–8.4)
RBC # BLD AUTO: 4.76 MILLION/UL (ref 3.88–5.12)
SODIUM SERPL-SCNC: 136 MMOL/L (ref 135–147)
TRIGL SERPL-MCNC: 139 MG/DL
TSH SERPL DL<=0.05 MIU/L-ACNC: 3.43 UIU/ML (ref 0.45–4.5)
WBC # BLD AUTO: 9.49 THOUSAND/UL (ref 4.31–10.16)

## 2024-09-19 PROCEDURE — 80053 COMPREHEN METABOLIC PANEL: CPT

## 2024-09-19 PROCEDURE — 36415 COLL VENOUS BLD VENIPUNCTURE: CPT

## 2024-09-19 PROCEDURE — 84443 ASSAY THYROID STIM HORMONE: CPT

## 2024-09-19 PROCEDURE — 85025 COMPLETE CBC W/AUTO DIFF WBC: CPT

## 2024-09-19 PROCEDURE — 80061 LIPID PANEL: CPT

## 2024-09-19 RX ORDER — TRIAMCINOLONE ACETONIDE 1 MG/ML
LOTION TOPICAL 3 TIMES DAILY
Qty: 60 ML | Refills: 2 | Status: SHIPPED | OUTPATIENT
Start: 2024-09-19

## 2024-09-19 NOTE — PROGRESS NOTES
Ambulatory Visit  Name: Alyse Gray      : 1950      MRN: [id number]  Encounter Provider: Venkatesh Lundy MD  Encounter Date: 2024   Encounter department: [facility]  Medicare annual wellness visit, subsequent         Primary hypothyroidism  Appears to be stable clinically. Check TSH. Adjust meds if TSH is not at goal. Recheck 6m      Orders:    TSH, 3rd generation with Free T4 reflex; Future    Primary hypertension    Orders:    CBC and differential; Future    Comprehensive metabolic panel; Future    Lipid panel; Future    Dermatitis  ? Contact?  I reviewed with pt. Check for new detergents or similar. Start triamcinolone lotion as directed.  Recheck 2w if not improving - earlier if worse    Orders:    triamcinolone (KENALOG) 0.1 % lotion; Apply topically 3 (three) times a day    Urinary frequency  Without dysuria.  Refuses treatment. Continue to monitor. Recheck 6m            Preventive health issues were discussed with patient, and age appropriate screening tests were ordered as noted in patient's After Visit Summary. Personalized health advice and appropriate referrals for health education or preventive services given if needed, as noted in patient's After Visit Summary.    History of Present Illness     f/u multiple med issues and AWV  - pt feels well. She is buying a [facility] home.   - pt with 2-3w hx of pruritic trunk rash. No improvement with conservative care.   - pt still smoking 10-12cig/day. Pt states breathing is stable.   - pt states that home Bps have been 130-150/80-90s.  Pt denies CP, palpitations, lightheadedness or other CV symptoms with or without exertion  - pt denies any new GI issues.     - pt has increased urinary frequency and nocturia.  Does not want medications  - still refuses to have mammo and dexa.  - AWV             Patient Care Team:  Venkatesh Lundy MD as PCP - General    Review of Systems    Constitutional: Negative.    HENT: Negative.     Eyes: Negative.    Respiratory: Negative.     Cardiovascular: Negative.    Gastrointestinal: Negative.    Genitourinary:  Positive for frequency. Negative for difficulty urinating.   Musculoskeletal:  Positive for arthralgias, back pain and myalgias. Negative for gait problem.   Skin:  Positive for rash.   Neurological: Negative.    Psychiatric/Behavioral: Negative.       Medical History Reviewed by provider this encounter:       Annual Wellness Visit Questionnaire   Alyse is here for her Subsequent Wellness visit. Last Medicare Wellness visit information reviewed, patient interviewed and updates made to the record today.      Health Risk Assessment:   Patient rates overall health as good. Patient feels that their physical health rating is slightly better. Patient is very satisfied with their life. Eyesight was rated as same. Hearing was rated as same. Patient feels that their emotional and mental health rating is slightly better. Patients states they are sometimes angry. Patient states they are never, rarely unusually tired/fatigued. Pain experienced in the last 7 days has been none. Patient states that she has experienced no weight loss or gain in last 6 months.     Depression Screening:   PHQ-2 Score: 0      Fall Risk Screening:   In the past year, patient has experienced: no history of falling in past year      Urinary Incontinence Screening:   Patient has leaked urine accidently in the last six months. Has frequency and some leakage. Refuses treatment    Home Safety:  Patient does not have trouble with stairs inside or outside of their home. Patient has working smoke alarms and has working carbon monoxide detector. Home safety hazards include: none.     Nutrition:   Current diet is Regular.     Medications:   Patient is currently taking over-the-counter supplements. OTC medications include: see medication list. Patient is able to manage medications.      Activities of Daily Living (ADLs)/Instrumental Activities of Daily Living (IADLs):   Walk and transfer into and out of bed and chair?: Yes  Dress and groom yourself?: Yes    Bathe or shower yourself?: Yes    Feed yourself? Yes  Do your laundry/housekeeping?: Yes  Manage your money, pay your bills and track your expenses?: Yes  Make your own meals?: Yes    Do your own shopping?: Yes    Previous Hospitalizations:   Any hospitalizations or ED visits within the last 12 months?: No      Advance Care Planning:   Living will: No    Durable POA for healthcare: No    Advanced directive: No    Advanced directive counseling given: Yes    ACP document given: Yes      Cognitive Screening:   Provider or family/friend/caregiver concerned regarding cognition?: No    PREVENTIVE SCREENINGS      Cardiovascular Screening:    General: Screening Current      Diabetes Screening:     General: Screening Current      Colorectal Cancer Screening:     General: Screening Current      Breast Cancer Screening:     General: Patient Declines      Cervical Cancer Screening:    General: Screening Not Indicated      Osteoporosis Screening:    General: Screening Current      Abdominal Aortic Aneurysm (AAA) Screening:        General: Screening Not Indicated      Lung Cancer Screening:     General: Patient Declines      Hepatitis C Screening:    General: Screening Current    Screening, Brief Intervention, and Referral to Treatment (SBIRT)    Screening    Typical number of drinks in a week: 0    AUDIT-C Screenin) How often did you have a drink containing alcohol in the past year? never  2) How many drinks did you have on a typical day when you were drinking in the past year? 0  3) How often did you have 6 or more drinks on one occasion in the past year? never    AUDIT-C Score: 0  Interpretation: Score 0-2 (female): Negative screen for alcohol misuse    Single Item Drug Screening:  How often have you used an illegal drug (including marijuana) or a  "prescription medication for non-medical reasons in the past year? never    Single Item Drug Screen Score: 0  Interpretation: Negative screen for possible drug use disorder    Brief Intervention  Community resources provided.     Time Spent  Time spent screening/evaluating the patient for alcohol misuse: 5 minutes.     Annual Depression Screening  Time spent screening and evaluating the patient for depression during today's encounter was 5 minutes.    Other Counseling Topics:   Calcium and vitamin D intake and regular weightbearing exercise.     Social Determinants of Health     Financial Resource Strain: Low Risk  (6/9/2023)    Overall Financial Resource Strain (CARDIA)     Difficulty of Paying Living Expenses: Not hard at all   Food Insecurity: No Food Insecurity (9/19/2024)    Hunger Vital Sign     Worried About Running Out of Food in the Last Year: Never true     Ran Out of Food in the Last Year: Never true   Transportation Needs: No Transportation Needs (9/19/2024)    PRAPARE - Transportation     Lack of Transportation (Medical): No     Lack of Transportation (Non-Medical): No   Housing Stability: Low Risk  (9/19/2024)    Housing Stability Vital Sign     Unable to Pay for Housing in the Last Year: No     Number of Times Moved in the Last Year: 1     Homeless in the Last Year: No   Utilities: Not At Risk (9/19/2024)    [facility] Utilities     Threatened with loss of utilities: No     No results found.    Objective     /82   Pulse 76   Temp (!) 97.3 °F (36.3 °C)   Ht 5' 2.25\" (1.581 m)   Wt 74.5 kg (164 lb 3.2 oz)   SpO2 99%   BMI 29.79 kg/m²     Physical Exam  Vitals reviewed.   Constitutional:       Appearance: She is well-developed. She is not diaphoretic.   HENT:      Head: Normocephalic and atraumatic.      Right Ear: Tympanic membrane, ear canal and external ear normal.      Left Ear: Tympanic membrane, ear canal and external ear normal.      Nose: Nose normal.      Mouth/Throat:      Mouth: Mucous " membranes are moist.      Pharynx: No oropharyngeal exudate.   Eyes:      Extraocular Movements: Extraocular movements intact.      Conjunctiva/sclera: Conjunctivae normal.      Pupils: Pupils are equal, round, and reactive to light.   Neck:      Thyroid: No thyromegaly.      Vascular: No carotid bruit or JVD.   Cardiovascular:      Rate and Rhythm: Normal rate and regular rhythm.      Heart sounds: Normal heart sounds. No murmur heard.  Pulmonary:      Effort: Pulmonary effort is normal.      Comments: Sl decreased breath sounds throughout bilat  Abdominal:      General: There is no distension.      Palpations: Abdomen is soft. There is no mass.      Tenderness: There is no abdominal tenderness.   Musculoskeletal:         General: Deformity (arthritic changes in hands. Sl increased thoracic kyphosis) present. No swelling or tenderness.      Cervical back: Normal range of motion and neck supple. No tenderness.      Right lower leg: Edema (trace) present.      Left lower leg: Edema (trace) present.   Lymphadenopathy:      Cervical: No cervical adenopathy.   Skin:     General: Skin is warm and dry.      Findings: Rash: scattered small papules in underwear distribution. No tracking, pustules or other changes.   Neurological:      General: No focal deficit present.      Mental Status: She is alert and oriented to person, place, and time.      Cranial Nerves: No cranial nerve deficit.      Sensory: No sensory deficit.      Motor: No weakness or abnormal muscle tone.      Gait: Gait normal.      Deep Tendon Reflexes: Reflexes are normal and symmetric.   Psychiatric:         Mood and Affect: Mood normal.         Behavior: Behavior normal.         Thought Content: Thought content normal.         Judgment: Judgment normal.      Comments: PHQ-2/9 Depression Screening    Little interest or pleasure in doing things: 0 - not at all  Feeling down, depressed, or hopeless: 0 - not at all  PHQ-2 Score: 0  PHQ-2 Interpretation:  Negative depression screen

## 2024-09-22 PROBLEM — L30.9 DERMATITIS: Status: ACTIVE | Noted: 2024-09-22

## 2024-09-22 PROBLEM — R79.89 ELEVATED TROPONIN LEVEL NOT DUE MYOCARDIAL INFARCTION: Status: RESOLVED | Noted: 2024-05-07 | Resolved: 2024-09-22

## 2024-09-22 PROBLEM — I10 ESSENTIAL HYPERTENSION: Status: ACTIVE | Noted: 2024-05-07

## 2024-09-22 PROBLEM — N17.9 AKI (ACUTE KIDNEY INJURY) (HCC): Status: RESOLVED | Noted: 2024-05-07 | Resolved: 2024-09-22

## 2024-09-22 NOTE — ASSESSMENT & PLAN NOTE
No longer on Carvedilol and does not want to restart. I discussed risks. Pt fully understands. Recheck 6m. Monitor BP in the interim

## 2024-09-22 NOTE — ASSESSMENT & PLAN NOTE
? Contact?  I reviewed with pt. Check for new detergents or similar. Start triamcinolone lotion as directed.  Recheck 2w if not improving - earlier if worse    Orders:    triamcinolone (KENALOG) 0.1 % lotion; Apply topically 3 (three) times a day

## 2024-09-22 NOTE — ASSESSMENT & PLAN NOTE
Appears to be stable clinically. Check TSH. Adjust meds if TSH is not at goal. Recheck 6m      Orders:    TSH, 3rd generation with Free T4 reflex; Future

## 2024-10-03 DIAGNOSIS — E03.9 HYPOTHYROIDISM, UNSPECIFIED TYPE: ICD-10-CM

## 2024-10-03 RX ORDER — LEVOTHYROXINE SODIUM 112 UG/1
112 TABLET ORAL DAILY
Qty: 90 TABLET | Refills: 1 | Status: SHIPPED | OUTPATIENT
Start: 2024-10-03

## 2024-11-25 ENCOUNTER — OFFICE VISIT (OUTPATIENT)
Dept: URGENT CARE | Age: 74
End: 2024-11-25
Payer: MEDICARE

## 2024-11-25 ENCOUNTER — TELEPHONE (OUTPATIENT)
Dept: FAMILY MEDICINE CLINIC | Facility: CLINIC | Age: 74
End: 2024-11-25

## 2024-11-25 VITALS
RESPIRATION RATE: 18 BRPM | OXYGEN SATURATION: 97 % | TEMPERATURE: 97.6 F | DIASTOLIC BLOOD PRESSURE: 84 MMHG | HEART RATE: 94 BPM | SYSTOLIC BLOOD PRESSURE: 138 MMHG

## 2024-11-25 DIAGNOSIS — R21 RASH: Primary | ICD-10-CM

## 2024-11-25 PROCEDURE — G0463 HOSPITAL OUTPT CLINIC VISIT: HCPCS | Performed by: STUDENT IN AN ORGANIZED HEALTH CARE EDUCATION/TRAINING PROGRAM

## 2024-11-25 PROCEDURE — 99213 OFFICE O/P EST LOW 20 MIN: CPT | Performed by: STUDENT IN AN ORGANIZED HEALTH CARE EDUCATION/TRAINING PROGRAM

## 2024-11-25 RX ORDER — HYDROXYZINE HYDROCHLORIDE 25 MG/1
25 TABLET, FILM COATED ORAL EVERY 8 HOURS PRN
Qty: 30 TABLET | Refills: 0 | Status: SHIPPED | OUTPATIENT
Start: 2024-11-25 | End: 2024-12-02

## 2024-11-25 RX ORDER — PERMETHRIN 50 MG/G
CREAM TOPICAL ONCE
Qty: 60 G | Refills: 0 | Status: SHIPPED | OUTPATIENT
Start: 2024-11-25 | End: 2024-11-25

## 2024-11-25 NOTE — TELEPHONE ENCOUNTER
Pt called in about ongoing rash from 2 months ago, she states it never went away and now it is spreading everywhere. Her back, butt, lower legs, upper legs, stomach, chest, arms.

## 2024-11-25 NOTE — PROGRESS NOTES
[facility]        NAME: Alyse Gray is a 74 y.o. adult  : 1950    MRN: [id number]  DATE: 2024  TIME: 7:11 PM    Assessment and Plan   Rash [R21]  1. Rash  permethrin (ELIMITE) 5 % cream    hydrOXYzine HCL (ATARAX) 25 mg tablet      Rash is concerning for scabies.  Will treat suspected scabies with permethrin cream.  She already has a follow-up in 1 week with her PCP for recheck.  Atarax for itching, reviewed side effects.      Patient Instructions   Apply cream to all areas of the body, avoid eyes, nose, mouth.  Leave on for 8 to 14 hours before rinsing off.  Many people find that it is helpful to apply it all over the body before bed and then to wash off in the morning.  Usually 1 treatment will cure scabies.  Please keep your upcoming appointment with your PCP for recheck.  I am also prescribing a medication to help with itching.  Do not take Benadryl while you are taking this as they are send medications.  Take it before bed, not before driving or operating heavy machinery as it can cause you to feel tired or sedated.    Follow up with PCP in 3-5 days.  Proceed to  ER if symptoms worsen.    If tests have been performed at [facility], our office will contact you with results if changes need to be made to the care plan discussed with you at the visit.  You can review your full results on [facility].    Chief Complaint     Chief Complaint   Patient presents with    Rash     Rash started in September. PCP said that it was from her laundry detergent. Rash is getting worse and is spreading around at work.          History of Present Illness       Patient presents for evaluation of rash has been going on for about 2 months.  Initially started on her abdomen with an itchy rash.  Since then has spread all over.  Initially she was seeing her PCP for an annual visit and mention the rash, suspected to possibly be a contact dermatitis.  She tried steroid creams which provide temporary  relief to the itching but then symptoms come back and the rash has only been spreading since then.  She is having trouble sleeping due to the itching.  She is here with her son who does not have any the same symptoms.  However she notes that she has some coworkers/clients who have a similar rash.      Rash        Review of Systems   Review of Systems   Skin:  Positive for rash.   All other systems reviewed and are negative.        Current Medications       Current Outpatient Medications:     cholecalciferol (VITAMIN D3) 1,000 units tablet, Take 1 tablet (1,000 Units total) by mouth daily, Disp: 90 tablet, Rfl: 3    hydrOXYzine HCL (ATARAX) 25 mg tablet, Take 1 tablet (25 mg total) by mouth every 8 (eight) hours as needed for itching, Disp: 30 tablet, Rfl: 0    levothyroxine 112 mcg tablet, Take 1 tablet by mouth once daily, Disp: 90 tablet, Rfl: 1    permethrin (ELIMITE) 5 % cream, Apply topically once for 1 dose, Disp: 60 g, Rfl: 0    triamcinolone (KENALOG) 0.1 % lotion, Apply topically 3 (three) times a day, Disp: 60 mL, Rfl: 2    carvedilol (COREG) 6.25 mg tablet, Take 1 tablet (6.25 mg total) by mouth 2 (two) times a day with meals (Patient not taking: Reported on 9/19/2024), Disp: 60 tablet, Rfl: 0    Current Allergies     Allergies as of 11/25/2024 - Reviewed 11/25/2024   Allergen Reaction Noted    Bee venom  01/28/2014    Naproxen Hives 01/28/2014    Penicillins Hives 08/29/2017    Vicodin  [hydrocodone-acetaminophen] Hives and Itching 01/28/2014    Other Rash 06/09/2023            The following portions of the patient's history were reviewed and updated as appropriate: allergies, current medications, past family history, past medical history, past social history, past surgical history and problem list.     Past Medical History:   Diagnosis Date    Disease of thyroid gland     Polymyalgia rheumatica (HCC)        Past Surgical History:   Procedure Laterality Date    CHOLECYSTECTOMY      FOOT SURGERY       GALLBLADDER SURGERY      SHOULDER ARTHROSCOPY Right        Family History   Problem Relation Age of Onset    Heart disease Mother     Diabetes Mother     Hypertension Mother     Breast cancer Sister     Colon cancer Brother     Heart disease Brother     Diabetes Brother     Hypertension Brother          Medications have been verified.        Objective   /84   Pulse 94   Temp 97.6 °F (36.4 °C)   Resp 18   SpO2 97%   No LMP recorded. Patient is postmenopausal.       Physical Exam     Physical Exam  Vitals and nursing note reviewed.   Constitutional:       Appearance: She is not ill-appearing, toxic-appearing or diaphoretic.   HENT:      Head: Normocephalic and atraumatic.      Right Ear: External ear normal.      Left Ear: External ear normal.      Nose: Nose normal.   Eyes:      Extraocular Movements: Extraocular movements intact.      Conjunctiva/sclera: Conjunctivae normal.   Skin:     General: Skin is warm and dry.      Findings: Rash present.      Comments: Excoriated rash, involves into webspaces, see pictures below   Neurological:      Mental Status: She is alert.   Psychiatric:         Mood and Affect: Mood normal.        Media Information      Document Information    Clinical Image - Mobile Device   L fingers rash   11/25/2024 19:04   Attached To:   Alyse Hallut   Source Information    Fariha Epps, DO  [facility]   Document History         Media Information      Document Information    Clinical Image - Mobile Device   R leg rash   11/25/2024 19:04   Attached To:   Alyse Gray   Source Information    Fariha Epps, DO  [facility]   Document History

## 2024-11-26 NOTE — PATIENT INSTRUCTIONS
Apply cream to all areas of the body, avoid eyes, nose, mouth.  Leave on for 8 to 14 hours before rinsing off.  Many people find that it is helpful to apply it all over the body before bed and then to wash off in the morning.  Usually 1 treatment will cure scabies.  Please keep your upcoming appointment with your PCP for recheck.  I am also prescribing a medication to help with itching.  Do not take Benadryl while you are taking this as they are send medications.  Take it before bed, not before driving or operating heavy machinery as it can cause you to feel tired or sedated.

## 2024-12-02 ENCOUNTER — OFFICE VISIT (OUTPATIENT)
Dept: FAMILY MEDICINE CLINIC | Facility: CLINIC | Age: 74
End: 2024-12-02
Payer: MEDICARE

## 2024-12-02 VITALS
TEMPERATURE: 97.2 F | BODY MASS INDEX: 30.25 KG/M2 | DIASTOLIC BLOOD PRESSURE: 80 MMHG | WEIGHT: 164.4 LBS | SYSTOLIC BLOOD PRESSURE: 130 MMHG | OXYGEN SATURATION: 97 % | HEIGHT: 62 IN | HEART RATE: 96 BPM

## 2024-12-02 DIAGNOSIS — L30.8 PRURITIC DERMATITIS: Primary | ICD-10-CM

## 2024-12-02 PROCEDURE — 99213 OFFICE O/P EST LOW 20 MIN: CPT | Performed by: FAMILY MEDICINE

## 2024-12-02 PROCEDURE — G2211 COMPLEX E/M VISIT ADD ON: HCPCS | Performed by: FAMILY MEDICINE

## 2024-12-02 RX ORDER — PREDNISONE 20 MG/1
TABLET ORAL
Qty: 12 TABLET | Refills: 0 | Status: SHIPPED | OUTPATIENT
Start: 2024-12-02

## 2024-12-02 NOTE — PROGRESS NOTES
Name: Alyse Gray      : 1950      MRN: [id number]  Encounter Provider: Venkatesh Lundy MD  Encounter Date: 2024   Encounter department: [facility]    Assessment & Plan  Pruritic dermatitis  Agree with urgent care diagnosis-appears to be scabies.  Treatment was appropriate.  Explained to patient that he can pick a couple weeks after treatment for symptoms to ayden.  Several coworkers and patients at her work have experienced similar rashes and treated for scabies.  REC: trial or pred as directed. Continue hydroxyzine. Refer to derm if not improving in 1w. Pt to call for problems or concerns  Orders:    Ambulatory referral to Dermatology; Future    predniSONE 20 mg tablet; 3 tab po qd x 2 then 2 tab po qd x 2 then 1 tab po qd x 2         History of Present Illness     Persistent rash since early Sept. No change with change in detergents.   initially rash was on the abdomen but now is all 4 extremities, back, chest (.  Was seen at [facility] 1w ago and diagnosed with scabies.  Patient was started on permethrin and hydroxyzine.  She described using permethrin appropriately but has not had any improvement. Pt here for recheck        Review of Systems   Constitutional: Negative.    Skin:  Positive for rash and wound (from scratching).   Neurological: Negative.      Past Medical History:   Diagnosis Date    Disease of thyroid gland     Polymyalgia rheumatica (HCC)      Past Surgical History:   Procedure Laterality Date    CHOLECYSTECTOMY      FOOT SURGERY      GALLBLADDER SURGERY      SHOULDER ARTHROSCOPY Right      Family History   Problem Relation Age of Onset    Heart disease Mother     Diabetes Mother     Hypertension Mother     Breast cancer Sister     Colon cancer Brother     Heart disease Brother     Diabetes Brother     Hypertension Brother      Social History     Tobacco Use    Smoking status: Every Day     Current packs/day: 0.50     Average packs/day: 0.5  "packs/day for 53.0 years (26.5 ttl pk-yrs)     Types: Cigarettes     Passive exposure: Current    Smokeless tobacco: Never   Vaping Use    Vaping status: Never Used   Substance and Sexual Activity    Alcohol use: No    Drug use: No    Sexual activity: Not Currently     Current Outpatient Medications on File Prior to Visit   Medication Sig    cholecalciferol (VITAMIN D3) 1,000 units tablet Take 1 tablet (1,000 Units total) by mouth daily    levothyroxine 112 mcg tablet Take 1 tablet by mouth once daily    [DISCONTINUED] carvedilol (COREG) 6.25 mg tablet Take 1 tablet (6.25 mg total) by mouth 2 (two) times a day with meals (Patient not taking: Reported on 9/19/2024)    [DISCONTINUED] hydrOXYzine HCL (ATARAX) 25 mg tablet Take 1 tablet (25 mg total) by mouth every 8 (eight) hours as needed for itching (Patient not taking: Reported on 12/2/2024)    [DISCONTINUED] triamcinolone (KENALOG) 0.1 % lotion Apply topically 3 (three) times a day (Patient not taking: Reported on 12/2/2024)     Allergies   Allergen Reactions    Bee Venom     Naproxen Hives    Penicillins Hives    Vicodin  [Hydrocodone-Acetaminophen] Hives and Itching    Other Rash     Spider bite      Immunization History   Administered Date(s) Administered    COVID-19 MODERNA VACC 0.5 ML IM 01/22/2021, 02/18/2021    COVID-19 Moderna mRNA Vaccine 12 Yr+ 50 mcg/0.5 mL (Spikevax) 10/18/2023    COVID-19 Novavax 2023 vaccine IM 09/23/2024    INFLUENZA 10/16/2017, 10/31/2018, 08/17/2020, 10/14/2021, 11/13/2022, 10/18/2023    Influenza Quadrivalent, 6-35 Months IM 10/01/2014    Influenza Split High Dose Preservative Free IM 09/23/2024    Influenza, high dose seasonal 0.7 mL 08/17/2020    Influenza, seasonal, injectable 01/28/2014    Pneumococcal Conjugate 13-Valent 10/16/2017    Pneumococcal Polysaccharide PPV23 10/01/2014, 10/31/2018     Objective   /80   Pulse 96   Temp (!) 97.2 °F (36.2 °C)   Ht 5' 2.25\" (1.581 m)   Wt 74.6 kg (164 lb 6.4 oz)   SpO2 97%   " BMI 29.83 kg/m²     Physical Exam  Vitals reviewed.   Skin:     Capillary Refill: Capillary refill takes less than 2 seconds.      Findings: Rash present.      Comments: Scattered erythematous macular lesions with some?  Tracking multiple excoriations over all body surfaces from her neck down.  No cellulitis appreciated.   Neurological:      Mental Status: She is alert.

## 2025-01-22 ENCOUNTER — APPOINTMENT (EMERGENCY)
Dept: CT IMAGING | Facility: HOSPITAL | Age: 75
End: 2025-01-22
Payer: MEDICARE

## 2025-01-22 ENCOUNTER — HOSPITAL ENCOUNTER (OUTPATIENT)
Facility: HOSPITAL | Age: 75
Setting detail: OBSERVATION
Discharge: HOME | End: 2025-01-23
Attending: EMERGENCY MEDICINE | Admitting: INTERNAL MEDICINE
Payer: MEDICARE

## 2025-01-22 ENCOUNTER — APPOINTMENT (EMERGENCY)
Dept: RADIOLOGY | Facility: HOSPITAL | Age: 75
End: 2025-01-22
Payer: MEDICARE

## 2025-01-22 DIAGNOSIS — R79.89 ELEVATED LFTS: ICD-10-CM

## 2025-01-22 DIAGNOSIS — R55 NEAR SYNCOPE: ICD-10-CM

## 2025-01-22 DIAGNOSIS — K52.9 COLITIS: ICD-10-CM

## 2025-01-22 DIAGNOSIS — R19.7 DIARRHEA: ICD-10-CM

## 2025-01-22 DIAGNOSIS — I70.1 RENAL ARTERY STENOSIS (HCC): Primary | ICD-10-CM

## 2025-01-22 DIAGNOSIS — R03.0 ELEVATED BLOOD PRESSURE READING: ICD-10-CM

## 2025-01-22 DIAGNOSIS — R10.9 ACUTE ABDOMINAL PAIN: ICD-10-CM

## 2025-01-22 DIAGNOSIS — R94.31 LONG QT INTERVAL: ICD-10-CM

## 2025-01-22 DIAGNOSIS — R79.89 ELEVATED SERUM CREATININE: ICD-10-CM

## 2025-01-22 PROBLEM — I50.30 DIASTOLIC CHF (HCC): Status: ACTIVE | Noted: 2025-01-22

## 2025-01-22 PROBLEM — R74.01 TRANSAMINITIS: Status: ACTIVE | Noted: 2025-01-22

## 2025-01-22 PROBLEM — Q27.2 RENAL ARTERY ANOMALY: Status: ACTIVE | Noted: 2025-01-22

## 2025-01-22 PROBLEM — D75.1 POLYCYTHEMIA: Status: ACTIVE | Noted: 2025-01-22

## 2025-01-22 LAB
2HR DELTA HS TROPONIN: 2 NG/L
4HR DELTA HS TROPONIN: 2 NG/L
ALBUMIN SERPL BCG-MCNC: 4.4 G/DL (ref 3.5–5)
ALP SERPL-CCNC: 67 U/L (ref 34–104)
ALT SERPL W P-5'-P-CCNC: 58 U/L (ref 7–52)
ANION GAP SERPL CALCULATED.3IONS-SCNC: 10 MMOL/L (ref 4–13)
AST SERPL W P-5'-P-CCNC: 51 U/L (ref 13–39)
ATRIAL RATE: 79 BPM
BACTERIA UR QL AUTO: ABNORMAL /HPF
BASOPHILS # BLD AUTO: 0.06 THOUSANDS/ΜL (ref 0–0.1)
BASOPHILS NFR BLD AUTO: 1 % (ref 0–1)
BILIRUB SERPL-MCNC: 0.56 MG/DL (ref 0.2–1)
BILIRUB UR QL STRIP: NEGATIVE
BUN SERPL-MCNC: 26 MG/DL (ref 5–25)
CALCIUM SERPL-MCNC: 9.2 MG/DL (ref 8.4–10.2)
CARDIAC TROPONIN I PNL SERPL HS: 11 NG/L (ref ?–50)
CARDIAC TROPONIN I PNL SERPL HS: 13 NG/L (ref ?–50)
CARDIAC TROPONIN I PNL SERPL HS: 13 NG/L (ref ?–50)
CHLORIDE SERPL-SCNC: 97 MMOL/L (ref 96–108)
CLARITY UR: CLEAR
CO2 SERPL-SCNC: 26 MMOL/L (ref 21–32)
COLOR UR: ABNORMAL
CREAT SERPL-MCNC: 1.42 MG/DL (ref 0.6–1.3)
EOSINOPHIL # BLD AUTO: 0.08 THOUSAND/ΜL (ref 0–0.61)
EOSINOPHIL NFR BLD AUTO: 1 % (ref 0–6)
ERYTHROCYTE [DISTWIDTH] IN BLOOD BY AUTOMATED COUNT: 13.3 % (ref 11.6–15.1)
FLUAV AG UPPER RESP QL IA.RAPID: NEGATIVE
FLUBV AG UPPER RESP QL IA.RAPID: NEGATIVE
GFR SERPL CREATININE-BSD FRML MDRD: 36 ML/MIN/1.73SQ M
GLUCOSE SERPL-MCNC: 124 MG/DL (ref 65–140)
GLUCOSE UR STRIP-MCNC: NEGATIVE MG/DL
HCT VFR BLD AUTO: 51.3 % (ref 34.8–46.1)
HGB BLD-MCNC: 17 G/DL (ref 11.5–15.4)
HGB UR QL STRIP.AUTO: ABNORMAL
IMM GRANULOCYTES # BLD AUTO: 0.02 THOUSAND/UL (ref 0–0.2)
IMM GRANULOCYTES NFR BLD AUTO: 0 % (ref 0–2)
KETONES UR STRIP-MCNC: ABNORMAL MG/DL
LEUKOCYTE ESTERASE UR QL STRIP: NEGATIVE
LIPASE SERPL-CCNC: 28 U/L (ref 11–82)
LYMPHOCYTES # BLD AUTO: 1.44 THOUSANDS/ΜL (ref 0.6–4.47)
LYMPHOCYTES NFR BLD AUTO: 21 % (ref 14–44)
MCH RBC QN AUTO: 32.4 PG (ref 26.8–34.3)
MCHC RBC AUTO-ENTMCNC: 33.1 G/DL (ref 31.4–37.4)
MCV RBC AUTO: 98 FL (ref 82–98)
MONOCYTES # BLD AUTO: 0.82 THOUSAND/ΜL (ref 0.17–1.22)
MONOCYTES NFR BLD AUTO: 12 % (ref 4–12)
MUCOUS THREADS UR QL AUTO: ABNORMAL
NEUTROPHILS # BLD AUTO: 4.34 THOUSANDS/ΜL (ref 1.85–7.62)
NEUTS SEG NFR BLD AUTO: 65 % (ref 43–75)
NITRITE UR QL STRIP: NEGATIVE
NON-SQ EPI CELLS URNS QL MICRO: ABNORMAL /HPF
NRBC BLD AUTO-RTO: 0 /100 WBCS
P AXIS: 72 DEGREES
PH UR STRIP.AUTO: 5.5 [PH]
PLATELET # BLD AUTO: 172 THOUSANDS/UL (ref 149–390)
PLATELET # BLD AUTO: 191 THOUSANDS/UL (ref 149–390)
PMV BLD AUTO: 12 FL (ref 8.9–12.7)
PMV BLD AUTO: 12.4 FL (ref 8.9–12.7)
POTASSIUM SERPL-SCNC: 3.8 MMOL/L (ref 3.5–5.3)
PR INTERVAL: 116 MS
PROT SERPL-MCNC: 8 G/DL (ref 6.4–8.4)
PROT UR STRIP-MCNC: ABNORMAL MG/DL
QRS AXIS: 56 DEGREES
QRSD INTERVAL: 118 MS
QT INTERVAL: 438 MS
QTC INTERVAL: 502 MS
RBC # BLD AUTO: 5.25 MILLION/UL (ref 3.81–5.12)
RBC #/AREA URNS AUTO: ABNORMAL /HPF
SARS-COV+SARS-COV-2 AG RESP QL IA.RAPID: NEGATIVE
SODIUM SERPL-SCNC: 133 MMOL/L (ref 135–147)
SP GR UR STRIP.AUTO: >=1.05 (ref 1–1.03)
T WAVE AXIS: 64 DEGREES
TSH SERPL DL<=0.05 MIU/L-ACNC: 2.62 UIU/ML (ref 0.45–4.5)
UROBILINOGEN UR STRIP-ACNC: <2 MG/DL
VENTRICULAR RATE: 79 BPM
WBC # BLD AUTO: 6.76 THOUSAND/UL (ref 4.31–10.16)
WBC #/AREA URNS AUTO: ABNORMAL /HPF

## 2025-01-22 PROCEDURE — 84443 ASSAY THYROID STIM HORMONE: CPT

## 2025-01-22 PROCEDURE — 80053 COMPREHEN METABOLIC PANEL: CPT

## 2025-01-22 PROCEDURE — 87811 SARS-COV-2 COVID19 W/OPTIC: CPT | Performed by: EMERGENCY MEDICINE

## 2025-01-22 PROCEDURE — 96365 THER/PROPH/DIAG IV INF INIT: CPT

## 2025-01-22 PROCEDURE — 87804 INFLUENZA ASSAY W/OPTIC: CPT | Performed by: EMERGENCY MEDICINE

## 2025-01-22 PROCEDURE — 99285 EMERGENCY DEPT VISIT HI MDM: CPT | Performed by: EMERGENCY MEDICINE

## 2025-01-22 PROCEDURE — 96361 HYDRATE IV INFUSION ADD-ON: CPT

## 2025-01-22 PROCEDURE — 83690 ASSAY OF LIPASE: CPT

## 2025-01-22 PROCEDURE — 74177 CT ABD & PELVIS W/CONTRAST: CPT

## 2025-01-22 PROCEDURE — 71046 X-RAY EXAM CHEST 2 VIEWS: CPT

## 2025-01-22 PROCEDURE — 85025 COMPLETE CBC W/AUTO DIFF WBC: CPT

## 2025-01-22 PROCEDURE — 99285 EMERGENCY DEPT VISIT HI MDM: CPT | Performed by: SURGERY

## 2025-01-22 PROCEDURE — 93005 ELECTROCARDIOGRAM TRACING: CPT

## 2025-01-22 PROCEDURE — 84484 ASSAY OF TROPONIN QUANT: CPT

## 2025-01-22 PROCEDURE — 99222 1ST HOSP IP/OBS MODERATE 55: CPT | Performed by: INTERNAL MEDICINE

## 2025-01-22 PROCEDURE — 93010 ELECTROCARDIOGRAM REPORT: CPT | Performed by: STUDENT IN AN ORGANIZED HEALTH CARE EDUCATION/TRAINING PROGRAM

## 2025-01-22 PROCEDURE — 81001 URINALYSIS AUTO W/SCOPE: CPT | Performed by: EMERGENCY MEDICINE

## 2025-01-22 PROCEDURE — 85049 AUTOMATED PLATELET COUNT: CPT

## 2025-01-22 PROCEDURE — 99284 EMERGENCY DEPT VISIT MOD MDM: CPT

## 2025-01-22 PROCEDURE — 36415 COLL VENOUS BLD VENIPUNCTURE: CPT

## 2025-01-22 PROCEDURE — 84484 ASSAY OF TROPONIN QUANT: CPT | Performed by: EMERGENCY MEDICINE

## 2025-01-22 RX ORDER — ENOXAPARIN SODIUM 100 MG/ML
40 INJECTION SUBCUTANEOUS DAILY
Status: DISCONTINUED | OUTPATIENT
Start: 2025-01-23 | End: 2025-01-23 | Stop reason: HOSPADM

## 2025-01-22 RX ORDER — ASPIRIN 325 MG
325 TABLET ORAL DAILY
Status: CANCELLED | OUTPATIENT
Start: 2025-01-23

## 2025-01-22 RX ORDER — MAGNESIUM SULFATE 1 G/100ML
1 INJECTION INTRAVENOUS ONCE
Status: COMPLETED | OUTPATIENT
Start: 2025-01-22 | End: 2025-01-22

## 2025-01-22 RX ORDER — LEVOTHYROXINE SODIUM 112 UG/1
112 TABLET ORAL
Status: DISCONTINUED | OUTPATIENT
Start: 2025-01-23 | End: 2025-01-23 | Stop reason: HOSPADM

## 2025-01-22 RX ORDER — ATORVASTATIN CALCIUM 40 MG/1
40 TABLET, FILM COATED ORAL
Status: DISCONTINUED | OUTPATIENT
Start: 2025-01-22 | End: 2025-01-23 | Stop reason: HOSPADM

## 2025-01-22 RX ORDER — NIFEDIPINE 60 MG/1
60 TABLET, EXTENDED RELEASE ORAL DAILY
Status: DISCONTINUED | OUTPATIENT
Start: 2025-01-22 | End: 2025-01-23 | Stop reason: HOSPADM

## 2025-01-22 RX ORDER — HYDRALAZINE HYDROCHLORIDE 20 MG/ML
5 INJECTION INTRAMUSCULAR; INTRAVENOUS EVERY 6 HOURS PRN
Status: DISCONTINUED | OUTPATIENT
Start: 2025-01-22 | End: 2025-01-22

## 2025-01-22 RX ORDER — NICOTINE 21 MG/24HR
1 PATCH, TRANSDERMAL 24 HOURS TRANSDERMAL DAILY
Status: DISCONTINUED | OUTPATIENT
Start: 2025-01-22 | End: 2025-01-23 | Stop reason: HOSPADM

## 2025-01-22 RX ORDER — CALCIUM CARBONATE 500 MG/1
1000 TABLET, CHEWABLE ORAL DAILY PRN
Status: DISCONTINUED | OUTPATIENT
Start: 2025-01-22 | End: 2025-01-23 | Stop reason: HOSPADM

## 2025-01-22 RX ORDER — LABETALOL HYDROCHLORIDE 5 MG/ML
10 INJECTION, SOLUTION INTRAVENOUS EVERY 4 HOURS PRN
Status: DISCONTINUED | OUTPATIENT
Start: 2025-01-22 | End: 2025-01-23 | Stop reason: HOSPADM

## 2025-01-22 RX ADMIN — MAGNESIUM SULFATE 1 G: 1 INJECTION INTRAVENOUS at 14:09

## 2025-01-22 RX ADMIN — NIFEDIPINE 60 MG: 60 TABLET, FILM COATED, EXTENDED RELEASE ORAL at 21:25

## 2025-01-22 RX ADMIN — LABETALOL HYDROCHLORIDE 10 MG: 5 INJECTION, SOLUTION INTRAVENOUS at 21:02

## 2025-01-22 RX ADMIN — ATORVASTATIN CALCIUM 40 MG: 40 TABLET, FILM COATED ORAL at 18:46

## 2025-01-22 RX ADMIN — SODIUM CHLORIDE 500 ML: 0.9 INJECTION, SOLUTION INTRAVENOUS at 13:31

## 2025-01-22 RX ADMIN — IOHEXOL 85 ML: 350 INJECTION, SOLUTION INTRAVENOUS at 13:56

## 2025-01-22 NOTE — DISCHARGE INSTRUCTIONS
CT abdomen pelvis with contrast  Result Date: 1/22/2025  Impression: -Mild left cortical atrophy and delayed nephrogram. There is filling defects within the proximal and distal left renal artery resulting in high-grade to possibly near complete stenosis. Differential includes multifocal thrombus, age-indeterminate dissection versus noncalcified atheromatous plaque. Recommend vascular surgery consultation and dedicated CTA of the abdomen can be obtained for better characterization. -Circumferential bladder wall thickening and bilateral urothelial thickening and enhancement. Correlate with urinalysis for cystitis and ascending infection. -Diffuse colonic wall thickening which may be due to colitis versus underdistention. Clinical correlation advised. -Scattered areas of peripheral centrilobular nodules in the right lower lobe which may be due to aspiration or infectious/inflammatory bronchiolitis.     XR chest 2 views  Result Date: 1/22/2025  Impression: No acute cardiopulmonary disease. Workstation performed: [id number]

## 2025-01-22 NOTE — ASSESSMENT & PLAN NOTE
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  Per patient, she was attempting to do laundry when she suddenly became very dizzy and felt as though she needed to sit down.  CT abdomen and pelvis significant for \"filling defects within the proximal and distal left renal artery resulting in high-grade to possibly near complete stenosis. Differential includes multifocal thrombus, age-indeterminate dissection versus noncalcified atheromatous plaque. Recommend vascular surgery consultation and dedicated CTA of the abdomen can be obtained for better characterization\"  Given recent CT contrast load and downtrending GFR, recommended no additional imaging at this time.  Will get CTA when GFR improves per vascular surgery    PLAN:  Serial BMP to monitor kidney function  Goal normotension  Placed consult to nephrology regarding recommendations for HTN, GERMÁN, and polycythemia. Appreciate recommendations.   Recommended nifedipine 60 mg daily.  Recommend holding on ACE/ARB due to kidney function past  IV labetalol 10 mg as needed for blood pressure 180/110  Avoid nephrotoxic medications as able.  Will hold off on additional contrast imaging at this time  Aspirin 81 mg and Lipitor 40 mg per vascular sx  Started on Lipitor 40 mg  Per chart review, patient has allergic reaction to aspirin listed as \"hives\" will confirm allergy with patient and evaluate aspirin use  Vascular surgery on board.  Appreciate recommendations  Ordered renin, aldosterone, and cortisol per nephrology recommendations  "

## 2025-01-22 NOTE — CONSULTS
"Consultation - Vascular Surgery   Name: Alyse Gray 74 y.o. female I MRN: [id number]  Unit/Bed#: ED-11 I Date of Admission: 1/22/2025   Date of Service: 1/22/2025 I Hospital Day: 0   Inpatient consult to Vascular Surgery  Consult performed by: Kirsty Carter PA-C  Consult ordered by: Venkatesh Schreiber MD        Physician Requesting Evaluation: Venkatesh Schreiber MD   Reason for Evaluation / Principal Problem: CT findings    Assessment & Plan  Renal artery anomaly  - CTAP: Mild left cortical atrophy and delayed nephrogram. There is filling defects within the proximal and distal left renal artery resulting in high-grade to possibly near complete stenosis. Differential includes multifocal thrombus, age-indeterminate dissection versus noncalcified atheromatous plaqueincidental finding.  - incidental finding, likely chronic given no abdominal pain. Nontender  - recommend asa 81 mg, statin, smoking cessation  - f/u in office with vascular surgery    Rest of care per primary. Discussed with attending on call  Please contact the SecureChat role for the Vascular Surgery service with any questions/concerns.    History of Present Illness   Alyse Gray is a 74 y.o. female PMH hypothyroidism, HTN (not on any medication) who presents with lightheadedness/dizziness since today. Pt works at a nursing home, was doing laundry and felt lightheaded and dizzy. She sat down but states the symptoms did not improve so came to ED. She denies losing consciousness, blurry vision. States currently those symptoms have resolved. Does endorse headache currently. Denies hx of clots in the past. Denies prior hx of AC. Does endorse taking ASA 81 mg \"when I feel like it\". Smoking hx x60 years. Currently smokes 10 cigarettes/day. Denies abdominal pain.    Review of Systems   Constitutional:  Negative for chills and fever.   Respiratory: Negative.     Cardiovascular: Negative.    Gastrointestinal: Negative.    Genitourinary: Negative. "    Neurological:  Positive for dizziness, weakness, light-headedness and headaches.   All other systems reviewed and are negative.    I have reviewed the patient's PMH, PSH, Social History, Family History, Meds, and Allergies  Historical Information   Past Medical History:   Diagnosis Date    Disease of thyroid gland     Polymyalgia rheumatica (HCC)      Past Surgical History:   Procedure Laterality Date    CHOLECYSTECTOMY      FOOT SURGERY      GALLBLADDER SURGERY      SHOULDER ARTHROSCOPY Right      Social History     Tobacco Use    Smoking status: Every Day     Current packs/day: 0.50     Average packs/day: 0.5 packs/day for 53.0 years (26.5 ttl pk-yrs)     Types: Cigarettes     Passive exposure: Current    Smokeless tobacco: Never   Vaping Use    Vaping status: Never Used   Substance and Sexual Activity    Alcohol use: No    Drug use: No    Sexual activity: Not Currently     E-Cigarette/Vaping    E-Cigarette Use Never User      E-Cigarette/Vaping Substances     Family History   Problem Relation Age of Onset    Heart disease Mother     Diabetes Mother     Hypertension Mother     Breast cancer Sister     Colon cancer Brother     Heart disease Brother     Diabetes Brother     Hypertension Brother      Social History     Tobacco Use    Smoking status: Every Day     Current packs/day: 0.50     Average packs/day: 0.5 packs/day for 53.0 years (26.5 ttl pk-yrs)     Types: Cigarettes     Passive exposure: Current    Smokeless tobacco: Never   Vaping Use    Vaping status: Never Used   Substance and Sexual Activity    Alcohol use: No    Drug use: No    Sexual activity: Not Currently       Objective :  Temp:  [97.5 °F (36.4 °C)] 97.5 °F (36.4 °C)  HR:  [75-89] 78  BP: (142-182)/() 182/77  Resp:  [18] 18  SpO2:  [96 %-98 %] 98 %  O2 Device: None (Room air)      Physical Exam  Vitals and nursing note reviewed.   Constitutional:       General: She is not in acute distress.     Appearance: Normal appearance. She is normal  weight. She is not ill-appearing, toxic-appearing or diaphoretic.   HENT:      Head: Normocephalic and atraumatic.   Cardiovascular:      Rate and Rhythm: Normal rate.   Pulmonary:      Effort: Pulmonary effort is normal. No respiratory distress.   Abdominal:      General: Abdomen is flat. There is no distension.      Palpations: Abdomen is soft.      Tenderness: There is no abdominal tenderness. There is no guarding or rebound.   Musculoskeletal:         General: No swelling or tenderness.   Skin:     General: Skin is warm.      Capillary Refill: Capillary refill takes less than 2 seconds.   Neurological:      General: No focal deficit present.      Mental Status: She is alert and oriented to person, place, and time.   Psychiatric:         Mood and Affect: Mood normal.         Behavior: Behavior normal.         Lab Results: I have reviewed the following results:  Recent Labs     01/22/25  1138 01/22/25  1328 01/22/25  1525   WBC 6.76  --   --    HGB 17.0*  --   --    HCT 51.3*  --   --      --   --    SODIUM 133*  --   --    K 3.8  --   --    CL 97  --   --    CO2 26  --   --    BUN 26*  --   --    CREATININE 1.42*  --   --    GLUC 124  --   --    AST 51*  --   --    ALT 58*  --   --    ALB 4.4  --   --    TBILI 0.56  --   --    ALKPHOS 67  --   --    HSTNI0  --  11  --    HSTNI2  --   --  13       Imaging Results Review: I reviewed radiology reports from this admission including: CT abdomen/pelvis.  Other Study Results Review: No additional pertinent studies reviewed.    VTE Pharmacologic Prophylaxis: VTE covered by:    None     VTE Mechanical Prophylaxis: sequential compression device

## 2025-01-22 NOTE — ASSESSMENT & PLAN NOTE
Hemoglobin elevated at 17  Of note, patient does report poor p.o. intake over the past few days.  Patient has had multiple episodes of elevated hemoglobin between 15-16 within the past 2 years  Also has notable smoking history and possible sleep apnea     PLAN:  Consult placed to nephrology. Appreciate recommendations   Counseled on smoking cessation  Consider sleep study in outpatient setting

## 2025-01-22 NOTE — ASSESSMENT & PLAN NOTE
Wt Readings from Last 3 Encounters:   12/02/24 74.6 kg (164 lb 6.4 oz)   09/19/24 74.5 kg (164 lb 3.2 oz)   05/20/24 77 kg (169 lb 12.8 oz)   Most recent echo 2024 EF 60% with G1 DD  Does not appear volume overloaded on exam

## 2025-01-22 NOTE — ED PROVIDER NOTES
Time reflects when diagnosis was documented in both MDM as applicable and the Disposition within this note       Time User Action Codes Description Comment    1/22/2025  1:37 PM Legare, Sudeeper A Add [R55] Near syncope     1/22/2025  1:37 PM Legare, Sudeeper A Add [R03.0] Elevated blood pressure reading     1/22/2025  1:37 PM Legare, Christopher A Add [R10.9] Acute abdominal pain     1/22/2025  1:37 PM Legare, Christopher A Add [R79.89] Elevated LFTs     1/22/2025  3:03 PM Legare, Christopher A Add [I70.1] Renal artery stenosis (HCC)     1/22/2025  3:03 PM Legare, Christshylaer A Modify [R55] Near syncope     1/22/2025  3:03 PM Legare, Christopher A Modify [I70.1] Renal artery stenosis (HCC)     1/22/2025  3:03 PM Legare, Sudeeper A Add [R79.89] Elevated serum creatinine     1/22/2025  3:03 PM Legare, Christopher A Add [K52.9] Colitis     1/22/2025  4:08 PM Legare, Sudeeper A Add [R19.7] Diarrhea     1/22/2025  4:49 PM Legtracie, Sudeeper A Add [R94.31] Long QT interval           ED Disposition       ED Disposition   Admit    Condition   Stable    Date/Time   Wed Jan 22, 2025  4:49 PM    Comment   Case was discussed with PHILIPPE and the patient's admission status was agreed to be Admission Status: inpatient status to the service of Dr. Mclean .               Assessment & Plan       Medical Decision Making  Patient is a 74-year-old female, with a history significant for polymyalgia rheumatica and tobacco use per my review the medical record, who presents to the ED today for evaluation of lightheadedness/near syncope.  Patient states that, for about one week, she has had flulike symptoms characterized as myalgias, cough (now productive), congestion, nonbloody diarrhea (no history of A-fib/recent hospitalizations/recent travel/recent antibiotic use).  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  Patient  states she did not pass out and she sat down and this remitted her symptoms partially.  Patient states that, because of her above-mentioned symptoms, she has had very little p.o. intake over the last few days.  Patient denies fever, chest pain, dyspnea, dysuria, polyuria, weakness, numbness.  Patient denies history of similar symptoms.  Patient is currently afebrile and hemodynamically stable.  Physical exam is notable for diffuse abdominal pain with neither rebound or guarding, clear heart and lungs, dry mucous membranes.  This presentation is concerning for: PEG, electrolyte abnormality, dehydration, orthostatic hypotension. ACS, arrhythmia, bowel obstruction, pancreatitis also considered.  Patient at risk for appendicitis, pneumonia, viral syndrome.  Will investigate with cardiac workup, lipase, CT abdomen pelvis.  Will manage with fluids, further based on workup.    Amount and/or Complexity of Data Reviewed  Labs: ordered. Decision-making details documented in ED Course.  Radiology: ordered.    Risk  Prescription drug management.  Decision regarding hospitalization.        ED Course as of 01/22/25 1826   Wed Jan 22, 2025   1301 Creatinine(!): 1.42  Elevated, not PEG    1301 Hemoglobin(!): 17.0  High    1328 ECG per my independent interpretation: Normal rate, regular rhythm, PVCs present, normal axis, no ST elevations or depressions. RBBB seen on prior.  QTc 502.  No morphology consistent with Brugada, arrhythmogenic right ventricular dysplasia, WPW, hypertrophic cardiomyopathy   1337 LIPASE: 28  WNL    1437 Discussed CT imaging with radiologist Ajit Cho: Concern for renal thrombus.  Ultimately patient will likely need CTA; however, given increasing creatinine/borderline GFR, recommendation to discuss first with vascular surgery   1534 I discussed case with Ingrid Guillermo from vascular surgery: Vascular will evaluate   1542 Results to this point reviewed with patient and her son.  Patient would like to not get  immediate CTA given recent contrast load and decreased kidney function   1607 Patient agreeable with admission       Medications   levothyroxine tablet 112 mcg (has no administration in time range)   calcium carbonate (TUMS) chewable tablet 1,000 mg (has no administration in time range)   nicotine (NICODERM CQ) 14 mg/24hr TD 24 hr patch 1 patch (has no administration in time range)   enoxaparin (LOVENOX) subcutaneous injection 40 mg (has no administration in time range)   hydrALAZINE (APRESOLINE) injection 5 mg (has no administration in time range)   sodium chloride 0.9 % bolus 500 mL (0 mL Intravenous Stopped 1/22/25 1439)   magnesium sulfate IVPB (premix) SOLN 1 g (0 g Intravenous Stopped 1/22/25 1520)   iohexol (OMNIPAQUE) 350 MG/ML injection (MULTI-DOSE) 85 mL (85 mL Intravenous Given 1/22/25 1356)       ED Risk Strat Scores                          SBIRT 22yo+      Flowsheet Row Most Recent Value   Initial Alcohol Screen: US AUDIT-C     1. How often do you have a drink containing alcohol? 0 Filed at: 01/22/2025 1244   2. How many drinks containing alcohol do you have on a typical day you are drinking?  0 Filed at: 01/22/2025 1244   3b. FEMALE Any Age, or MALE 65+: How often do you have 4 or more drinks on one occassion? 0 Filed at: 01/22/2025 1244   Audit-C Score 0 Filed at: 01/22/2025 1244   MARIAMA: How many times in the past year have you...    Used an illegal drug or used a prescription medication for non-medical reasons? Never Filed at: 01/22/2025 1244                            History of Present Illness       Chief Complaint   Patient presents with    Syncope     Patient states has had the flu for the last few days and states went to work today but was moving things around started to feel very dizzy like she was going to fall   But did not fall        Past Medical History:   Diagnosis Date    Disease of thyroid gland     Polymyalgia rheumatica (HCC)       Past Surgical History:   Procedure Laterality Date     CHOLECYSTECTOMY      FOOT SURGERY      GALLBLADDER SURGERY      SHOULDER ARTHROSCOPY Right       Family History   Problem Relation Age of Onset    Heart disease Mother     Diabetes Mother     Hypertension Mother     Breast cancer Sister     Colon cancer Brother     Heart disease Brother     Diabetes Brother     Hypertension Brother       Social History     Tobacco Use    Smoking status: Every Day     Current packs/day: 0.50     Average packs/day: 0.5 packs/day for 53.0 years (26.5 ttl pk-yrs)     Types: Cigarettes     Passive exposure: Current    Smokeless tobacco: Never   Vaping Use    Vaping status: Never Used   Substance Use Topics    Alcohol use: No    Drug use: No      E-Cigarette/Vaping    E-Cigarette Use Never User       E-Cigarette/Vaping Substances      I have reviewed and agree with the history as documented.     Patient is a 74-year-old female, with a history significant for polymyalgia rheumatica and tobacco use per my review the medical record, who presents to the ED today for evaluation of lightheadedness/near syncope.  Patient states that, for about one week, she has had flulike symptoms characterized as myalgias, cough (now productive), congestion, nonbloody diarrhea (no history of A-fib/recent hospitalizations/recent travel/recent antibiotic use).  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  Patient states she did not pass out and she sat down and this remitted her symptoms partially.  Patient states that, because of her above-mentioned symptoms, she has had very little p.o. intake over the last few days.  Patient denies fever, chest pain, dyspnea, dysuria, polyuria, weakness, numbness.  Patient denies history of similar symptoms.  Patient is without other concerns at this time.        Review of Systems   Constitutional:  Negative for fever.   HENT:  Negative for trouble swallowing.     Eyes:  Negative for visual disturbance.   Respiratory:  Positive for cough. Negative for shortness of breath.    Cardiovascular:  Negative for chest pain.   Gastrointestinal:  Positive for abdominal pain, diarrhea and vomiting. Negative for blood in stool.   Endocrine: Negative for polyuria.   Genitourinary:  Negative for dysuria.   Musculoskeletal:  Negative for gait problem.   Skin:  Negative for rash.   Allergic/Immunologic: Positive for environmental allergies.   Neurological:  Negative for weakness and numbness.   Hematological:  Negative for adenopathy.   Psychiatric/Behavioral:  Negative for confusion.    All other systems reviewed and are negative.          Objective       ED Triage Vitals   Temperature Pulse Blood Pressure Respirations SpO2 Patient Position - Orthostatic VS   01/22/25 1135 01/22/25 1135 01/22/25 1135 01/22/25 1135 01/22/25 1135 01/22/25 1300   97.5 °F (36.4 °C) 89 (!) 178/118 18 97 % Lying      Temp src Heart Rate Source BP Location FiO2 (%) Pain Score    -- 01/22/25 1300 01/22/25 1300 -- 01/22/25 1135     Monitor Right arm  8      Vitals      Date and Time Temp Pulse SpO2 Resp BP Pain Score FACES Pain Rating User   01/22/25 1502 -- 78 98 % 18 182/77 No Pain -- SE   01/22/25 1300 -- 75 96 % 18 142/67 -- -- SE   01/22/25 1248 -- -- -- -- -- 8 -- SE   01/22/25 1135 97.5 °F (36.4 °C) 89 97 % 18 178/118 8 -- AP            Physical Exam  Vitals and nursing note reviewed.   Constitutional:       General: She is not in acute distress.     Appearance: She is not toxic-appearing.   HENT:      Head: Normocephalic and atraumatic.      Right Ear: External ear normal.      Left Ear: External ear normal.      Nose: Nose normal. No rhinorrhea.      Mouth/Throat:      Mouth: Mucous membranes are dry.      Pharynx: Oropharynx is clear. No oropharyngeal exudate or posterior oropharyngeal erythema.      Comments: Uvula midline. No oropharyngeal or submandibular mass/swelling  Eyes:      General: No scleral  icterus.        Right eye: No discharge.         Left eye: No discharge.      Conjunctiva/sclera: Conjunctivae normal.      Pupils: Pupils are equal, round, and reactive to light.   Neck:      Comments: Patient is spontaneously rotating their neck to the left and right during the history and physical exam interaction without difficulty or apparent discomfort    Cardiovascular:      Rate and Rhythm: Normal rate and regular rhythm.      Pulses: Normal pulses.      Heart sounds: Normal heart sounds. No murmur heard.     No friction rub. No gallop.      Comments: 2+ Radial  Pulmonary:      Effort: Pulmonary effort is normal. No respiratory distress.      Breath sounds: Normal breath sounds. No stridor. No wheezing, rhonchi or rales.   Abdominal:      General: Abdomen is flat. There is no distension.      Palpations: Abdomen is soft.      Tenderness: There is abdominal tenderness (Diffuse). There is no right CVA tenderness, left CVA tenderness, guarding or rebound.   Musculoskeletal:         General: No tenderness (No pain with calf squeeze) or deformity.      Cervical back: Neck supple. No tenderness. No muscular tenderness.      Right lower leg: No edema.      Left lower leg: No edema.   Lymphadenopathy:      Cervical: No cervical adenopathy.   Skin:     General: Skin is warm and dry.      Capillary Refill: Capillary refill takes less than 2 seconds.   Neurological:      Mental Status: She is alert.      Comments: Patient is speaking clearly in complete sentences.  Patient is answering appropriately and able follow commands.  Patient is moving all four extremities spontaneously.  No facial droop.  Tongue midline.      Psychiatric:         Mood and Affect: Mood normal.         Behavior: Behavior normal.         Results Reviewed       Procedure Component Value Units Date/Time    TSH, 3rd generation [[id number]] Collected: 01/22/25 1525    Lab Status: In process Specimen: Blood from Arm, Right Updated: 01/22/25 6543     Platelet count [[id number]]     Lab Status: No result Specimen: Blood     Urine Microscopic [[id number]]  (Abnormal) Collected: 01/22/25 1523    Lab Status: Final result Specimen: Urine, Clean Catch Updated: 01/22/25 1609     RBC, UA 1-2 /hpf      WBC, UA 1-2 /hpf      Epithelial Cells Occasional /hpf      Bacteria, UA Occasional /hpf      MUCUS THREADS Occasional    Stool Enteric Bacterial Panel by PCR [[id number]]     Lab Status: No result Specimen: Stool     Clostridium difficile toxin by PCR with EIA [[id number]]     Lab Status: No result Specimen: Stool     HS Troponin I 2hr [[id number]]  (Normal) Collected: 01/22/25 1525    Lab Status: Final result Specimen: Blood from Arm, Right Updated: 01/22/25 1552     hs TnI 2hr 13 ng/L      Delta 2hr hsTnI 2 ng/L     UA w Reflex to Microscopic w Reflex to Culture [[id number]]  (Abnormal) Collected: 01/22/25 1523    Lab Status: Final result Specimen: Urine, Clean Catch Updated: 01/22/25 1546     Color, UA Light Yellow     Clarity, UA Clear     Specific Gravity, UA >=1.050     pH, UA 5.5     Leukocytes, UA Negative     Nitrite, UA Negative     Protein, UA 30 (1+) mg/dl      Glucose, UA Negative mg/dl      Ketones, UA Trace mg/dl      Urobilinogen, UA <2.0 mg/dl      Bilirubin, UA Negative     Occult Blood, UA Trace    HS Troponin I 4hr [[id number]]     Lab Status: No result Specimen: Blood     HS Troponin 0hr (reflex protocol) [[id number]]  (Normal) Collected: 01/22/25 1328    Lab Status: Final result Specimen: Blood from Arm, Right Updated: 01/22/25 1417     hs TnI 0hr 11 ng/L     COVID-19/ Infleunza A/B Rapid Anitgen(30 min. TAT) [[id number]]  (Normal) Collected: 01/22/25 1329    Lab Status: Final result Specimen: Nares from Nose Updated: 01/22/25 1351     SARS COV Rapid Antigen Negative     Influenza A Rapid Antigen Negative     Influenza B Rapid Antigen Negative    Narrative:      This test has been performed using the [facility] 2 FLU+SARS Antigen test under the Emergency  Use Authorization (EUA). This test has been validated by the  and verified by the performing laboratory. The [place] uses lateral flow immunofluorescent sandwich assay to detect SARS-COV, Influenza A and Influenza B Antigen.     The [facility] 2 SARS Antigen test does not differentiate between SARS-CoV and SARS-CoV-2.     Negative results are presumptive and may be confirmed with a molecular assay, if necessary, for patient management. Negative results do not rule out SARS-CoV-2 or influenza infection and should not be used as the sole basis for treatment or patient management decisions. A negative test result may occur if the level of antigen in a sample is below the limit of detection of this test.     Positive results are indicative of the presence of viral antigens, but do not rule out bacterial infection or co-infection with other viruses.     All test results should be used as an adjunct to clinical observations and other information available to the provider.    FOR PEDIATRIC PATIENTS - copy/paste COVID Guidelines URL to browser: [email or web address]    Lipase [[id number]]  (Normal) Collected: 01/22/25 1138    Lab Status: Final result Specimen: Blood from Arm, Right Updated: 01/22/25 1337     Lipase 28 u/L     Comprehensive metabolic panel [[id number]]  (Abnormal) Collected: 01/22/25 1138    Lab Status: Final result Specimen: Blood from Arm, Right Updated: 01/22/25 1203     Sodium 133 mmol/L      Potassium 3.8 mmol/L      Chloride 97 mmol/L      CO2 26 mmol/L      ANION GAP 10 mmol/L      BUN 26 mg/dL      Creatinine 1.42 mg/dL      Glucose 124 mg/dL      Calcium 9.2 mg/dL      AST 51 U/L      ALT 58 U/L      Alkaline Phosphatase 67 U/L      Total Protein 8.0 g/dL      Albumin 4.4 g/dL      Total Bilirubin 0.56 mg/dL      eGFR 36 ml/min/1.73sq m     Narrative:      National Kidney Disease Foundation guidelines for Chronic Kidney Disease (CKD):      Stage 1 with normal or high GFR (GFR > 90 mL/min/1.73 square meters)    Stage 2 Mild CKD (GFR = 60-89 mL/min/1.73 square meters)    Stage 3A Moderate CKD (GFR = 45-59 mL/min/1.73 square meters)    Stage 3B Moderate CKD (GFR = 30-44 mL/min/1.73 square meters)    Stage 4 Severe CKD (GFR = 15-29 mL/min/1.73 square meters)    Stage 5 End Stage CKD (GFR <15 mL/min/1.73 square meters)  Note: GFR calculation is accurate only with a steady state creatinine    CBC and differential [[id number]]  (Abnormal) Collected: 01/22/25 1138    Lab Status: Final result Specimen: Blood from Arm, Right Updated: 01/22/25 1154     WBC 6.76 Thousand/uL      RBC 5.25 Million/uL      Hemoglobin 17.0 g/dL      Hematocrit 51.3 %      MCV 98 fL      MCH 32.4 pg      MCHC 33.1 g/dL      RDW 13.3 %      MPV 12.0 fL      Platelets 191 Thousands/uL      nRBC 0 /100 WBCs      Segmented % 65 %      Immature Grans % 0 %      Lymphocytes % 21 %      Monocytes % 12 %      Eosinophils Relative 1 %      Basophils Relative 1 %      Absolute Neutrophils 4.34 Thousands/µL      Absolute Immature Grans 0.02 Thousand/uL      Absolute Lymphocytes 1.44 Thousands/µL      Absolute Monocytes 0.82 Thousand/µL      Eosinophils Absolute 0.08 Thousand/µL      Basophils Absolute 0.06 Thousands/µL             XR chest 2 views   Final Interpretation by Kristian Rust MD (01/22 5099)      No acute cardiopulmonary disease.            Workstation performed: [id number]         CT abdomen pelvis with contrast   Final Interpretation by Ajit Cho MD (01/22 5332)      -Mild left cortical atrophy and delayed nephrogram. There is filling defects within the proximal and distal left renal artery resulting in high-grade to possibly near complete stenosis. Differential includes multifocal thrombus, age-indeterminate    dissection versus noncalcified atheromatous plaque. Recommend vascular surgery consultation and dedicated CTA of the abdomen can be obtained for better  characterization.      -Circumferential bladder wall thickening and bilateral urothelial thickening and enhancement. Correlate with urinalysis for cystitis and ascending infection.      -Diffuse colonic wall thickening which may be due to colitis versus underdistention. Clinical correlation advised.      -Scattered areas of peripheral centrilobular nodules in the right lower lobe which may be due to aspiration or infectious/inflammatory bronchiolitis.      I personally communicated the findings via telephone with Venkatesh Schreiber at 2:34 pm. on 1/22/2025.         Workstation performed: [id number]             Procedures    ED Medication and Procedure Management   Prior to Admission Medications   Prescriptions Last Dose Informant Patient Reported? Taking?   cholecalciferol (VITAMIN D3) 1,000 units tablet 1/21/2025 Self No Yes   Sig: Take 1 tablet (1,000 Units total) by mouth daily   levothyroxine 112 mcg tablet 1/22/2025  No Yes   Sig: Take 1 tablet by mouth once daily   predniSONE 20 mg tablet Past Month  No Yes   Sig: 3 tab po qd x 2 then 2 tab po qd x 2 then 1 tab po qd x 2      Facility-Administered Medications: None     Current Discharge Medication List        CONTINUE these medications which have NOT CHANGED    Details   cholecalciferol (VITAMIN D3) 1,000 units tablet Take 1 tablet (1,000 Units total) by mouth daily  Qty: 90 tablet, Refills: 3    Associated Diagnoses: Current chronic use of systemic steroids      levothyroxine 112 mcg tablet Take 1 tablet by mouth once daily  Qty: 90 tablet, Refills: 1    Associated Diagnoses: Hypothyroidism, unspecified type      predniSONE 20 mg tablet 3 tab po qd x 2 then 2 tab po qd x 2 then 1 tab po qd x 2  Qty: 12 tablet, Refills: 0    Associated Diagnoses: Pruritic dermatitis           No discharge procedures on file.  ED SEPSIS DOCUMENTATION   Time reflects when diagnosis was documented in both MDM as applicable and the Disposition within this note       Time User  Action Codes Description Comment    1/22/2025  1:37 PM Candie, Venkatesh OAKLEY Add [R55] Near syncope     1/22/2025  1:37 PM Legtracie, Venkatesh A Add [R03.0] Elevated blood pressure reading     1/22/2025  1:37 PM Legtracie, Sudeeper A Add [R10.9] Acute abdominal pain     1/22/2025  1:37 PM Legtracie, Venkatesh A Add [R79.89] Elevated LFTs     1/22/2025  3:03 PM Legtracie, Sudeeper A Add [I70.1] Renal artery stenosis (HCC)     1/22/2025  3:03 PM Legare, Sudeeper A Modify [R55] Near syncope     1/22/2025  3:03 PM Venkatesh Schreiber A Modify [I70.1] Renal artery stenosis (HCC)     1/22/2025  3:03 PM Legare, Sudeeper A Add [R79.89] Elevated serum creatinine     1/22/2025  3:03 PM Venkatesh Schreiber Add [K52.9] Colitis     1/22/2025  4:08 PM Candie, Venkatesh A Add [R19.7] Diarrhea     1/22/2025  4:49 PM Candie, Venkatesh OAKLEY Add [R94.31] Long QT interval                  Venkatesh Schreiber MD  01/22/25 2338       Venkatesh Schreiber MD  01/22/25 3395

## 2025-01-22 NOTE — ASSESSMENT & PLAN NOTE
60+ year history of tobacco abuse  Currently smokes about 10 cigarettes/day  Nicotine patch ordered  Counseled on smoking cessation

## 2025-01-22 NOTE — H&P
"H&P - Hospitalist   Name: Alyse Gray 74 y.o. 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  Per patient, she was attempting to do laundry when she suddenly became very dizzy and felt as though she needed to sit down.  CT abdomen and pelvis significant for \"filling defects within the proximal and distal left renal artery resulting in high-grade to possibly near complete stenosis. Differential includes multifocal thrombus, age-indeterminate dissection versus noncalcified atheromatous plaque. Recommend vascular surgery consultation and dedicated CTA of the abdomen can be obtained for better characterization\"  Given recent CT contrast load and downtrending GFR, recommended no additional imaging at this time.  Will get CTA when GFR improves per vascular surgery    PLAN:  Serial BMP to monitor kidney function  Goal normotension  Placed consult to nephrology regarding recommendations for HTN, GERMÁN, and polycythemia. Appreciate recommendations.   Recommended nifedipine 60 mg daily.  Recommend holding on ACE/ARB due to kidney function past  IV labetalol 10 mg as needed for blood pressure 180/110  Avoid nephrotoxic medications as able.  Will hold off on additional contrast imaging at this time  Aspirin 81 mg and Lipitor 40 mg per vascular sx  Started on Lipitor 40 mg  Per chart review, patient has allergic reaction to aspirin listed as \"hives\" will confirm allergy with patient and evaluate aspirin use  Vascular surgery on board.  Appreciate recommendations  Ordered renin, aldosterone, and cortisol per nephrology recommendations  Primary hypothyroidism  Continue levothyroxine 112 mcg tablet daily  Most " recent TSH 4 months ago wnl at 3.4  PMR (polymyalgia rheumatica) (HCC)  Past medical history significant for polymyalgia rheumatica.  States she will occasionally take prednisone for her flares  Tobacco abuse  60+ year history of tobacco abuse  Currently smokes about 10 cigarettes/day  Nicotine patch ordered  Counseled on smoking cessation  Polycythemia  Hemoglobin elevated at 17  Of note, patient does report poor p.o. intake over the past few days.  Patient has had multiple episodes of elevated hemoglobin between 15-16 within the past 2 years  Also has notable smoking history and possible sleep apnea     PLAN:  Consult placed to nephrology. Appreciate recommendations   Counseled on smoking cessation  Consider sleep study in outpatient setting  Primary hypertension  History of primary hypertension  BP elevated today 188/72  Will likely need to be discharged on BP medication if persistently hypertensive  Consult placed to nephrology. Appreciate recommendations   Transaminitis  Elevated liver enzymes noted on CMP  AST 51 ALT 58  Will continue to trend CMP  Prolonged Q-T interval on ECG  QTc elevated at 502 on EKG  Avoid QTc prolonging medications  Patient on telemetry  Diarrhea  Complaining of multiple episodes of diarrhea on Monday, 1/20/2025.  Denies any melena or gross rectal bleeding.  Denies presence of any mucus in stool  C. difficile and stool studies pending  Diffuse colonic wall thickening which may be due to colitis versus underdistention per CT AP  Mild LLQ abdominal pain on palpitation   Diastolic CHF (HCC)  Wt Readings from Last 3 Encounters:   12/02/24 74.6 kg (164 lb 6.4 oz)   09/19/24 74.5 kg (164 lb 3.2 oz)   05/20/24 77 kg (169 lb 12.8 oz)   Most recent echo 2024 EF 60% with G1 DD  Does not appear volume overloaded on exam      VTE Pharmacologic Prophylaxis: VTE Score: 6 High Risk (Score >/= 5) - Pharmacological DVT Prophylaxis Ordered: enoxaparin (Lovenox). Sequential Compression Devices  "Ordered.  Code Status: Level 1  Discussion with family: Updated  (niece) at bedside.    Anticipated Length of Stay: Patient will be admitted on an observation basis with an anticipated length of stay of less than 2 midnights secondary to HTN.    History of Present Illness   Chief Complaint: Dizziness    Alyse Gray is a 74 y.o. 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  Per patient, she was doing laundry when she suddenly felt lightheaded and like she was going to \"pass out.\"  Patient states she communicated via radio walk[employer] that she needed help and was assisted by her coworkers.  Denies loss of consciousness or head strike.  Denies any prodromal symptoms such as diaphoresis, nausea, or vomiting.  Does state that for the past week she had the flu and had poor p.o. intake.  Also endorsed a transient history of diarrhea mostly that occurred 3 days ago.  Denied any melena or gross bleeding from rectum.  Does endorse 1 episode of diarrhea earlier today but again denies any melena or mucus in stool.  Patient denies any personal history of cancer but does state that her mother was diagnosed with bladder cancer.  States that her father also had  \"renal failure.\"    Patient states she was on blood pressure medication for several years but self discontinued it as she \"no longer needed it.\"  Social history significant for a 60+ pack year history of tobacco use.    Review of Systems   Constitutional:  Negative for chills and fever.   HENT:  Negative for ear pain and sore throat.    Eyes:  Negative for pain and visual disturbance.   Respiratory:  Negative for cough and shortness of breath.    Cardiovascular:  Negative for chest pain and palpitations.   Gastrointestinal:  Positive for abdominal pain. Negative for vomiting.        Mild left lower quadrant pain on palpation "   Genitourinary:  Negative for dysuria, frequency and hematuria.   Musculoskeletal:  Positive for arthralgias. Negative for back pain.        Arthritis   Skin:  Negative for color change and rash.   Neurological:  Positive for dizziness, light-headedness and headaches. Negative for seizures and syncope.   All other systems reviewed and are negative.      Historical Information   Past Medical History:   Diagnosis Date    Disease of thyroid gland     Polymyalgia rheumatica (HCC)      Past Surgical History:   Procedure Laterality Date    CHOLECYSTECTOMY      FOOT SURGERY      GALLBLADDER SURGERY      SHOULDER ARTHROSCOPY Right      Social History     Tobacco Use    Smoking status: Every Day     Current packs/day: 0.50     Average packs/day: 0.5 packs/day for 53.0 years (26.5 ttl pk-yrs)     Types: Cigarettes     Passive exposure: Current    Smokeless tobacco: Never   Vaping Use    Vaping status: Never Used   Substance and Sexual Activity    Alcohol use: No    Drug use: No    Sexual activity: Not Currently     E-Cigarette/Vaping    E-Cigarette Use Never User      E-Cigarette/Vaping Substances       Social History:  Marital Status: Single   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  Prior to Admission medications    Medication Sig Start Date End Date Taking? Authorizing Provider   cholecalciferol (VITAMIN D3) 1,000 units tablet Take 1 tablet (1,000 Units total) by mouth daily 12/2/20   Petty Lim MD   levothyroxine 112 mcg tablet Take 1 tablet by mouth once daily 10/3/24   Venkatesh Lundy MD   predniSONE 20 mg tablet 3 tab po qd x 2 then 2 tab po qd x 2 then 1 tab po qd x 2 12/2/24   Venkatesh Lundy MD     Allergies   Allergen Reactions    Bee Venom     Naproxen Hives    Penicillins Hives     Vicodin  [Hydrocodone-Acetaminophen] Hives and Itching    Other Rash     Spider bite        Objective :  Temp:  [97.5 °F (36.4 °C)] 97.5 °F (36.4 °C)  HR:  [75-89] 78  BP: (142-182)/() 182/77  Resp:  [18] 18  SpO2:  [96 %-98 %] 98 %  O2 Device: None (Room air)    Physical Exam  Vitals and nursing note reviewed.   Constitutional:       General: She is not in acute distress.     Appearance: She is well-developed.   HENT:      Head: Normocephalic and atraumatic.   Eyes:      Conjunctiva/sclera: Conjunctivae normal.   Cardiovascular:      Rate and Rhythm: Normal rate and regular rhythm.      Heart sounds: No murmur heard.  Pulmonary:      Effort: Pulmonary effort is normal. No respiratory distress.      Comments: Decreased breath sounds bilaterally  Abdominal:      Palpations: Abdomen is soft.      Tenderness: There is abdominal tenderness.      Comments: Left lower quadrant tenderness   Musculoskeletal:         General: No swelling.      Cervical back: Neck supple.      Right lower leg: No edema.      Left lower leg: No edema.   Skin:     General: Skin is warm and dry.      Capillary Refill: Capillary refill takes less than 2 seconds.   Neurological:      General: No focal deficit present.      Mental Status: She is alert and oriented to person, place, and time.   Psychiatric:         Mood and Affect: Mood normal.          Lines/Drains:            Lab Results: I have reviewed the following results:  Results from last 7 days   Lab Units 01/22/25  1138   WBC Thousand/uL 6.76   HEMOGLOBIN g/dL 17.0*   HEMATOCRIT % 51.3*   PLATELETS Thousands/uL 191   SEGS PCT % 65   LYMPHO PCT % 21   MONO PCT % 12   EOS PCT % 1     Results from last 7 days   Lab Units 01/22/25  1138   SODIUM mmol/L 133*   POTASSIUM mmol/L 3.8   CHLORIDE mmol/L 97   CO2 mmol/L 26   BUN mg/dL 26*   CREATININE mg/dL 1.42*   ANION GAP mmol/L 10   CALCIUM mg/dL 9.2   ALBUMIN g/dL 4.4   TOTAL BILIRUBIN mg/dL 0.56   ALK PHOS U/L 67   ALT U/L 58*   AST U/L  51*   GLUCOSE RANDOM mg/dL 124             Lab Results   Component Value Date    HGBA1C 5.4 08/30/2017                 Administrative Statements       ** Please Note: This note has been constructed using a voice recognition system. **

## 2025-01-22 NOTE — ASSESSMENT & PLAN NOTE
Complaining of multiple episodes of diarrhea on Monday, 1/20/2025.  Denies any melena or gross rectal bleeding.  Denies presence of any mucus in stool  C. difficile and stool studies pending  Diffuse colonic wall thickening which may be due to colitis versus underdistention per CT AP  Mild LLQ abdominal pain on palpitation

## 2025-01-22 NOTE — ASSESSMENT & PLAN NOTE
- CTAP: Mild left cortical atrophy and delayed nephrogram. There is filling defects within the proximal and distal left renal artery resulting in high-grade to possibly near complete stenosis. Differential includes multifocal thrombus, age-indeterminate dissection versus noncalcified atheromatous plaqueincidental finding.  - incidental finding, likely chronic given no abdominal pain. Nontender  - recommend asa 81 mg, statin, smoking cessation  - f/u in office with vascular surgery    Rest of care per primary. Discussed with attending on call

## 2025-01-22 NOTE — ASSESSMENT & PLAN NOTE
History of primary hypertension  BP elevated today 188/72  Will likely need to be discharged on BP medication if persistently hypertensive  Consult placed to nephrology. Appreciate recommendations

## 2025-01-22 NOTE — ASSESSMENT & PLAN NOTE
Past medical history significant for polymyalgia rheumatica.  States she will occasionally take prednisone for her flares

## 2025-01-23 VITALS
SYSTOLIC BLOOD PRESSURE: 146 MMHG | TEMPERATURE: 97.3 F | RESPIRATION RATE: 18 BRPM | OXYGEN SATURATION: 97 % | BODY MASS INDEX: 29.41 KG/M2 | WEIGHT: 159.83 LBS | HEIGHT: 62 IN | DIASTOLIC BLOOD PRESSURE: 66 MMHG | HEART RATE: 76 BPM

## 2025-01-23 LAB
ALBUMIN SERPL BCG-MCNC: 3.6 G/DL (ref 3.5–5)
ALP SERPL-CCNC: 52 U/L (ref 34–104)
ALT SERPL W P-5'-P-CCNC: 46 U/L (ref 7–52)
ANION GAP SERPL CALCULATED.3IONS-SCNC: 6 MMOL/L (ref 4–13)
AST SERPL W P-5'-P-CCNC: 37 U/L (ref 13–39)
BILIRUB SERPL-MCNC: 0.49 MG/DL (ref 0.2–1)
BUN SERPL-MCNC: 27 MG/DL (ref 5–25)
CALCIUM SERPL-MCNC: 8.3 MG/DL (ref 8.4–10.2)
CHLORIDE SERPL-SCNC: 102 MMOL/L (ref 96–108)
CO2 SERPL-SCNC: 27 MMOL/L (ref 21–32)
CORTIS AM PEAK SERPL-MCNC: 15.3 UG/DL (ref 6.7–22.6)
CREAT SERPL-MCNC: 1.18 MG/DL (ref 0.6–1.3)
ERYTHROCYTE [DISTWIDTH] IN BLOOD BY AUTOMATED COUNT: 13.4 % (ref 11.6–15.1)
GFR SERPL CREATININE-BSD FRML MDRD: 45 ML/MIN/1.73SQ M
GLUCOSE SERPL-MCNC: 80 MG/DL (ref 65–140)
HCT VFR BLD AUTO: 43.5 % (ref 34.8–46.1)
HGB BLD-MCNC: 14.7 G/DL (ref 11.5–15.4)
MAGNESIUM SERPL-MCNC: 2.2 MG/DL (ref 1.9–2.7)
MCH RBC QN AUTO: 32.6 PG (ref 26.8–34.3)
MCHC RBC AUTO-ENTMCNC: 33.8 G/DL (ref 31.4–37.4)
MCV RBC AUTO: 97 FL (ref 82–98)
PLATELET # BLD AUTO: 167 THOUSANDS/UL (ref 149–390)
PMV BLD AUTO: 13.3 FL (ref 8.9–12.7)
POTASSIUM SERPL-SCNC: 3.6 MMOL/L (ref 3.5–5.3)
PROT SERPL-MCNC: 6.4 G/DL (ref 6.4–8.4)
RBC # BLD AUTO: 4.51 MILLION/UL (ref 3.81–5.12)
SODIUM SERPL-SCNC: 135 MMOL/L (ref 135–147)
WBC # BLD AUTO: 6.88 THOUSAND/UL (ref 4.31–10.16)

## 2025-01-23 PROCEDURE — 85027 COMPLETE CBC AUTOMATED: CPT

## 2025-01-23 PROCEDURE — 83735 ASSAY OF MAGNESIUM: CPT

## 2025-01-23 PROCEDURE — 82533 TOTAL CORTISOL: CPT

## 2025-01-23 PROCEDURE — 36415 COLL VENOUS BLD VENIPUNCTURE: CPT

## 2025-01-23 PROCEDURE — 84244 ASSAY OF RENIN: CPT

## 2025-01-23 PROCEDURE — 80053 COMPREHEN METABOLIC PANEL: CPT

## 2025-01-23 PROCEDURE — 82088 ASSAY OF ALDOSTERONE: CPT

## 2025-01-23 RX ORDER — ASPIRIN 81 MG/1
81 TABLET, CHEWABLE ORAL DAILY
Qty: 30 TABLET | Refills: 0 | Status: SHIPPED | OUTPATIENT
Start: 2025-01-23

## 2025-01-23 RX ORDER — ATORVASTATIN CALCIUM 40 MG/1
40 TABLET, FILM COATED ORAL
Qty: 30 TABLET | Refills: 0 | Status: SHIPPED | OUTPATIENT
Start: 2025-01-23

## 2025-01-23 RX ORDER — LEVOTHYROXINE SODIUM 112 UG/1
112 TABLET ORAL
Qty: 30 TABLET | Refills: 0 | Status: CANCELLED | OUTPATIENT
Start: 2025-01-24

## 2025-01-23 RX ORDER — POTASSIUM CHLORIDE 1500 MG/1
40 TABLET, EXTENDED RELEASE ORAL ONCE
Status: COMPLETED | OUTPATIENT
Start: 2025-01-23 | End: 2025-01-23

## 2025-01-23 RX ADMIN — POTASSIUM CHLORIDE 40 MEQ: 1500 TABLET, EXTENDED RELEASE ORAL at 08:44

## 2025-01-23 RX ADMIN — LEVOTHYROXINE SODIUM 112 MCG: 112 TABLET ORAL at 05:17

## 2025-01-23 RX ADMIN — ENOXAPARIN SODIUM 40 MG: 40 INJECTION SUBCUTANEOUS at 08:44

## 2025-01-23 RX ADMIN — NIFEDIPINE 60 MG: 60 TABLET, FILM COATED, EXTENDED RELEASE ORAL at 08:44

## 2025-01-23 NOTE — ASSESSMENT & PLAN NOTE
History of primary hypertension, patient has not been taking her medication for years  BP elevated today 188/72  Patient was discharged on nifedipine 60 mg  Consult placed to nephrology. Appreciate recommendations   Patient was counseled about the importance of blood pressure medications and regular monitoring

## 2025-01-23 NOTE — ASSESSMENT & PLAN NOTE
Wt Readings from Last 3 Encounters:   01/22/25 72.5 kg (159 lb 13.3 oz)   12/02/24 74.6 kg (164 lb 6.4 oz)   09/19/24 74.5 kg (164 lb 3.2 oz)   Most recent echo 2024 EF 60% with G1 DD  Does not appear volume overloaded on exam

## 2025-01-23 NOTE — DISCHARGE INSTR - AVS FIRST PAGE
Dear Alyse Gray,     It was our pleasure to care for you here at [facility].  It is our hope that we were always able to exceed the expected standards for your care during your stay.  You were hospitalized due to near syncope and hypertension.  You were cared for on the emergency department floor by Edward Locke MD under the service of Emilie Rothman MD with the [facility] who covers for your primary care physician (PCP), Venkatesh Lundy MD, while you were hospitalized.  If you have any questions or concerns related to this hospitalization, you may contact us at .  For follow up as well as any medication refills, we recommend that you follow up with your primary care physician.  A registered nurse will reach out to you by phone within a few days after your discharge to answer any additional questions that you may have after going home.  However, at this time we provide for you here, the most important instructions / recommendations at discharge:     Notable Medication Adjustments -   Please please please take your blood pressure medication, take nifedipine 60 mg once daily.  Take atorvastatin 40 mg once daily with dinner  Take aspirin 81 mg once daily  Continue taking levothyroxine 112 mcg early in the morning  Continue taking prednisone as prescribed  Testing Required after Discharge -   None  ** Please contact your PCP to request testing orders for any of the testing recommended here **  Important follow up information -   follow-up with your PCP within 1 week after discharge  Please follow-up with vascular surgery 2 weeks after discharge, please call and make an appointment information are provided in the discharge paper.  Please follow-up with nephrology within 2 weeks after discharge, please call and make an appointment  Other Instructions -   Blood pressure medication is very important even when you are blood  pressure looks normal you should continue taking those medications and follow-up with your doctors.  Please return if similar symptoms happen again.  Please return or go to the nearest urgent care for symptoms like headaches, dizziness, severe abdominal pain, or any other unusual symptoms.  Please review this entire after visit summary as additional general instructions including medication list, appointments, activity, diet, any pertinent wound care, and other additional recommendations from your care team that may be provided for you.      Sincerely,     Edward Locke MD

## 2025-01-23 NOTE — CASE MANAGEMENT
Case Management Progress Note    Patient name Alyse Gray  Location ED-11/ED-11 MRN [id number]  : 1950 Date 2025       LOS (days): 1  Geometric Mean LOS (GMLOS) (days):   Days to GMLOS:        OBJECTIVE:        Current admission status: Observation  Preferred Pharmacy:   [facility] [address] [place] [id number]  Phone: [phone] Fax: [phone]    Primary Care Provider: Venkatesh Lundy MD    Primary Insurance: MEDICARE  Secondary Insurance: BLUE CROSS    PROGRESS NOTE:  CM notified that pt will need Code 44 paperwork completed. CM met with patient at bedside, notified pt of change in admission status, and completed Code 44 paperwork. CM placed paperwork in bin to be scanned into chart.

## 2025-01-23 NOTE — ASSESSMENT & PLAN NOTE
Hemoglobin elevated at 17  Of note, patient does report poor p.o. intake over the past few days.  Patient has had multiple episodes of elevated hemoglobin between 15-16 within the past 2 years  Also has notable smoking history and possible sleep apnea     PLAN:  Consult placed to nephrology. Appreciate recommendations   Counseled on smoking cessation  Consider sleep study in outpatient setting   Statement Selected

## 2025-01-23 NOTE — ASSESSMENT & PLAN NOTE
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  Per patient, she was attempting to do laundry when she suddenly became very dizzy and felt as though she needed to sit down.  CT abdomen and pelvis significant for \"filling defects within the proximal and distal left renal artery resulting in high-grade to possibly near complete stenosis. Differential includes multifocal thrombus, age-indeterminate dissection versus noncalcified atheromatous plaque. Recommend vascular surgery consultation and dedicated CTA of the abdomen can be obtained for better characterization\"  Given recent CT contrast load and downtrending GFR, recommended no additional imaging at this time.  Will get CTA when GFR improves per vascular surgery    PLAN:  Serial BMP to monitor kidney function  Goal normotension  Placed consult to nephrology regarding recommendations for HTN, GERMÁN, and polycythemia. Appreciate recommendations.   Recommended nifedipine 60 mg daily.  Recommend holding on ACE/ARB due to kidney function past  IV labetalol 10 mg as needed for blood pressure 180/110  Avoid nephrotoxic medications as able.  Will hold off on additional contrast imaging at this time  Aspirin 81 mg and Lipitor 40 mg per vascular sx  Started on Lipitor 40 mg  Per chart review, patient has allergic reaction to aspirin listed as \"hives\" will confirm allergy.  Patient confirmed no allergy to aspirin  Vascular surgery on board.  Will follow-up outpatient  Ordered renin, aldosterone, and cortisol per nephrology recommendations  "

## 2025-01-23 NOTE — PHYSICIAN ADVISOR
Current patient class: Observation  The patient is currently on Hospital Day: 2 at [facility]        The patient was admitted to the hospital  on 1/22/25 at  4:49 PM for the following diagnosis:  Dizzy [R42]     After review of the relevant documentation, labs, vital signs and test results, the patient is most appropriate for OBSERVATION CLASS.     The patient was initially admitted as an inpatient. The patient was later determined to be more appropriate for observation class based on the diagnosis, care plan, and anticipated length of stay in the hospital.  The provider is in agreement with observation class and Code 44 conditions were met.    Rationale is as follows:    The patient is a 74 yrs   Female who presented to the ED at 1/22/2025 12:44 PM with a chief complaint of Syncope (Patient states has had the flu for the last few days and states went to work today but was moving things around started to feel very dizzy like she was going to fall /But did not fall ) Pt initially admitted IP status but within 2 hours changed back to OBS status. Pt was found to have L renal artery lesions which vascular surgery felt were chronic in nature. Pt was treated with IVF and electrolyte replacement and stable for discharge hospital day 2. OBS seems appropriate patient class.    The patient’s vitals on arrival were   ED Triage Vitals   Temperature Pulse Respirations Blood Pressure SpO2   01/22/25 1135 01/22/25 1135 01/22/25 1135 01/22/25 1135 01/22/25 1135   97.5 °F (36.4 °C) 89 18 (!) 178/118 97 %      Temp Source Heart Rate Source Patient Position - Orthostatic VS BP Location FiO2 (%)   01/22/25 2057 01/22/25 1300 01/22/25 1300 01/22/25 1300 --   Oral Monitor Lying Right arm       Pain Score       01/22/25 1135       8           Past Medical History:   Diagnosis Date    Disease of thyroid gland     Polymyalgia rheumatica (HCC)      Past Surgical History:   Procedure Laterality Date     "CHOLECYSTECTOMY      FOOT SURGERY      GALLBLADDER SURGERY      SHOULDER ARTHROSCOPY Right            Consults have been placed to:   IP CONSULT TO VASCULAR SURGERY    Vitals:    01/22/25 2057 01/22/25 2115 01/23/25 0130 01/23/25 0532   BP: (!) 217/88 160/68 128/58 146/66   BP Location: Right arm Right arm Right arm    Pulse: 79 71 67 76   Resp: 18 18 18 18   Temp: (!) 97.3 °F (36.3 °C)      TempSrc: Oral      SpO2: 96%  95% 97%   Weight: 72.5 kg (159 lb 13.3 oz)      Height: 5' 2\" (1.575 m)          Most recent labs:    Recent Labs     01/22/25  1138 01/22/25  1833 01/23/25  0526   WBC 6.76  --  6.88   HGB 17.0*  --  14.7   HCT 51.3*  --  43.5      < > 167   K 3.8  --  3.6   CALCIUM 9.2  --  8.3*   BUN 26*  --  27*   CREATININE 1.42*  --  1.18   LIPASE 28  --   --    AST 51*  --  37   ALT 58*  --  46   ALKPHOS 67  --  52    < > = values in this interval not displayed.       Scheduled Meds:  Current Facility-Administered Medications   Medication Dose Route Frequency Provider Last Rate    atorvastatin  40 mg Oral Daily With Dinner Anjana Winter, DO      calcium carbonate  1,000 mg Oral Daily PRN Anjana Winter, DO      enoxaparin  40 mg Subcutaneous Daily Anjana Winter, DO      labetalol  10 mg Intravenous Q4H PRN Anjana Winter, DO      levothyroxine  112 mcg Oral Early Morning Anjana Winter, DO      nicotine  1 patch Transdermal Daily Anjana Winter, DO      NIFEdipine  60 mg Oral Daily Anjana Winter, DO       Continuous Infusions:   PRN Meds:.  calcium carbonate    labetalol    Surgical procedures (if appropriate):        "

## 2025-01-23 NOTE — ASSESSMENT & PLAN NOTE
History of primary hypertension  BP elevated today 188/72  Will likely need to be discharged on BP medication if persistently hypertensive  Consult placed to nephrology. Appreciate recommendations    242.5

## 2025-01-23 NOTE — INCIDENTAL FINDINGS
The following findings require follow up:  Radiographic finding   Finding:   CT abdomen pelvis with contrast: -Mild left cortical atrophy and delayed nephrogram. There is filling defects within the proximal and distal left renal artery resulting in high-grade to possibly near complete stenosis. Differential includes multifocal thrombus, age-indeterminate , dissection versus noncalcified atheromatous plaque. Recommend vascular surgery consultation and dedicated CTA of the abdomen can be obtained for better characterization., -Circumferential bladder wall thickening and bilateral urothelial thickening and enhancement. Correlate with urinalysis for cystitis and ascending infection., -Diffuse colonic wall thickening which may be due to colitis versus underdistention. Clinical correlation advised., -Scattered areas of peripheral centrilobular nodules in the right lower lobe which may be due to aspiration or infectious/inflammatory bronchiolitis.      Follow up required: Schedule surgery   Follow up should be done within 2 week(s)    Please notify the following clinician to assist with the follow up:  -   -[facility]      Incidental finding results were discussed with the Patient by Edward Locke MD on 01/23/25.   They expressed understanding and all questions answered.

## 2025-01-23 NOTE — DISCHARGE SUMMARY
"Discharge Summary - Hospitalist   Name: Alyse Gray 74 y.o. 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  Per patient, she was attempting to do laundry when she suddenly became very dizzy and felt as though she needed to sit down.  CT abdomen and pelvis significant for \"filling defects within the proximal and distal left renal artery resulting in high-grade to possibly near complete stenosis. Differential includes multifocal thrombus, age-indeterminate dissection versus noncalcified atheromatous plaque. Recommend vascular surgery consultation and dedicated CTA of the abdomen can be obtained for better characterization\"  Given recent CT contrast load and downtrending GFR, recommended no additional imaging at this time.  Will get CTA when GFR improves per vascular surgery    PLAN:  Serial BMP to monitor kidney function  Goal normotension  Placed consult to nephrology regarding recommendations for HTN, GERMÁN, and polycythemia. Appreciate recommendations.   Recommended nifedipine 60 mg daily.  Recommend holding on ACE/ARB due to kidney function past  IV labetalol 10 mg as needed for blood pressure 180/110  Avoid nephrotoxic medications as able.  Will hold off on additional contrast imaging at this time  Aspirin 81 mg and Lipitor 40 mg per vascular sx  Started on Lipitor 40 mg  Per chart review, patient has allergic reaction to aspirin listed as \"hives\" will confirm allergy.  Patient confirmed no allergy to aspirin  Vascular surgery on board.  Will follow-up outpatient  Ordered renin, aldosterone, and cortisol per nephrology recommendations  Primary hypothyroidism  Continue levothyroxine 112 mcg " tablet daily  Most recent TSH 4 months ago wnl at 3.4  PMR (polymyalgia rheumatica) (HCC)  Past medical history significant for polymyalgia rheumatica.  States she will occasionally take prednisone for her flares  Tobacco abuse  60+ year history of tobacco abuse  Currently smokes about 10 cigarettes/day  Nicotine patch ordered  Counseled on smoking cessation  Polycythemia  Hemoglobin elevated at 17  Of note, patient does report poor p.o. intake over the past few days.  Patient has had multiple episodes of elevated hemoglobin between 15-16 within the past 2 years  Also has notable smoking history and possible sleep apnea     PLAN:  Consult placed to nephrology. Appreciate recommendations   Counseled on smoking cessation  Consider sleep study in outpatient setting  Primary hypertension  History of primary hypertension, patient has not been taking her medication for years  BP elevated today 188/72  Patient was discharged on nifedipine 60 mg  Consult placed to nephrology. Appreciate recommendations   Patient was counseled about the importance of blood pressure medications and regular monitoring  Transaminitis  Elevated liver enzymes noted on CMP  AST 51 ALT 58  Will continue to trend CMP  Prolonged Q-T interval on ECG  QTc elevated at 502 on EKG  Avoid QTc prolonging medications  Patient on telemetry  Diarrhea  Complaining of multiple episodes of diarrhea on Monday, 1/20/2025.  Denies any melena or gross rectal bleeding.  Denies presence of any mucus in stool  C. difficile and stool studies pending  Diffuse colonic wall thickening which may be due to colitis versus underdistention per CT AP  Mild LLQ abdominal pain on palpitation   Diastolic CHF (HCC)  Wt Readings from Last 3 Encounters:   01/22/25 72.5 kg (159 lb 13.3 oz)   12/02/24 74.6 kg (164 lb 6.4 oz)   09/19/24 74.5 kg (164 lb 3.2 oz)   Most recent echo 2024 EF 60% with G1 DD  Does not appear volume overloaded on exam     Medical Problems       Resolved Problems   Date Reviewed: 1/22/2025   None       Discharging Physician / Practitioner: Edward Locke MD  PCP: Venkatesh Lundy MD  Admission Date:   Admission Orders (From admission, onward)       Ordered        01/22/25 1826  Place in Observation  Once            01/22/25 1649  INPATIENT ADMISSION  Once,   Status:  Canceled                          Discharge Date: 01/23/25    Consultations During Hospital Stay:  Nephrology    Procedures Performed:   None    Significant Findings / Test Results:   CT abdomen and pelvic:  -Mild left cortical atrophy and delayed nephrogram. There is filling defects within the proximal and distal left renal artery resulting in high-grade to possibly near complete stenosis. Differential includes multifocal thrombus, age-indeterminate   dissection versus noncalcified atheromatous plaque. Recommend vascular surgery consultation and dedicated CTA of the abdomen can be obtained for better characterization.   -Circumferential bladder wall thickening and bilateral urothelial thickening and enhancement. Correlate with urinalysis for cystitis and ascending infection.   -Diffuse colonic wall thickening which may be due to colitis versus underdistention. Clinical correlation advised.   -Scattered areas of peripheral centrilobular nodules in the right lower lobe which may be due to aspiration or infectious/inflammatory bronchiolitis.    Hemoglobin 17  Creatinine 1.4  AST 51  ALT 58  Cortisol a.m. 15.3  Aldosterone/renin ratio pending    Incidental Findings:   CT abdomen and pelvic:  -Mild left cortical atrophy and delayed nephrogram. There is filling defects within the proximal and distal left renal artery resulting in high-grade to possibly near complete stenosis. Differential includes multifocal thrombus, age-indeterminate   dissection versus noncalcified atheromatous plaque. Recommend vascular surgery consultation and dedicated CTA of the abdomen can be obtained for better characterization.    -Circumferential bladder wall thickening and bilateral urothelial thickening and enhancement. Correlate with urinalysis for cystitis and ascending infection.   -Diffuse colonic wall thickening which may be due to colitis versus underdistention. Clinical correlation advised.   -Scattered areas of peripheral centrilobular nodules in the right lower lobe which may be due to aspiration or infectious/inflammatory bronchiolitis.        I reviewed the above mentioned incidental findings with the patient and/or family and they expressed understanding.    Test Results Pending at Discharge (will require follow up):   Aldosterone/renin     Outpatient Tests Requested:  none    Complications: None    Reason for Admission: Near syncope and hypertension    Hospital Course:   Alyse Gray is a 74 y.o. female patient who originally presented to the hospital on 1/22/2025 due to near syncope and hypertension.    Patient has a PMH of polymyalgia rheumatica, hypertension, hypothyroidism, tobacco use disorder who presented with near syncope/dizziness.  Patient was at work and was moving few boxes when she felt lightheaded and dizzy, she sat down and called her coworkers.  Patient denied losing consciousness or any head trauma, no previous similar episodes.  Patient was transferred to the ED by EMS.  On arrival her blood pressure was 170/118 that was responsive to albuterol 10 mg and nifedipine 60 mg.  Her labs showed hemoglobin of 17, slightly elevated LFTs, CR of 1.4.  Her UA and the rest of her labs were unremarkable.    Patient recalled flulike symptoms few days ago, LLQ pain and diarrhea, CT of the abdominal pelvis was ordered that showed left renal artery occlusion/stenosis.  Vascular surgery were consulted, they believe it is not an acute thrombosis possible likely due to age and trauma with VS atheromatous plaque.  No surgical intervention is recommended at the moment and they will follow-up with her in 2 weeks  "outpatient.  Nephrology were consulted, they recommended the patient to be discharged on blood pressure medications (nifedipine 60 mg) and follow-up outpatient.  Patient was counseled heavily about taking blood pressure medication, patient reports being diagnosed years ago with HTN however she has not been taking her medications for years.  Patient was advised to follow-up with PCP, vascular surgery, nephrology for further treatment management.  Patient was stable on discharge.  Patient was given return precautions.          Please see above list of diagnoses and related plan for additional information.     Condition at Discharge: fair    Discharge Day Visit / Exam:   Subjective:    Patient was examined at bedside.  No events reported overnight by nursing.  Patient was alert oriented x 3.  Patient did not have any complaints or concerns.  Patient reports having flulike symptoms last Thursday to have been improving.  She also recalls few episodes of diarrhea without blood.  Patient also reports left periumbilical and left lower quadrant abdominal pain.  Patient reports chronic back pain history due to disc herniation without radiculopathy signs.  Patient mentions chronic headaches episodes that she did not seek medical attention for.  With further questioning about her blood pressure, patient endorses being diagnosed with hypertension years ago but she did not take her medication she noticed her blood pressure was normal.    This a.m. patient denies headaches, fever, chills, cough, visual changes, chest pain, SOB, pain or burning while urination, diarrhea or constipation, left lower leg edema.    Vitals: Blood Pressure: 146/66 (01/23/25 0532)  Pulse: 76 (01/23/25 0532)  Temperature: (!) 97.3 °F (36.3 °C) (01/22/25 2057)  Temp Source: Oral (01/22/25 2057)  Respirations: 18 (01/23/25 0532)  Height: 5' 2\" (157.5 cm) (01/22/25 2057)  Weight - Scale: 72.5 kg (159 lb 13.3 oz) (01/22/25 2057)  SpO2: 97 % (01/23/25 " 0532)  Physical Exam  Vitals and nursing note reviewed.   Constitutional:       General: She is not in acute distress.     Appearance: Normal appearance. She is well-developed. She is not ill-appearing.   HENT:      Head: Normocephalic and atraumatic.   Eyes:      General: No scleral icterus.     Conjunctiva/sclera: Conjunctivae normal.   Cardiovascular:      Rate and Rhythm: Normal rate and regular rhythm.      Heart sounds: No murmur heard.  Pulmonary:      Effort: Pulmonary effort is normal. No respiratory distress.      Breath sounds: Normal breath sounds.   Abdominal:      Palpations: Abdomen is soft.      Tenderness: There is abdominal tenderness (LLQ and L periumbilical). There is no guarding.   Musculoskeletal:         General: No swelling.      Cervical back: Neck supple.      Right lower leg: No edema.      Left lower leg: No edema.   Skin:     General: Skin is warm and dry.      Capillary Refill: Capillary refill takes less than 2 seconds.      Findings: No lesion.   Neurological:      Mental Status: She is alert and oriented to person, place, and time.      Motor: No weakness.   Psychiatric:         Mood and Affect: Mood normal.         Behavior: Behavior normal.          Discussion with Family: Attempted to update  (son) via phone. Left voicemail.     Discharge instructions/Information to patient and family:   See after visit summary for information provided to patient and family.      Provisions for Follow-Up Care:  See after visit summary for information related to follow-up care and any pertinent home health orders.      Mobility at time of Discharge:   Basic Mobility Inpatient Raw Score: 24  JH-HLM Goal: 8: Walk 250 feet or more  JH-HLM Achieved: 8: Walk 250 feet ot more  HLM Goal achieved. Continue to encourage appropriate mobility.     Disposition:   Home    Planned Readmission: none    Discharge Medications:  See after visit summary for reconciled discharge medications provided to  patient and/or family.      Administrative Statements   Discharge Statement:    **Please Note: This note may have been constructed using a voice recognition system**

## 2025-01-24 ENCOUNTER — TRANSITIONAL CARE MANAGEMENT (OUTPATIENT)
Dept: FAMILY MEDICINE CLINIC | Facility: CLINIC | Age: 75
End: 2025-01-24

## 2025-01-24 ENCOUNTER — TELEPHONE (OUTPATIENT)
Age: 75
End: 2025-01-24

## 2025-01-24 NOTE — TELEPHONE ENCOUNTER
Patient called in to make an appointment. She said the doctor in the ED said she should see Nephrology within 2 weeks. The first available appointment I am seeing in [place] is 03/05. Patient did not want me to schedule that, wanted to see if we have anything sooner.    Please call patient back to advise.

## 2025-01-24 NOTE — TELEPHONE ENCOUNTER
Left msg with pt that scheduled 02/13 with Dr Reyes in the [place] office at 11:00 am.  Asked to call back to confirm that received message.

## 2025-01-24 NOTE — TELEPHONE ENCOUNTER
"Hello,    The following message was sent via e-mail to the leadership team:     Please advise if you can help facilitate the following overbook request:    Patient Name: Alyse Gray    Patient MRN: [id number]    Call back #: [phone]    Insurance: Medicare/[facility]    Department:Vascular    Speciality:     Reason for overbook request: PROVIDER REQUEST    Comments (Write \"N/a\" if no comments): Follow up to ED visit 1/22/25. Renal artery stenosis CTA 1/22/25. Advised to follow up with Vascular in 2 weeks per discharge summary.    Requested doctor and location: [facility]     Date of current appointment: 2/26/25      Thank you.    "

## 2025-01-27 ENCOUNTER — OFFICE VISIT (OUTPATIENT)
Dept: FAMILY MEDICINE CLINIC | Facility: CLINIC | Age: 75
End: 2025-01-27
Payer: MEDICARE

## 2025-01-27 VITALS
BODY MASS INDEX: 28.82 KG/M2 | OXYGEN SATURATION: 97 % | TEMPERATURE: 97.9 F | DIASTOLIC BLOOD PRESSURE: 74 MMHG | SYSTOLIC BLOOD PRESSURE: 126 MMHG | HEIGHT: 62 IN | HEART RATE: 90 BPM | WEIGHT: 156.6 LBS

## 2025-01-27 DIAGNOSIS — Z78.9 TRANSITION OF CARE: Primary | ICD-10-CM

## 2025-01-27 DIAGNOSIS — R55 NEAR SYNCOPE: ICD-10-CM

## 2025-01-27 DIAGNOSIS — R74.01 ELEVATED TRANSAMINASE LEVEL: ICD-10-CM

## 2025-01-27 DIAGNOSIS — R26.89 IMBALANCE: ICD-10-CM

## 2025-01-27 DIAGNOSIS — E86.0 DEHYDRATION: ICD-10-CM

## 2025-01-27 DIAGNOSIS — K52.9 COLITIS: ICD-10-CM

## 2025-01-27 DIAGNOSIS — I10 PRIMARY HYPERTENSION: ICD-10-CM

## 2025-01-27 DIAGNOSIS — R91.8 LUNG NODULES: ICD-10-CM

## 2025-01-27 PROCEDURE — 99496 TRANSJ CARE MGMT HIGH F2F 7D: CPT | Performed by: FAMILY MEDICINE

## 2025-01-27 NOTE — PROGRESS NOTES
"Transition of Care Visit  Name: Alyse Gray      : 1950      MRN: [id number]  Encounter Provider: Venkatesh Lundy MD  Encounter Date: 2025   Encounter department: [facility]    Assessment & Plan  Transition of care         Near syncope  Associated with imbalance and vertigo.  Patient also may have experienced elements of orthostasis associated with her dehydration.  Patient has improved with IV fluids, and states that she is taking food and fluid much better now.  She still has some mild vertigo/imbalance but it is much better.  Will continue to monitor.  Recheck at next scheduled visit       Imbalance  I reviewed with patient.  Patient still with mild imbalance.  No other focal neurologic findings on exam.  Discussed further workup including imaging of the brain-patient states that she does not want an MRI or CT at this point.  Patient understands risks of \"missing something\".  She also states that she does not have time for physical therapy at this point.  She wishes to monitor.  Follow-up 3 to 4 weeks if not resolved       Elevated transaminase level  Most likely related to patient's viral illness.  Resolved while in the hospital.  Continue to monitor.       Primary hypertension  New diagnosis.  Patient doing well on nifedipine 60 mg a day.  Monitor renal function.  Recheck 3 months       Colitis  Infectious, most likely viral, resulting in mild dehydration and hemoconcentration.  Resolved with IV fluids while inpatient.  No lightheadedness since though patient still has some vertigo.  Continue to monitor       Dehydration  Resolved with IV fluids.       Lung nodules  Radiology felt that these were infectious/inflammatory in nature.  Possibly related to recent flulike illness?  Patient without respiratory symptoms at present however does continue to smoke, and has a very long smoking history.  I discussed repeat CT of the chest-patient does not wish to have any " "scans at present.  Patient understands risks.  Recheck 6 weeks-earlier if respiratory symptoms should develop            History of Present Illness     Transitional Care Management Review:   Alyse Gray is a 74 y.o. female here for TCM follow up.     During the TCM phone call patient stated:  TCM Call     Date and time call was made  1/24/2025  1:15 PM    Hospital care reviewed  Records reviewed    Patient was hospitialized at  [facility]    Date of Admission  01/22/25    Date of discharge  01/23/25    Diagnosis  Renal artery stenosis    Disposition  Home    Were the patients medications reviewed and updated  Yes    Current Symptoms  None      TCM Call     Post hospital issues  None    Should patient be enrolled in anticoag monitoring?  No    Scheduled for follow up?  Yes    Did you obtain your prescribed medications  Yes    Do you need help managing your prescriptions or medications  No    Is transportation to your appointment needed  No    I have advised the patient to call PCP with any new or worsening symptoms  Jane Stephen MA        Pt here for Tcm  -73 yo female with hx of HTN, tobacco abuse, PMR and diastolic dysfunction was at work on day of admission when she developed acute imbalance and lightheadedness.  Imbalance involved some \"world spinning\".  Pt did note that she felt like she could pass out and called for help.  Pt was taken to [place] ED where she was found to be hypertensive.  Labs revealed sl elevated creat compared to her baseline, along with slightly elevated transaminase levels, and elevated hemoglobin consistent with hemoconcentration.  Patient admits that she has had flulike symptoms with abdominal discomfort and diarrhea for the last 3 to 5 days and that oral intake may have decreased during this time.  CT of the abdomen/pelvis showed possible left renal artery occlusion, as well as bladder and diffuse colon wall thickening that may be related to cystitis and colitis " respectively.  There is also some scattered peripheral centrilobular nodules in the right lower lobe possibly infectious or inflammatory in nature.  Patient was subsequently admitted and started on IV fluids.  UA was not suggestive of UTI.  Repeat labs done on 1/23 showed resolution of her elevated transaminase levels, and return of her hemoglobin and creatinine to baseline levels.  Vascular surgery was consulted regarding her left renal artery stenosis and they recommended maximal medical therapy with statins, aspirin and blood pressure control.  Nephrology was also consulted who started patient on nifedipine 60 mg a day for elevated blood pressure.  Patient felt better by 1/23 and was able to be discharged to home.  Patient here for transition of care visit  - since discharge, patient has experienced a persistent L temple area HA.  She still gets some mild/brief episodes of vertigo.  She states that she is compliant with her medications.  She denies any abdominal discomfort, persistent flulike symptoms, cough or worsening shortness of breath.  She is still smoking 10cig/day.   - I reviewed available hospital records, lab results and study reports with pt      Review of Systems   Constitutional:  Positive for fatigue (improving). Negative for activity change, appetite change and chills.   HENT: Negative.     Eyes: Negative.    Respiratory:  Positive for apnea. Negative for cough and shortness of breath.    Cardiovascular: Negative.    Gastrointestinal: Negative.  Negative for diarrhea (resolved).   Genitourinary: Negative.    Musculoskeletal:  Positive for arthralgias (chronic), back pain (intermittent) and myalgias.   Skin: Negative.    Neurological:  Positive for dizziness ((+) mild, intermittent). Negative for weakness, light-headedness (resolved), numbness and headaches.     Objective   /74 (BP Location: Right arm, Patient Position: Sitting, Cuff Size: Standard)   Pulse 90   Temp 97.9 °F (36.6 °C)  "(Temporal)   Ht 5' 2\" (1.575 m)   Wt 71 kg (156 lb 9.6 oz)   SpO2 97%   BMI 28.64 kg/m²     Physical Exam  Vitals reviewed.   HENT:      Head: Normocephalic.      Comments: L temple NT to palp     Right Ear: Tympanic membrane, ear canal and external ear normal.      Left Ear: Tympanic membrane, ear canal and external ear normal.      Nose: Nose normal.      Mouth/Throat:      Mouth: Mucous membranes are moist.   Eyes:      Extraocular Movements: Extraocular movements intact.      Conjunctiva/sclera: Conjunctivae normal.      Pupils: Pupils are equal, round, and reactive to light.   Cardiovascular:      Rate and Rhythm: Normal rate and regular rhythm.      Pulses: Normal pulses.   Pulmonary:      Effort: Pulmonary effort is normal.   Abdominal:      General: There is no distension.      Palpations: There is no mass.      Tenderness: There is no abdominal tenderness.   Musculoskeletal:         General: Deformity (mild diffuse arthritic changes) present. No swelling or tenderness.      Cervical back: No tenderness.      Right lower leg: No edema.      Left lower leg: No edema.   Lymphadenopathy:      Cervical: No cervical adenopathy.   Skin:     General: Skin is warm.      Capillary Refill: Capillary refill takes less than 2 seconds.   Neurological:      Mental Status: She is alert.      Cranial Nerves: No cranial nerve deficit.      Sensory: No sensory deficit.      Motor: No weakness.      Coordination: Coordination abnormal (mild imbalance with Romberg).      Gait: Gait normal.      Deep Tendon Reflexes: Reflexes normal (symmetrical).       Medications have been reviewed by provider in current encounter      "

## 2025-01-28 LAB
ALDOST SERPL-MCNC: 17.2 NG/DL (ref 0–30)
ALDOST/RENIN PLAS-RTO: 4.5 {RATIO} (ref 0–30)
RENIN PLAS-CCNC: 3.81 NG/ML/HR (ref 0.17–5.38)

## 2025-01-30 NOTE — ASSESSMENT & PLAN NOTE
New diagnosis.  Patient doing well on nifedipine 60 mg a day.  Monitor renal function.  Recheck 3 months

## 2025-02-13 ENCOUNTER — CONSULT (OUTPATIENT)
Dept: NEPHROLOGY | Facility: CLINIC | Age: 75
End: 2025-02-13
Payer: MEDICARE

## 2025-02-13 VITALS
WEIGHT: 158 LBS | SYSTOLIC BLOOD PRESSURE: 140 MMHG | BODY MASS INDEX: 28.9 KG/M2 | HEART RATE: 85 BPM | OXYGEN SATURATION: 97 % | DIASTOLIC BLOOD PRESSURE: 70 MMHG

## 2025-02-13 DIAGNOSIS — M35.3 PMR (POLYMYALGIA RHEUMATICA) (HCC): ICD-10-CM

## 2025-02-13 DIAGNOSIS — E83.9 CHRONIC KIDNEY DISEASE-MINERAL BONE DISORDER (CKD-MBD) WITH STAGE 3B CHRONIC KIDNEY DISEASE (HCC): ICD-10-CM

## 2025-02-13 DIAGNOSIS — N18.32 CHRONIC KIDNEY DISEASE-MINERAL BONE DISORDER (CKD-MBD) WITH STAGE 3B CHRONIC KIDNEY DISEASE (HCC): ICD-10-CM

## 2025-02-13 DIAGNOSIS — M89.9 CHRONIC KIDNEY DISEASE-MINERAL BONE DISORDER (CKD-MBD) WITH STAGE 3B CHRONIC KIDNEY DISEASE (HCC): ICD-10-CM

## 2025-02-13 DIAGNOSIS — N18.32 STAGE 3B CHRONIC KIDNEY DISEASE (HCC): ICD-10-CM

## 2025-02-13 DIAGNOSIS — I70.1 RAS (RENAL ARTERY STENOSIS) (HCC): Primary | ICD-10-CM

## 2025-02-13 DIAGNOSIS — I15.0 RENOVASCULAR HYPERTENSION: ICD-10-CM

## 2025-02-13 PROCEDURE — G2211 COMPLEX E/M VISIT ADD ON: HCPCS | Performed by: STUDENT IN AN ORGANIZED HEALTH CARE EDUCATION/TRAINING PROGRAM

## 2025-02-13 PROCEDURE — 99214 OFFICE O/P EST MOD 30 MIN: CPT | Performed by: STUDENT IN AN ORGANIZED HEALTH CARE EDUCATION/TRAINING PROGRAM

## 2025-02-13 RX ORDER — NIFEDIPINE 90 MG/1
90 TABLET, EXTENDED RELEASE ORAL DAILY
Qty: 90 TABLET | Refills: 1 | Status: SHIPPED | OUTPATIENT
Start: 2025-02-13

## 2025-02-13 NOTE — ASSESSMENT & PLAN NOTE
Left renal artery stenosis on recent CT with contrast  secondary atrophic kidney and CKD  Patient will see vascular surgery for further evaluation.  Kidney function is stable with BP well-controlled.  I anticipate that she will not require revascularization at this time   Orders:    Ambulatory Referral to Nephrology

## 2025-02-13 NOTE — ASSESSMENT & PLAN NOTE
Lab Results   Component Value Date    EGFR 45 01/23/2025    EGFR 36 01/22/2025    EGFR 44 06/10/2024    CREATININE 1.18 01/23/2025    CREATININE 1.42 (H) 01/22/2025    CREATININE 1.23 09/19/2024   Baseline creatinine: 1.3 to 1.5 mg/dL  Current creatinine: 1.5 mg/dL, baseline  Etiology: Likely secondary to renovascular disease in the settings of renal artery stenosis and atrophic kidney  No hematuria, no leukocyturia  Imaging: Left atrophic kidney, no hydronephrosis  Plan:  No indication of dialysis at this time  Avoid NSAIDs  Follow-up with BMP, UACRr  Orders:    Basic metabolic panel; Future    PTH, intact; Future    Urinalysis with microscopic; Future    Albumin / creatinine urine ratio; Future    NIFEdipine (PROCARDIA XL) 90 mg 24 hr tablet; Take 1 tablet (90 mg total) by mouth daily     detailed exam

## 2025-02-13 NOTE — ASSESSMENT & PLAN NOTE
Lab Results   Component Value Date    EGFR 45 01/23/2025    EGFR 36 01/22/2025    EGFR 44 06/10/2024    CREATININE 1.18 01/23/2025    CREATININE 1.42 (H) 01/22/2025    CREATININE 1.23 09/19/2024     Calcium 8.3 mg/dL  No indication of binders at this time

## 2025-02-13 NOTE — PATIENT INSTRUCTIONS
Thank you for coming to your visit today. As we discussed you kidney function is slightly abnormal likely secondary to hypertension and left  artery stenosis, your creatinine is 1.5 mg/dL.  Your electrolytes are normal. Please follow the recommendations below       Recommend low sodium (salt) food    Avoid nonsteroidal anti-inflammatory drugs such as Naprosyn, ibuprofen, Aleve, Advil, Celebrex, Meloxicam (Mobic) etc.  You can use Tylenol as needed if you do not have any liver condition to be concerned about    Try to avoid medications such as pantoprazole or  Protonix/Nexium or Esomeprazole)/Prilosec or omeprazole/Prevacid or lansoprazole/AcipHex or Rabeprazole.  If you are able to, use Pepcid as this is safer for your kidneys.    Try to exercise at least 30 minutes 3 days a week to begin with with an ultimate goal of 5 days a week for at least 30 minutes  Increase nifedipine to 90mg once a day      Blood Pressure Measurement Instructions:  Take the morning readings before any medications and when yo are relaxed for several minutes   Take the evening readings between 7pm and 10pm at least 30 minutes before or after dinner/eating/coffee or alcohol intake and certainly before any blood pressure medications,   Please include heart rate with your blood pressure readings   When taking standing readings, keep your arm supported at heart level and not dangling  Make sure you are sitting with your back supported and feet on the ground and do not cross your legs or feet  Make sure you have not taken any coffee or caffeine products or exercised or smoke cigarettes at least 30 minutes before taking your blood pressure        Next Visit in 4-5 months with results   If you need to see us earlier we can change the appointment for you      Joselyn Reyes Bahamonde, MD  Nephrology Attending

## 2025-02-13 NOTE — ASSESSMENT & PLAN NOTE
Volume: Euvolemic on exam  Blood pressure: Blood pressure 140/70, borderline hypertension  Secondary to left GERMÁN  Low sodium diet   Continue with low-sodium diet  Increase nifedipine to 90 mg daily, Procardia   Orders:    Ambulatory Referral to Nephrology

## 2025-02-13 NOTE — PROGRESS NOTES
Name: Alyse Gray      : 1950      MRN: [id number]  Encounter Provider: Joselyn Reyes Bahamonde, MD  Encounter Date: 2025   [facility]  :  Assessment & Plan  Renovascular hypertension  Volume: Euvolemic on exam  Blood pressure: Blood pressure 140/70, borderline hypertension  Secondary to left GERMÁN  Low sodium diet   Continue with low-sodium diet  Increase nifedipine to 90 mg daily, Procardia   Orders:    Ambulatory Referral to Nephrology    GERMÁN (renal artery stenosis) ([facility])  Left renal artery stenosis on recent CT with contrast  secondary atrophic kidney and CKD  Patient will see vascular surgery for further evaluation.  Kidney function is stable with BP well-controlled.  I anticipate that she will not require revascularization at this time   Orders:    Ambulatory Referral to Nephrology    Stage 3b chronic kidney disease ([facility])  Lab Results   Component Value Date    EGFR 45 2025    EGFR 36 2025    EGFR 44 06/10/2024    CREATININE 1.18 2025    CREATININE 1.42 (H) 2025    CREATININE 1.23 2024   Baseline creatinine: 1.3 to 1.5 mg/dL  Current creatinine: 1.5 mg/dL, baseline  Etiology: Likely secondary to renovascular disease in the settings of renal artery stenosis and atrophic kidney  No hematuria, no leukocyturia  Imaging: Left atrophic kidney, no hydronephrosis  Plan:  No indication of dialysis at this time  Avoid NSAIDs  Follow-up with BMP, UACRr  Orders:    Basic metabolic panel; Future    PTH, intact; Future    Urinalysis with microscopic; Future    Albumin / creatinine urine ratio; Future    NIFEdipine (PROCARDIA XL) 90 mg 24 hr tablet; Take 1 tablet (90 mg total) by mouth daily    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 female hypertension, history of lung nodules, PMR, left atrophic kidney, left GERMÁN who presents initial evaluation of secondary hypertension  History obtained from: patient    Review of Systems   Constitutional:  Negative for activity change and appetite change.   HENT:  Negative for congestion and dental problem.    Eyes:  Negative for discharge.   Respiratory:  Negative for cough.    Cardiovascular:  Negative for chest pain and leg swelling.   Gastrointestinal:  Negative for abdominal distention and abdominal pain.   Endocrine: Negative for cold intolerance.   Genitourinary:  Negative for dysuria.   Musculoskeletal:  Negative for arthralgias.   Skin:  Negative for color change and pallor.   Neurological:  Negative for dizziness.   Psychiatric/Behavioral:  Negative for agitation.      Pertinent Medical History   Patient is here for initial evaluation of hypertension  Medical History Reviewed by provider this encounter:     .  Past Medical History   Past Medical History:   Diagnosis Date    Disease of thyroid gland     Polymyalgia rheumatica (HCC)      Past Surgical History:   Procedure Laterality Date    CHOLECYSTECTOMY      FOOT SURGERY      GALLBLADDER SURGERY      SHOULDER ARTHROSCOPY Right      Family History   Problem Relation Age of Onset    Heart disease Mother     Diabetes Mother     Hypertension Mother     Breast cancer Sister     Colon cancer Brother     Heart disease Brother     Diabetes Brother     Hypertension Brother       reports that she has been smoking cigarettes. She has a 26.5 pack-year smoking history. She has been exposed to tobacco smoke. She has never used smokeless tobacco. She reports that she does not drink alcohol and does not use  drugs.  Current Outpatient Medications on File Prior to Visit   Medication Sig Dispense Refill    aspirin 81 mg chewable tablet Chew 1 tablet (81 mg total) daily 30 tablet 0    atorvastatin (LIPITOR) 40 mg tablet Take 1 tablet (40 mg total) by mouth daily with dinner 30 tablet 0    cholecalciferol (VITAMIN D3) 1,000 units tablet Take 1 tablet (1,000 Units total) by mouth daily 90 tablet 3    levothyroxine 112 mcg tablet Take 1 tablet by mouth once daily 90 tablet 1    NIFEdipine (ADALAT CC) 60 MG 24 hr tablet Take 1 tablet (60 mg total) by mouth daily 30 tablet 0    predniSONE 20 mg tablet 3 tab po qd x 2 then 2 tab po qd x 2 then 1 tab po qd x 2 (Patient not taking: Reported on 1/27/2025) 12 tablet 0     No current facility-administered medications on file prior to visit.     Allergies   Allergen Reactions    Bee Venom     Naproxen Hives    Penicillins Hives    Vicodin  [Hydrocodone-Acetaminophen] Hives and Itching    Other Rash     Spider bite       Current Outpatient Medications on File Prior to Visit   Medication Sig Dispense Refill    aspirin 81 mg chewable tablet Chew 1 tablet (81 mg total) daily 30 tablet 0    atorvastatin (LIPITOR) 40 mg tablet Take 1 tablet (40 mg total) by mouth daily with dinner 30 tablet 0    cholecalciferol (VITAMIN D3) 1,000 units tablet Take 1 tablet (1,000 Units total) by mouth daily 90 tablet 3    levothyroxine 112 mcg tablet Take 1 tablet by mouth once daily 90 tablet 1    NIFEdipine (ADALAT CC) 60 MG 24 hr tablet Take 1 tablet (60 mg total) by mouth daily 30 tablet 0    predniSONE 20 mg tablet 3 tab po qd x 2 then 2 tab po qd x 2 then 1 tab po qd x 2 (Patient not taking: Reported on 1/27/2025) 12 tablet 0     No current facility-administered medications on file prior to visit.      Social History     Tobacco Use    Smoking status: Every Day     Current packs/day: 0.50     Average packs/day: 0.5 packs/day for 53.0 years (26.5 ttl pk-yrs)     Types: Cigarettes     Passive exposure:  Current    Smokeless tobacco: Never   Vaping Use    Vaping status: Never Used   Substance and Sexual Activity    Alcohol use: No    Drug use: No    Sexual activity: Not Currently        Objective   There were no vitals taken for this visit.     Physical Exam  General:  no acute distress at this time  Skin:  No acute rash  Eyes:  No scleral icterus and noninjected  ENT:  mucous membranes moist  Neck:  no carotid bruits  Chest:  Clear to auscultation percussion, good respiratory effort, no use of accessory respiratory muscles  CVS:  Regular rate and rhythm without rub   Abdomen:  soft and nontender   Extremities: no significant lower extremity edema  Neuro:  No gross focality  Psych:  Alert , cooperative

## 2025-02-18 DIAGNOSIS — I70.1 RENAL ARTERY STENOSIS (HCC): ICD-10-CM

## 2025-02-19 RX ORDER — ATORVASTATIN CALCIUM 40 MG/1
40 TABLET, FILM COATED ORAL
Qty: 30 TABLET | Refills: 0 | Status: SHIPPED | OUTPATIENT
Start: 2025-02-19

## 2025-02-19 RX ORDER — ASPIRIN 81 MG/1
81 TABLET, CHEWABLE ORAL DAILY
Qty: 30 TABLET | Refills: 0 | Status: SHIPPED | OUTPATIENT
Start: 2025-02-19

## 2025-02-20 ENCOUNTER — TELEPHONE (OUTPATIENT)
Age: 75
End: 2025-02-20

## 2025-02-20 NOTE — TELEPHONE ENCOUNTER
PA for ASPIRIN 81 CHEWABLE SUBMITTED to [employer]    via      [x][employer]-Case ID #     [x]PA sent as URGENT    All office notes, labs and other pertaining documents and studies sent. Clinical questions answered. Awaiting determination from insurance company.     Turnaround time for your insurance to make a decision on your Prior Authorization can take 7-21 business days.

## 2025-02-21 NOTE — TELEPHONE ENCOUNTER
PA for ASPIRIN 81 CHEWABLE DENIED    Reason:(Screenshot if applicable)  NOT COVERED UNDER PLAN BENEFIT- OTC MEDICATION        Message sent to office clinical pool Yes    Denial letter scanned into Media Yes    Appeal started No (Provider will need to decide if appeal is warranted and send clinical documentation to Prior Authorization Team for initiation.)    **Please follow up with your patient regarding denial and next steps**

## 2025-02-26 ENCOUNTER — CONSULT (OUTPATIENT)
Dept: VASCULAR SURGERY | Facility: CLINIC | Age: 75
End: 2025-02-26
Payer: MEDICARE

## 2025-02-26 VITALS
HEART RATE: 73 BPM | TEMPERATURE: 97.7 F | OXYGEN SATURATION: 97 % | DIASTOLIC BLOOD PRESSURE: 80 MMHG | SYSTOLIC BLOOD PRESSURE: 148 MMHG

## 2025-02-26 DIAGNOSIS — Z72.0 TOBACCO ABUSE: ICD-10-CM

## 2025-02-26 DIAGNOSIS — I70.1 RENAL ARTERY STENOSIS (HCC): ICD-10-CM

## 2025-02-26 DIAGNOSIS — I15.0 RENOVASCULAR HYPERTENSION: Primary | ICD-10-CM

## 2025-02-26 DIAGNOSIS — E78.00 PURE HYPERCHOLESTEROLEMIA: ICD-10-CM

## 2025-02-26 DIAGNOSIS — N18.32 STAGE 3B CHRONIC KIDNEY DISEASE (HCC): ICD-10-CM

## 2025-02-26 DIAGNOSIS — I70.1 RAS (RENAL ARTERY STENOSIS) (HCC): ICD-10-CM

## 2025-02-26 PROCEDURE — 99203 OFFICE O/P NEW LOW 30 MIN: CPT | Performed by: SURGERY

## 2025-02-26 NOTE — PATIENT INSTRUCTIONS
Take daily atorvastatin 40mg every night (cholesterol pill)    Pls work on quitting cigarette     Monitor BP daily    If there are issues with  controlling BP or kidney function is worsening then we may have to consider left kidney artery stent procedure.

## 2025-02-26 NOTE — ASSESSMENT & PLAN NOTE
LDL was 124 last year.  Significant left renal atherosclerotic plaque, she will benefit from taking daily atorvastatin 40mg every night.  LDL goal should be <80.

## 2025-02-26 NOTE — PROGRESS NOTES
Name: Alyse Gray      : 1950      MRN: [id number]  Encounter Provider: Jonathan Diaz MD  Encounter Date: 2025   [facility]  :  Assessment & Plan  Renal artery stenosis (HCC)    Orders:  •  Ambulatory Referral to Vascular Surgery    Renovascular hypertension  Related to left renal artery high grade stenosis.    First episode was in May 2024 when she had presented with accelerated hypertension.  Recent episode was also related to high BP.  She has been on nifedipine which was recently increased from 45 to 90.    No leg swelling or shortness of breath.  Overall BP is well managed at home.       Stage 3b chronic kidney disease (HCC)  Lab Results   Component Value Date    EGFR 45 2025    EGFR 36 2025    EGFR 44 06/10/2024    CREATININE 1.18 2025    CREATININE 1.42 (H) 2025    CREATININE 1.23 2024    She is back to baseline.  Continue to monitor closely with nephrology.  Left kideny is shrinking but size is still 8 cm.    Her disease is ongoing for at least 1 year since I can see a change in creatinine starting may 2024.       GERMÁN (renal artery stenosis) (HCC)  I have personally reviewed the CT imaging and my independent interpretation is as follows:  CT with IV contrast done in  shows high-grade proximal artery stenosis (about 1 cm past its origin) with irregular plaque, possible in situ thrombus, left kidney has hypoenhancement.  Unclear of the chronicity of the condition.  Patient had presented with nonspecific symptoms of lightheadedness, near syncope.  This could be an event where there was a rupture of an acute plaque or some changes in plaque morphology but events of accelearted HTN ongoing since May 2024 so likely that lesion is present since then.    No flank pain to suggest any acute left kidney infarct.      Risk factors include hypertension , hyperlipidemia and history of cigarette smoking.    Patient  was also very hypertensive on ER admission.      At this point I recommend that we continue with daily aspirin and atorvastatin.  We can consider left renal arteriogram with intervention to salvage the left kidney, will discuss with our interventional radiology colleagues to see if this will be feasible.  We discussed vascular intervention vs. Continued medical management and risk and benefits for each approach. But patient at this point does not want invasive intervention.  Her kidney function has remained stable over last year so we can continue to watch.      If there are issues with  controlling BP or kidney function is worsening then we may have to consider left kidney artery stent procedure.         Tobacco abuse  Tobacco use is a significant patient-modifiable risk factor for this patient’s vascular disease with multiple vascular comorbidities, and a significant risk factor for failure of and complications from any endovascular or surgical interventions.    I explained to the patient the effects of smoking including peripheral artery disease, coronary artery disease, cerebrovascular disease as well as cancer and chronic obstructive pulmonary disease. I asked the patient to stop smoking immediately. It is never too late to quit, and many studies show significant health benefits as well as economical savings after smoking cessation.    Based on our conversation, this patient does not appear ready to quit    And declined my offer of nicotine replacement or tobacco cessation medications    The patient did not set a quit date. I will continue to  follow up on this issue at our next scheduled visit.     I spent approximately 5 minutes on tobacco cessation counseling with this patient.             Pure hypercholesterolemia  LDL was 124 last year.  Significant left renal atherosclerotic plaque, she will benefit from taking daily atorvastatin 40mg every night.  LDL goal should be <80.           History of Present  Illness   HPI  Alyse Gray is a 75 y.o. female who presents for follow up     Patient is here for a Consult - Renal artery stenosis - CTA 1/22/25. 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    History obtained from: patient    Review of Systems  Past Medical History   Past Medical History:   Diagnosis Date   • Disease of thyroid gland    • Polymyalgia rheumatica (HCC)      Past Surgical History:   Procedure Laterality Date   • CHOLECYSTECTOMY     • FOOT SURGERY     • GALLBLADDER SURGERY     • SHOULDER ARTHROSCOPY Right      Family History   Problem Relation Age of Onset   • Heart disease Mother    • Diabetes Mother    • Hypertension Mother    • Breast cancer Sister    • Colon cancer Brother    • Heart disease Brother    • Diabetes Brother    • Hypertension Brother       reports that she has been smoking cigarettes. She has a 26.5 pack-year smoking history. She has been exposed to tobacco smoke. She has never used smokeless tobacco. She reports that she does not drink alcohol and does not use drugs.  Current Outpatient Medications   Medication Instructions   • aspirin 81 mg, Oral, Daily   • atorvastatin (LIPITOR) 40 mg, Oral, Daily with dinner   • cholecalciferol (VITAMIN D3) 1,000 Units, Oral, Daily   • levothyroxine 112 mcg, Oral, Daily   • NIFEdipine (PROCARDIA XL) 90 mg, Oral, Daily     Allergies   Allergen Reactions   • Bee Venom    • Naproxen Hives   • Penicillins Hives   • Vicodin  [Hydrocodone-Acetaminophen] Hives and Itching   • Other Rash     Spider bite          Objective   /80 (BP Location: Right arm, Patient Position: Sitting, Cuff Size: Adult)   Pulse 73   Temp 97.7 °F (36.5 °C) (Temporal)   SpO2 97%      Physical Exam  Vitals and nursing note reviewed.   Constitutional:       Appearance: Normal appearance.   HENT:      Head: Normocephalic and atraumatic.   Cardiovascular:      Rate and Rhythm:  Normal rate and regular rhythm.      Pulses:           Radial pulses are 2+ on the right side and 0 on the left side.   Abdominal:      General: There is no distension.      Palpations: Abdomen is soft. There is no mass.      Tenderness: There is no abdominal tenderness. There is no right CVA tenderness, left CVA tenderness, guarding or rebound.      Hernia: No hernia is present.   Musculoskeletal:      Right lower leg: No edema.      Left lower leg: No edema.   Skin:     General: Skin is warm and dry.      Capillary Refill: Capillary refill takes less than 2 seconds.   Neurological:      General: No focal deficit present.      Mental Status: She is alert and oriented to person, place, and time.   Psychiatric:         Mood and Affect: Mood normal.         Behavior: Behavior normal.

## 2025-02-26 NOTE — ASSESSMENT & PLAN NOTE
Lab Results   Component Value Date    EGFR 45 01/23/2025    EGFR 36 01/22/2025    EGFR 44 06/10/2024    CREATININE 1.18 01/23/2025    CREATININE 1.42 (H) 01/22/2025    CREATININE 1.23 09/19/2024    She is back to baseline.  Continue to monitor closely with nephrology.  Left micky is shrinking but size is still 8 cm.    Her disease is ongoing for at least 1 year since I can see a change in creatinine starting may 2024.

## 2025-02-26 NOTE — ASSESSMENT & PLAN NOTE
I have personally reviewed the CT imaging and my independent interpretation is as follows:  CT with IV contrast done in January 22 shows high-grade proximal artery stenosis (about 1 cm past its origin) with irregular plaque, possible in situ thrombus, left kidney has hypoenhancement.  Unclear of the chronicity of the condition.  Patient had presented with nonspecific symptoms of lightheadedness, near syncope.  This could be an event where there was a rupture of an acute plaque or some changes in plaque morphology but events of accelearted HTN ongoing since May 2024 so likely that lesion is present since then.    No flank pain to suggest any acute left kidney infarct.      Risk factors include hypertension , hyperlipidemia and history of cigarette smoking.    Patient was also very hypertensive on ER admission.      At this point I recommend that we continue with daily aspirin and atorvastatin.  We can consider left renal arteriogram with intervention to salvage the left kidney, will discuss with our interventional radiology colleagues to see if this will be feasible.  We discussed vascular intervention vs. Continued medical management and risk and benefits for each approach. But patient at this point does not want invasive intervention.  Her kidney function has remained stable over last year so we can continue to watch.      If there are issues with  controlling BP or kidney function is worsening then we may have to consider left kidney artery stent procedure.

## 2025-02-26 NOTE — ASSESSMENT & PLAN NOTE
Tobacco use is a significant patient-modifiable risk factor for this patient’s vascular disease with multiple vascular comorbidities, and a significant risk factor for failure of and complications from any endovascular or surgical interventions.    I explained to the patient the effects of smoking including peripheral artery disease, coronary artery disease, cerebrovascular disease as well as cancer and chronic obstructive pulmonary disease. I asked the patient to stop smoking immediately. It is never too late to quit, and many studies show significant health benefits as well as economical savings after smoking cessation.    Based on our conversation, this patient does not appear ready to quit    And declined my offer of nicotine replacement or tobacco cessation medications    The patient did not set a quit date. I will continue to  follow up on this issue at our next scheduled visit.     I spent approximately 5 minutes on tobacco cessation counseling with this patient.

## 2025-02-26 NOTE — ASSESSMENT & PLAN NOTE
Related to left renal artery high grade stenosis.    First episode was in May 2024 when she had presented with accelerated hypertension.  Recent episode was also related to high BP.  She has been on nifedipine which was recently increased from 45 to 90.    No leg swelling or shortness of breath.  Overall BP is well managed at home.

## 2025-03-17 DIAGNOSIS — I70.1 RENAL ARTERY STENOSIS (HCC): ICD-10-CM

## 2025-03-18 ENCOUNTER — OFFICE VISIT (OUTPATIENT)
Dept: FAMILY MEDICINE CLINIC | Facility: CLINIC | Age: 75
End: 2025-03-18
Payer: MEDICARE

## 2025-03-18 VITALS
SYSTOLIC BLOOD PRESSURE: 112 MMHG | OXYGEN SATURATION: 97 % | HEART RATE: 62 BPM | BODY MASS INDEX: 28.63 KG/M2 | WEIGHT: 155.6 LBS | TEMPERATURE: 96.3 F | HEIGHT: 62 IN | DIASTOLIC BLOOD PRESSURE: 78 MMHG

## 2025-03-18 DIAGNOSIS — N18.32 STAGE 3B CHRONIC KIDNEY DISEASE (HCC): Primary | ICD-10-CM

## 2025-03-18 DIAGNOSIS — I10 PRIMARY HYPERTENSION: ICD-10-CM

## 2025-03-18 DIAGNOSIS — I50.32 CHRONIC DIASTOLIC CONGESTIVE HEART FAILURE (HCC): ICD-10-CM

## 2025-03-18 PROCEDURE — 99214 OFFICE O/P EST MOD 30 MIN: CPT | Performed by: FAMILY MEDICINE

## 2025-03-18 PROCEDURE — G2211 COMPLEX E/M VISIT ADD ON: HCPCS | Performed by: FAMILY MEDICINE

## 2025-03-18 RX ORDER — ATORVASTATIN CALCIUM 40 MG/1
TABLET, FILM COATED ORAL
Qty: 30 TABLET | Refills: 5 | Status: SHIPPED | OUTPATIENT
Start: 2025-03-18

## 2025-03-18 NOTE — PROGRESS NOTES
Name: Alyse Gray      : 1950      MRN: [id number]  Encounter Provider: Venkatesh Lundy MD  Encounter Date: 3/18/2025   Encounter department: [facility]    Assessment & Plan  Stage 3b chronic kidney disease (HCC)  Lab Results   Component Value Date    EGFR 45 2025    EGFR 36 2025    EGFR 44 06/10/2024    CREATININE 1.18 2025    CREATININE 1.42 (H) 2025    CREATININE 1.23 2024   Improved on recent labs.  Continue present care. Check labs       Primary hypertension  Well controlled. Cont present treatment. Monitor labs. Recheck 6m    Orders:  •  Comprehensive metabolic panel; Future  •  Lipid panel; Future    Chronic diastolic congestive heart failure (HCC)  Wt Readings from Last 3 Encounters:   25 70.6 kg (155 lb 9.6 oz)   25 71.7 kg (158 lb)   25 71 kg (156 lb 9.6 oz)   I reviewed with pt.  Weight improved.  Continue present care.  Recheck 6m                      History of Present Illness       History:  - pt doing better  - no further episodes of lightheadedness or vertigo since medswere adjusted.   - pt remains active.  Denies CP, palpitations, lightheadedness or other CV symptoms with or without exertion   - no new GI or  complaints.       Review of Systems   Constitutional:  Negative for activity change, appetite change, chills and fatigue.   HENT: Negative.     Eyes: Negative.    Respiratory: Negative.  Negative for apnea, cough and shortness of breath.    Cardiovascular: Negative.    Gastrointestinal: Negative.  Negative for diarrhea (resolved).   Genitourinary: Negative.    Musculoskeletal:  Positive for arthralgias (chronic), back pain (intermittent) and myalgias.   Skin: Negative.    Neurological:  Negative for dizziness (resolved), weakness, light-headedness (resolved), numbness and headaches.     Past Medical History:   Diagnosis Date   • Disease of thyroid gland    • Polymyalgia rheumatica (HCC)      Past  Surgical History:   Procedure Laterality Date   • CHOLECYSTECTOMY     • FOOT SURGERY     • GALLBLADDER SURGERY     • SHOULDER ARTHROSCOPY Right      Family History   Problem Relation Age of Onset   • Heart disease Mother    • Diabetes Mother    • Hypertension Mother    • Breast cancer Sister    • Colon cancer Brother    • Heart disease Brother    • Diabetes Brother    • Hypertension Brother      Social History     Tobacco Use   • Smoking status: Every Day     Current packs/day: 0.50     Average packs/day: 0.5 packs/day for 53.0 years (26.5 ttl pk-yrs)     Types: Cigarettes     Passive exposure: Current   • Smokeless tobacco: Never   Vaping Use   • Vaping status: Never Used   Substance and Sexual Activity   • Alcohol use: No   • Drug use: No   • Sexual activity: Not Currently     Current Outpatient Medications on File Prior to Visit   Medication Sig   • aspirin 81 mg chewable tablet Chew 1 tablet (81 mg total) daily   • Calcium Carb-Cholecalciferol (CALCIUM + VITAMIN D3 PO) Take by mouth 1200-1000iu   • levothyroxine 112 mcg tablet Take 1 tablet by mouth once daily   • NIFEdipine (PROCARDIA XL) 90 mg 24 hr tablet Take 1 tablet (90 mg total) by mouth daily   • atorvastatin (LIPITOR) 40 mg tablet TAKE 1 TABLET BY MOUTH ONCE DAILY WITH SUPPER     Allergies   Allergen Reactions   • Bee Venom    • Naproxen Hives   • Penicillins Hives   • Vicodin  [Hydrocodone-Acetaminophen] Hives and Itching   • Other Rash     Spider bite      Immunization History   Administered Date(s) Administered   • COVID-19 MODERNA VACC 0.5 ML IM 01/22/2021, 02/18/2021   • COVID-19 Moderna mRNA Vaccine 12 Yr+ 50 mcg/0.5 mL (Spikevax) 10/18/2023   • COVID-19 Novavax Seasonal vaccine IM 09/23/2024   • INFLUENZA 10/16/2017, 10/31/2018, 08/17/2020, 10/14/2021, 11/13/2022, 10/18/2023   • Influenza Quadrivalent, 6-35 Months IM 10/01/2014   • Influenza Split High Dose Preservative Free IM 09/23/2024   • Influenza, high dose seasonal 0.7 mL 08/17/2020   •  "Influenza, seasonal, injectable 01/28/2014   • Pneumococcal Conjugate 13-Valent 10/16/2017   • Pneumococcal Polysaccharide PPV23 10/01/2014, 10/31/2018     Objective   /78   Pulse 62   Temp (!) 96.3 °F (35.7 °C)   Ht 5' 2\" (1.575 m)   Wt 70.6 kg (155 lb 9.6 oz)   SpO2 97%   BMI 28.46 kg/m²     Physical Exam  Vitals reviewed.   HENT:      Head: Normocephalic.      Mouth/Throat:      Mouth: Mucous membranes are moist.      Pharynx: No oropharyngeal exudate or posterior oropharyngeal erythema.   Eyes:      Extraocular Movements: Extraocular movements intact.      Conjunctiva/sclera: Conjunctivae normal.      Pupils: Pupils are equal, round, and reactive to light.   Cardiovascular:      Rate and Rhythm: Normal rate and regular rhythm.   Pulmonary:      Effort: Pulmonary effort is normal.      Breath sounds: Normal breath sounds.   Abdominal:      General: There is no distension.      Palpations: There is no mass.      Tenderness: There is no abdominal tenderness.   Musculoskeletal:      Cervical back: Normal range of motion and neck supple.      Right lower leg: No edema.      Left lower leg: No edema.   Skin:     General: Skin is warm.      Capillary Refill: Capillary refill takes less than 2 seconds.   Neurological:      General: No focal deficit present.      Mental Status: She is alert and oriented to person, place, and time.         "

## 2025-03-18 NOTE — ASSESSMENT & PLAN NOTE
Well controlled. Cont present treatment. Monitor labs. Recheck 6m    Orders:  •  Comprehensive metabolic panel; Future  •  Lipid panel; Future

## 2025-03-18 NOTE — ASSESSMENT & PLAN NOTE
Lab Results   Component Value Date    EGFR 45 01/23/2025    EGFR 36 01/22/2025    EGFR 44 06/10/2024    CREATININE 1.18 01/23/2025    CREATININE 1.42 (H) 01/22/2025    CREATININE 1.23 09/19/2024   Improved on recent labs.  Continue present care. Check labs

## 2025-03-18 NOTE — PROGRESS NOTES
Name: Alyse Gray      : 1950      MRN: [id number]  Encounter Provider: Venkatesh Lundy MD  Encounter Date: 3/18/2025   Encounter department: [facility]    Assessment & Plan  Stage 3b chronic kidney disease (HCC)  Lab Results   Component Value Date    EGFR 45 2025    EGFR 36 2025    EGFR 44 06/10/2024    CREATININE 1.18 2025    CREATININE 1.42 (H) 2025    CREATININE 1.23 2024          Primary hypertension    Orders:  •  Comprehensive metabolic panel; Future  •  Lipid panel; Future    Medicare annual wellness visit, subsequent              Preventive health issues were discussed with patient, and age appropriate screening tests were ordered as noted in patient's After Visit Summary. Personalized health advice and appropriate referrals for health education or preventive services given if needed, as noted in patient's After Visit Summary.    History of Present Illness   {?Quick Links Encounters * My Last Note * Last Note in Specialty * Snapshot * Since Last Visit * History :19730}  HPI   Patient Care Team:  Venkatesh Lundy MD as PCP - General  Joselyn Reyes Bahamonde, MD (Nephrology)    Review of Systems  Medical History Reviewed by provider this encounter:       Annual Wellness Visit Questionnaire   Annual Wellness Visit  Social Drivers of Health     Financial Resource Strain: Low Risk  (2023)    Overall Financial Resource Strain (CARDIA)    • Difficulty of Paying Living Expenses: Not hard at all   Food Insecurity: No Food Insecurity (3/18/2025)    Hunger Vital Sign    • Worried About Running Out of Food in the Last Year: Never true    • Ran Out of Food in the Last Year: Never true   Transportation Needs: No Transportation Needs (3/18/2025)    PRAPARE - Transportation    • Lack of Transportation (Medical): No    • Lack of Transportation (Non-Medical): No   Housing Stability: Low Risk  (3/18/2025)    Housing Stability Vital Sign  "   • Unable to Pay for Housing in the Last Year: No    • Number of Times Moved in the Last Year: 0    • Homeless in the Last Year: No   Utilities: Not At Risk (3/18/2025)    [facility] Utilities    • Threatened with loss of utilities: No     No results found.    Objective {?Quick Links Trend Vitals * Enter New Vitals * Results Review * Timeline (Adult) * Labs * Imaging * Cardiology * Procedures * Lung Cancer Screening * Surgical eConsent :16003}  /78   Pulse 62   Temp (!) 96.3 °F (35.7 °C)   Ht 5' 2\" (1.575 m)   Wt 70.6 kg (155 lb 9.6 oz)   SpO2 97%   BMI 28.46 kg/m²     Physical Exam  {Administrative / Billing Section (Optional):60782}  "

## 2025-03-18 NOTE — ASSESSMENT & PLAN NOTE
Lab Results   Component Value Date    EGFR 45 01/23/2025    EGFR 36 01/22/2025    EGFR 44 06/10/2024    CREATININE 1.18 01/23/2025    CREATININE 1.42 (H) 01/22/2025    CREATININE 1.23 09/19/2024

## 2025-03-18 NOTE — PATIENT INSTRUCTIONS
Medicare Preventive Visit Patient Instructions  Thank you for completing your Welcome to Medicare Visit or Medicare Annual Wellness Visit today. Your next wellness visit will be due in one year (3/19/2026).  The screening/preventive services that you may require over the next 5-10 years are detailed below. Some tests may not apply to you based off risk factors and/or age. Screening tests ordered at today's visit but not completed yet may show as past due. Also, please note that scanned in results may not display below.  Preventive Screenings:  Service Recommendations Previous Testing/Comments   Colorectal Cancer Screening  * Colonoscopy    * Fecal Occult Blood Test (FOBT)/Fecal Immunochemical Test (FIT)  * Fecal DNA/Cologuard Test  * Flexible Sigmoidoscopy Age: 45-75 years old   Colonoscopy: every 10 years (may be performed more frequently if at higher risk)  OR  FOBT/FIT: every 1 year  OR  Cologuard: every 3 years  OR  Sigmoidoscopy: every 5 years  Screening may be recommended earlier than age 45 if at higher risk for colorectal cancer. Also, an individualized decision between you and your healthcare provider will decide whether screening between the ages of 76-85 would be appropriate. Colonoscopy: 12/16/2020  FOBT/FIT: Not on file  Cologuard: Not on file  Sigmoidoscopy: Not on file          Breast Cancer Screening Age: 40+ years old  Frequency: every 1-2 years  Not required if history of left and right mastectomy Mammogram: 06/19/2018        Cervical Cancer Screening Between the ages of 21-29, pap smear recommended once every 3 years.   Between the ages of 30-65, can perform pap smear with HPV co-testing every 5 years.   Recommendations may differ for women with a history of total hysterectomy, cervical cancer, or abnormal pap smears in past. Pap Smear: 06/13/2018        Hepatitis C Screening Once for adults born between 1945 and 1965  More frequently in patients at high risk for Hepatitis C Hep C Antibody:  06/22/2018        Diabetes Screening 1-2 times per year if you're at risk for diabetes or have pre-diabetes Fasting glucose: 86 mg/dL (9/19/2024)  A1C: No results in last 5 years (No results in last 5 years)      Cholesterol Screening Once every 5 years if you don't have a lipid disorder. May order more often based on risk factors. Lipid panel: 09/19/2024          Other Preventive Screenings Covered by Medicare:  Abdominal Aortic Aneurysm (AAA) Screening: covered once if your at risk. You're considered to be at risk if you have a family history of AAA.  Lung Cancer Screening: covers low dose CT scan once per year if you meet all of the following conditions: (1) Age 55-77; (2) No signs or symptoms of lung cancer; (3) Current smoker or have quit smoking within the last 15 years; (4) You have a tobacco smoking history of at least 20 pack years (packs per day multiplied by number of years you smoked); (5) You get a written order from a healthcare provider.  Glaucoma Screening: covered annually if you're considered high risk: (1) You have diabetes OR (2) Family history of glaucoma OR (3)  aged 50 and older OR (4)  American aged 65 and older  Osteoporosis Screening: covered every 2 years if you meet one of the following conditions: (1) You're estrogen deficient and at risk for osteoporosis based off medical history and other findings; (2) Have a vertebral abnormality; (3) On glucocorticoid therapy for more than 3 months; (4) Have primary hyperparathyroidism; (5) On osteoporosis medications and need to assess response to drug therapy.   Last bone density test (DXA Scan): 05/12/2020.  HIV Screening: covered annually if you're between the age of 15-65. Also covered annually if you are younger than 15 and older than 65 with risk factors for HIV infection. For pregnant patients, it is covered up to 3 times per pregnancy.    Immunizations:  Immunization Recommendations   Influenza Vaccine Annual  influenza vaccination during flu season is recommended for all persons aged >= 6 months who do not have contraindications   Pneumococcal Vaccine   * Pneumococcal conjugate vaccine = PCV13 (Prevnar 13), PCV15 (Vaxneuvance), PCV20 (Prevnar 20)  * Pneumococcal polysaccharide vaccine = PPSV23 (Pneumovax) Adults 19-65 yo with certain risk factors or if 65+ yo  If never received any pneumonia vaccine: recommend Prevnar 20 (PCV20)  Give PCV20 if previously received 1 dose of PCV13 or PPSV23   Hepatitis B Vaccine 3 dose series if at intermediate or high risk (ex: diabetes, end stage renal disease, liver disease)   Respiratory syncytial virus (RSV) Vaccine - COVERED BY MEDICARE PART D  * RSVPreF3 (Arexvy) CDC recommends that adults 60 years of age and older may receive a single dose of RSV vaccine using shared clinical decision-making (SCDM)   Tetanus (Td) Vaccine - COST NOT COVERED BY MEDICARE PART B Following completion of primary series, a booster dose should be given every 10 years to maintain immunity against tetanus. Td may also be given as tetanus wound prophylaxis.   Tdap Vaccine - COST NOT COVERED BY MEDICARE PART B Recommended at least once for all adults. For pregnant patients, recommended with each pregnancy.   Shingles Vaccine (Shingrix) - COST NOT COVERED BY MEDICARE PART B  2 shot series recommended in those 19 years and older who have or will have weakened immune systems or those 50 years and older     Health Maintenance Due:      Topic Date Due   • Breast Cancer Screening: Mammogram  06/19/2019   • Lung Cancer Screening  05/12/2021   • DXA SCAN  05/12/2023   • Colorectal Cancer Screening  12/16/2025   • Hepatitis C Screening  Completed     Immunizations Due:      Topic Date Due   • Hepatitis A Vaccine (1 of 2 - Risk 2-dose series) Never done     Advance Directives   What are advance directives?  Advance directives are legal documents that state your wishes and plans for medical care. These plans are made  ahead of time in case you lose your ability to make decisions for yourself. Advance directives can apply to any medical decision, such as the treatments you want, and if you want to donate organs.   What are the types of advance directives?  There are many types of advance directives, and each state has rules about how to use them. You may choose a combination of any of the following:  Living will:  This is a written record of the treatment you want. You can also choose which treatments you do not want, which to limit, and which to stop at a certain time. This includes surgery, medicine, IV fluid, and tube feedings.   Durable power of  for healthcare (DPAHC):  This is a written record that states who you want to make healthcare choices for you when you are unable to make them for yourself. This person, called a proxy, is usually a family member or a friend. You may choose more than 1 proxy.  Do not resuscitate (DNR) order:  A DNR order is used in case your heart stops beating or you stop breathing. It is a request not to have certain forms of treatment, such as CPR. A DNR order may be included in other types of advance directives.  Medical directive:  This covers the care that you want if you are in a coma, near death, or unable to make decisions for yourself. You can list the treatments you want for each condition. Treatment may include pain medicine, surgery, blood transfusions, dialysis, IV or tube feedings, and a ventilator (breathing machine).  Values history:  This document has questions about your views, beliefs, and how you feel and think about life. This information can help others choose the care that you would choose.  Why are advance directives important?  An advance directive helps you control your care. Although spoken wishes may be used, it is better to have your wishes written down. Spoken wishes can be misunderstood, or not followed. Treatments may be given even if you do not want them. An  advance directive may make it easier for your family to make difficult choices about your care.   Urinary Incontinence   Urinary incontinence (UI)  is when you lose control of your bladder. UI develops because your bladder cannot store or empty urine properly. The 3 most common types of UI are stress incontinence, urge incontinence, or both.  Medicines:   May be given to help strengthen your bladder control. Report any side effects of medication to your healthcare provider.  Do pelvic muscle exercises often:  Your pelvic muscles help you stop urinating. Squeeze these muscles tight for 5 seconds, then relax for 5 seconds. Gradually work up to squeezing for 10 seconds. Do 3 sets of 15 repetitions a day, or as directed. This will help strengthen your pelvic muscles and improve bladder control.  Train your bladder:  Go to the bathroom at set times, such as every 2 hours, even if you do not feel the urge to go. You can also try to hold your urine when you feel the urge to go. For example, hold your urine for 5 minutes when you feel the urge to go. As that becomes easier, hold your urine for 10 minutes.   Self-care:   Keep a UI record.  Write down how often you leak urine and how much you leak. Make a note of what you were doing when you leaked urine.  Drink liquids as directed. You may need to limit the amount of liquid you drink to help control your urine leakage. Do not drink any liquid right before you go to bed. Limit or do not have drinks that contain caffeine or alcohol.   Prevent constipation.  Eat a variety of high-fiber foods. Good examples are high-fiber cereals, beans, vegetables, and whole-grain breads. Walking is the best way to trigger your intestines to have a bowel movement.  Exercise regularly and maintain a healthy weight.  Weight loss and exercise will decrease pressure on your bladder and help you control your leakage.   Use a catheter as directed  to help empty your bladder. A catheter is a tiny,  plastic tube that is put into your bladder to drain your urine.   Go to behavior therapy as directed.  Behavior therapy may be used to help you learn to control your urge to urinate.    Cigarette Smoking and Your Health   Risks to your health if you smoke:  Nicotine and other chemicals found in tobacco damage every cell in your body. Even if you are a light smoker, you have an increased risk for cancer, heart disease, and lung disease. If you are pregnant or have diabetes, smoking increases your risk for complications.   Benefits to your health if you stop smoking:   You decrease respiratory symptoms such as coughing, wheezing, and shortness of breath.   You reduce your risk for cancers of the lung, mouth, throat, kidney, bladder, pancreas, stomach, and cervix. If you already have cancer, you increase the benefits of chemotherapy. You also reduce your risk for cancer returning or a second cancer from developing.   You reduce your risk for heart disease, blood clots, heart attack, and stroke.   You reduce your risk for lung infections, and diseases such as pneumonia, asthma, chronic bronchitis, and emphysema.  Your circulation improves. More oxygen can be delivered to your body. If you have diabetes, you lower your risk for complications, such as kidney, artery, and eye diseases. You also lower your risk for nerve damage. Nerve damage can lead to amputations, poor vision, and blindness.  You improve your body's ability to heal and to fight infections.  For more information and support to stop smoking:   [employer]  Phone: 3- 589 - 991-2255  Web Address: www.[employer]  Weight Management   Why it is important to manage your weight:  Being overweight increases your risk of health conditions such as heart disease, high blood pressure, type 2 diabetes, and certain types of cancer. It can also increase your risk for osteoarthritis, sleep apnea, and other respiratory problems. Aim for a slow, steady weight loss. Even  a small amount of weight loss can lower your risk of health problems.  How to lose weight safely:  A safe and healthy way to lose weight is to eat fewer calories and get regular exercise. You can lose up about 1 pound a week by decreasing the number of calories you eat by 500 calories each day.   Healthy meal plan for weight management:  A healthy meal plan includes a variety of foods, contains fewer calories, and helps you stay healthy. A healthy meal plan includes the following:  Eat whole-grain foods more often.  A healthy meal plan should contain fiber. Fiber is the part of grains, fruits, and vegetables that is not broken down by your body. Whole-grain foods are healthy and provide extra fiber in your diet. Some examples of whole-grain foods are whole-wheat breads and pastas, oatmeal, brown rice, and bulgur.  Eat a variety of vegetables every day.  Include dark, leafy greens such as spinach, kale, lima greens, and mustard greens. Eat yellow and orange vegetables such as carrots, sweet potatoes, and winter squash.   Eat a variety of fruits every day.  Choose fresh or canned fruit (canned in its own juice or light syrup) instead of juice. Fruit juice has very little or no fiber.  Eat low-fat dairy foods.  Drink fat-free (skim) milk or 1% milk. Eat fat-free yogurt and low-fat cottage cheese. Try low-fat cheeses such as mozzarella and other reduced-fat cheeses.  Choose meat and other protein foods that are low in fat.  Choose beans or other legumes such as split peas or lentils. Choose fish, skinless poultry (chicken or turkey), or lean cuts of red meat (beef or pork). Before you cook meat or poultry, cut off any visible fat.   Use less fat and oil.  Try baking foods instead of frying them. Add less fat, such as margarine, sour cream, regular salad dressing and mayonnaise to foods. Eat fewer high-fat foods. Some examples of high-fat foods include french fries, doughnuts, ice cream, and cakes.  Eat fewer sweets.   Limit foods and drinks that are high in sugar. This includes candy, cookies, regular soda, and sweetened drinks.  Exercise:  Exercise at least 30 minutes per day on most days of the week. Some examples of exercise include walking, biking, dancing, and swimming. You can also fit in more physical activity by taking the stairs instead of the elevator or parking farther away from stores. Ask your healthcare provider about the best exercise plan for you.      © Copyright [employer] 2018 Information is for End User's use only and may not be sold, redistributed or otherwise used for commercial purposes. All illustrations and images included in CareNotes® are the copyrighted property of [employer] Inc[employer] or [employer]

## 2025-03-20 NOTE — ASSESSMENT & PLAN NOTE
Wt Readings from Last 3 Encounters:   03/18/25 70.6 kg (155 lb 9.6 oz)   02/13/25 71.7 kg (158 lb)   01/27/25 71 kg (156 lb 9.6 oz)   I reviewed with pt.  Weight improved.  Continue present care.  Recheck 6m

## 2025-03-30 DIAGNOSIS — E03.9 HYPOTHYROIDISM, UNSPECIFIED TYPE: ICD-10-CM

## 2025-03-30 RX ORDER — LEVOTHYROXINE SODIUM 112 UG/1
112 TABLET ORAL DAILY
Qty: 90 TABLET | Refills: 1 | Status: SHIPPED | OUTPATIENT
Start: 2025-03-30

## 2025-05-12 ENCOUNTER — APPOINTMENT (OUTPATIENT)
Dept: LAB | Facility: CLINIC | Age: 75
End: 2025-05-12
Payer: MEDICARE

## 2025-05-12 DIAGNOSIS — N18.32 STAGE 3B CHRONIC KIDNEY DISEASE (HCC): ICD-10-CM

## 2025-05-12 DIAGNOSIS — I10 PRIMARY HYPERTENSION: ICD-10-CM

## 2025-05-12 LAB
ALBUMIN SERPL BCG-MCNC: 4.6 G/DL (ref 3.5–5)
ALP SERPL-CCNC: 78 U/L (ref 34–104)
ALT SERPL W P-5'-P-CCNC: 29 U/L (ref 7–52)
ANION GAP SERPL CALCULATED.3IONS-SCNC: 9 MMOL/L (ref 4–13)
AST SERPL W P-5'-P-CCNC: 26 U/L (ref 13–39)
BILIRUB SERPL-MCNC: 0.5 MG/DL (ref 0.2–1)
BUN SERPL-MCNC: 16 MG/DL (ref 5–25)
CALCIUM SERPL-MCNC: 9.6 MG/DL (ref 8.4–10.2)
CHLORIDE SERPL-SCNC: 103 MMOL/L (ref 96–108)
CHOLEST SERPL-MCNC: 105 MG/DL (ref ?–200)
CO2 SERPL-SCNC: 26 MMOL/L (ref 21–32)
CREAT SERPL-MCNC: 1.14 MG/DL (ref 0.6–1.3)
CREAT UR-MCNC: 62.9 MG/DL
GFR SERPL CREATININE-BSD FRML MDRD: 47 ML/MIN/1.73SQ M
GLUCOSE P FAST SERPL-MCNC: 97 MG/DL (ref 65–99)
HDLC SERPL-MCNC: 46 MG/DL
LDLC SERPL CALC-MCNC: 43 MG/DL (ref 0–100)
MICROALBUMIN UR-MCNC: 34.9 MG/L
MICROALBUMIN/CREAT 24H UR: 55 MG/G CREATININE (ref 0–30)
NONHDLC SERPL-MCNC: 59 MG/DL
POTASSIUM SERPL-SCNC: 4.9 MMOL/L (ref 3.5–5.3)
PROT SERPL-MCNC: 8.1 G/DL (ref 6.4–8.4)
PTH-INTACT SERPL-MCNC: 85 PG/ML (ref 12–88)
SODIUM SERPL-SCNC: 138 MMOL/L (ref 135–147)
TRIGL SERPL-MCNC: 82 MG/DL (ref ?–150)

## 2025-05-12 PROCEDURE — 82570 ASSAY OF URINE CREATININE: CPT

## 2025-05-12 PROCEDURE — 83970 ASSAY OF PARATHORMONE: CPT

## 2025-05-12 PROCEDURE — 82043 UR ALBUMIN QUANTITATIVE: CPT

## 2025-05-12 PROCEDURE — 36415 COLL VENOUS BLD VENIPUNCTURE: CPT

## 2025-05-12 PROCEDURE — 81001 URINALYSIS AUTO W/SCOPE: CPT

## 2025-05-12 PROCEDURE — 80061 LIPID PANEL: CPT

## 2025-05-12 PROCEDURE — 80053 COMPREHEN METABOLIC PANEL: CPT

## 2025-05-13 ENCOUNTER — RESULTS FOLLOW-UP (OUTPATIENT)
Dept: FAMILY MEDICINE CLINIC | Facility: CLINIC | Age: 75
End: 2025-05-13

## 2025-05-13 LAB
BACTERIA UR QL AUTO: ABNORMAL /HPF
BILIRUB UR QL STRIP: NEGATIVE
CLARITY UR: CLEAR
COLOR UR: ABNORMAL
GLUCOSE UR STRIP-MCNC: NEGATIVE MG/DL
HGB UR QL STRIP.AUTO: NEGATIVE
KETONES UR STRIP-MCNC: NEGATIVE MG/DL
LEUKOCYTE ESTERASE UR QL STRIP: NEGATIVE
NITRITE UR QL STRIP: NEGATIVE
NON-SQ EPI CELLS URNS QL MICRO: ABNORMAL /HPF
PH UR STRIP.AUTO: 6 [PH]
PROT UR STRIP-MCNC: ABNORMAL MG/DL
RBC #/AREA URNS AUTO: ABNORMAL /HPF
SP GR UR STRIP.AUTO: 1.01 (ref 1–1.03)
UROBILINOGEN UR STRIP-ACNC: <2 MG/DL
WBC #/AREA URNS AUTO: ABNORMAL /HPF

## 2025-05-29 ENCOUNTER — OFFICE VISIT (OUTPATIENT)
Dept: NEPHROLOGY | Facility: CLINIC | Age: 75
End: 2025-05-29
Payer: MEDICARE

## 2025-05-29 VITALS
WEIGHT: 160 LBS | DIASTOLIC BLOOD PRESSURE: 76 MMHG | HEART RATE: 76 BPM | OXYGEN SATURATION: 100 % | HEIGHT: 62 IN | SYSTOLIC BLOOD PRESSURE: 152 MMHG | BODY MASS INDEX: 29.44 KG/M2

## 2025-05-29 DIAGNOSIS — I15.0 RENOVASCULAR HYPERTENSION: ICD-10-CM

## 2025-05-29 DIAGNOSIS — M89.9 CHRONIC KIDNEY DISEASE-MINERAL BONE DISORDER (CKD-MBD) WITH STAGE 3B CHRONIC KIDNEY DISEASE (HCC): ICD-10-CM

## 2025-05-29 DIAGNOSIS — N18.32 CHRONIC KIDNEY DISEASE-MINERAL BONE DISORDER (CKD-MBD) WITH STAGE 3B CHRONIC KIDNEY DISEASE (HCC): ICD-10-CM

## 2025-05-29 DIAGNOSIS — E83.9 CHRONIC KIDNEY DISEASE-MINERAL BONE DISORDER (CKD-MBD) WITH STAGE 3B CHRONIC KIDNEY DISEASE (HCC): ICD-10-CM

## 2025-05-29 DIAGNOSIS — N18.32 STAGE 3B CHRONIC KIDNEY DISEASE (HCC): Primary | ICD-10-CM

## 2025-05-29 DIAGNOSIS — I70.1 RAS (RENAL ARTERY STENOSIS) (HCC): ICD-10-CM

## 2025-05-29 PROCEDURE — G2211 COMPLEX E/M VISIT ADD ON: HCPCS | Performed by: STUDENT IN AN ORGANIZED HEALTH CARE EDUCATION/TRAINING PROGRAM

## 2025-05-29 PROCEDURE — 99214 OFFICE O/P EST MOD 30 MIN: CPT | Performed by: STUDENT IN AN ORGANIZED HEALTH CARE EDUCATION/TRAINING PROGRAM

## 2025-05-29 NOTE — PATIENT INSTRUCTIONS
Thank you for coming to your visit today. As we discussed you kidney function is stable at your baseline, your creatinine is 1.1 mg/dL.  Your electrolytes are normal. Please follow the recommendations below       Recommend low sodium (salt) food    Avoid nonsteroidal anti-inflammatory drugs such as Naprosyn, ibuprofen, Aleve, Advil, Celebrex, Meloxicam (Mobic) etc.  You can use Tylenol as needed if you do not have any liver condition to be concerned about    Try to exercise at least 30 minutes 3 days a week to begin with with an ultimate goal of 5 days a week for at least 30 minutes    Next Visit in 12 months with results   If you need to see us earlier we can change the appointment for you      Joselyn Reyes Bahamonde, MD  Nephrology Attending

## 2025-05-29 NOTE — ASSESSMENT & PLAN NOTE
Volume: Euvolemic  Blood pressure: Borderline hypertensive, /65.  Home /76  Secondary to left GERMÁN  Low sodium diet   Continue with low-sodium diet  Continue nifedipine 90

## 2025-05-29 NOTE — PROGRESS NOTES
Name: Alyse Gray      : 1950      MRN: [id number]  Encounter Provider: Joselyn Reyes Bahamonde, MD  Encounter Date: 2025   Encounter department: [facility]  :  Assessment & Plan  Renovascular hypertension  Volume: Euvolemic  Blood pressure: Borderline hypertensive, /65.  Home /76  Secondary to left GERMÁN  Low sodium diet   Continue with low-sodium diet  Continue nifedipine 90       GERMÁN (renal artery stenosis) (HCC)  Left renal artery stenosis on recent CT with contrast  secondary atrophic kidney and CKD         Stage 3b chronic kidney disease (HCC)  Lab Results   Component Value Date    EGFR 47 2025    EGFR 45 2025    EGFR 36 2025    CREATININE 1.14 2025    CREATININE 1.18 2025    CREATININE 1.42 (H) 2025   Baseline creatinine: 1.3 to 1.5 mg/dL  Current creatinine: 1.1 mg milligrams per deciliter, below baseline  Etiology: Likely secondary to renovascular disease in the settings of renal artery stenosis and atrophic kidney  No hematuria, no leukocyturia  Imaging: Left atrophic kidney, no hydronephrosis  Plan:  Kidney function stable, no indication for dialysis at this time   Avoid NSAIDs we   will monitor kidney function closely with BMP, UACR        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)  Lab Results   Component Value Date    EGFR 47 2025    EGFR 45 2025    EGFR 36 2025    CREATININE 1.14 2025    CREATININE 1.18 2025    CREATININE 1.42 (H) 2025   Calcium 9.6 mg/dL  No indication of binders at this time             History of Present Illness   HPI  Alyse Gray is a 75 y.o. female hypertension, history of lung nodules, PMR, left atrophic kidney, left GERMÁN who presents follow up of hypertension   History obtained from: patient    Review of Systems   Constitutional:  Negative for activity change, appetite change and chills.    HENT:  Negative for congestion and dental problem.    Eyes:  Negative for discharge.   Respiratory:  Negative for shortness of breath.    Cardiovascular:  Negative for chest pain and leg swelling.   Gastrointestinal:  Negative for abdominal distention and abdominal pain.   Endocrine: Negative for cold intolerance.   Genitourinary:  Negative for dysuria.   Musculoskeletal:  Negative for arthralgias.   Skin:  Negative for color change and pallor.   Neurological:  Negative for dizziness.   Psychiatric/Behavioral:  Negative for agitation.      Pertinent Medical History         Medical History Reviewed by provider this encounter:     .  Medications Ordered Prior to Encounter[1]   Social History[2]     Objective   There were no vitals taken for this visit.     Physical Exam  General:  no acute distress at this time  Skin:  No acute rash  Eyes:  No scleral icterus and noninjected  ENT:  mucous membranes moist  Neck:  no carotid bruits  Chest:  Clear to auscultation percussion, good respiratory effort, no use of accessory respiratory muscles  CVS:  Regular rate and rhythm without rub   Abdomen:  soft and nontender   Extremities: no significant lower extremity edema  Neuro:  No gross focality  Psych:  Alert , cooperative                [1]   Current Outpatient Medications on File Prior to Visit   Medication Sig Dispense Refill    aspirin 81 mg chewable tablet Chew 1 tablet (81 mg total) daily 30 tablet 0    atorvastatin (LIPITOR) 40 mg tablet TAKE 1 TABLET BY MOUTH ONCE DAILY WITH SUPPER 30 tablet 5    Calcium Carb-Cholecalciferol (CALCIUM + VITAMIN D3 PO) Take by mouth 1200-1000iu      levothyroxine 112 mcg tablet Take 1 tablet by mouth once daily 90 tablet 1    NIFEdipine (PROCARDIA XL) 90 mg 24 hr tablet Take 1 tablet (90 mg total) by mouth daily 90 tablet 1     No current facility-administered medications on file prior to visit.   [2]   Social History  Tobacco Use    Smoking status: Every Day     Current packs/day:  0.50     Average packs/day: 0.5 packs/day for 53.0 years (26.5 ttl pk-yrs)     Types: Cigarettes     Passive exposure: Current    Smokeless tobacco: Never   Vaping Use    Vaping status: Never Used   Substance and Sexual Activity    Alcohol use: No    Drug use: No    Sexual activity: Not Currently

## 2025-05-29 NOTE — ASSESSMENT & PLAN NOTE
Lab Results   Component Value Date    EGFR 47 05/12/2025    EGFR 45 01/23/2025    EGFR 36 01/22/2025    CREATININE 1.14 05/12/2025    CREATININE 1.18 01/23/2025    CREATININE 1.42 (H) 01/22/2025   Calcium 9.6 mg/dL  No indication of binders at this time

## 2025-05-29 NOTE — ASSESSMENT & PLAN NOTE
Lab Results   Component Value Date    EGFR 47 05/12/2025    EGFR 45 01/23/2025    EGFR 36 01/22/2025    CREATININE 1.14 05/12/2025    CREATININE 1.18 01/23/2025    CREATININE 1.42 (H) 01/22/2025   Baseline creatinine: 1.3 to 1.5 mg/dL  Current creatinine: 1.1 mg milligrams per deciliter, below baseline  Etiology: Likely secondary to renovascular disease in the settings of renal artery stenosis and atrophic kidney  No hematuria, no leukocyturia  Imaging: Left atrophic kidney, no hydronephrosis  Plan:  Kidney function stable, no indication for dialysis at this time   Avoid NSAIDs we   will monitor kidney function closely with BMP, UACR         Discharge instructions given by /ELVA instructions